# Patient Record
Sex: FEMALE | Race: BLACK OR AFRICAN AMERICAN | Employment: UNEMPLOYED | URBAN - METROPOLITAN AREA
[De-identification: names, ages, dates, MRNs, and addresses within clinical notes are randomized per-mention and may not be internally consistent; named-entity substitution may affect disease eponyms.]

---

## 2017-10-10 ENCOUNTER — HOSPITAL ENCOUNTER (EMERGENCY)
Age: 47
Discharge: HOME | End: 2017-10-10
Attending: EMERGENCY MEDICINE
Payer: MEDICARE

## 2017-10-10 ENCOUNTER — APPOINTMENT (OUTPATIENT)
Dept: GENERAL RADIOLOGY | Age: 47
End: 2017-10-10
Payer: MEDICARE

## 2017-10-10 VITALS
RESPIRATION RATE: 17 BRPM | OXYGEN SATURATION: 97 % | SYSTOLIC BLOOD PRESSURE: 163 MMHG | DIASTOLIC BLOOD PRESSURE: 84 MMHG | HEART RATE: 93 BPM | TEMPERATURE: 97.9 F

## 2017-10-10 DIAGNOSIS — R41.82 ALTERED MENTAL STATUS, UNSPECIFIED: Primary | ICD-10-CM

## 2017-10-10 LAB
ABSOLUTE EOS #: 0.5 K/UL (ref 0–0.4)
ABSOLUTE LYMPH #: 1.89 K/UL (ref 1–4.8)
ABSOLUTE MONO #: 0.38 K/UL (ref 0.1–0.8)
ACETAMINOPHEN LEVEL: <10 UG/ML (ref 10–30)
ANION GAP SERPL CALCULATED.3IONS-SCNC: 16 MMOL/L (ref 9–17)
BASOPHILS # BLD: 1 %
BASOPHILS ABSOLUTE: 0.06 K/UL (ref 0–0.2)
BUN BLDV-MCNC: 13 MG/DL (ref 6–20)
BUN/CREAT BLD: ABNORMAL (ref 9–20)
CALCIUM SERPL-MCNC: 8.8 MG/DL (ref 8.6–10.4)
CHLORIDE BLD-SCNC: 99 MMOL/L (ref 98–107)
CO2: 19 MMOL/L (ref 20–31)
CREAT SERPL-MCNC: 0.88 MG/DL (ref 0.5–0.9)
DIFFERENTIAL TYPE: ABNORMAL
EOSINOPHILS RELATIVE PERCENT: 8 %
ETHANOL PERCENT: 0.11 %
ETHANOL: 107 MG/DL
GFR AFRICAN AMERICAN: >60 ML/MIN
GFR NON-AFRICAN AMERICAN: >60 ML/MIN
GFR SERPL CREATININE-BSD FRML MDRD: ABNORMAL ML/MIN/{1.73_M2}
GFR SERPL CREATININE-BSD FRML MDRD: ABNORMAL ML/MIN/{1.73_M2}
GLUCOSE BLD-MCNC: 361 MG/DL (ref 70–99)
HCT VFR BLD CALC: 34.1 % (ref 36–46)
HEMOGLOBIN: 11.3 G/DL (ref 12–16)
LYMPHOCYTES # BLD: 30 %
MCH RBC QN AUTO: 30.8 PG (ref 26–34)
MCHC RBC AUTO-ENTMCNC: 33.3 G/DL (ref 31–37)
MCV RBC AUTO: 92.5 FL (ref 80–100)
MONOCYTES # BLD: 6 %
MORPHOLOGY: NORMAL
PDW BLD-RTO: 12.9 % (ref 12.5–15.4)
PLATELET # BLD: ABNORMAL K/UL (ref 140–450)
PLATELET ESTIMATE: ABNORMAL
PMV BLD AUTO: ABNORMAL FL (ref 6–12)
POTASSIUM SERPL-SCNC: 4.5 MMOL/L (ref 3.7–5.3)
RBC # BLD: 3.68 M/UL (ref 4–5.2)
RBC # BLD: ABNORMAL 10*6/UL
SALICYLATE LEVEL: <1 MG/DL (ref 3–10)
SEG NEUTROPHILS: 55 %
SEGMENTED NEUTROPHILS ABSOLUTE COUNT: 3.47 K/UL (ref 1.8–7.7)
SODIUM BLD-SCNC: 134 MMOL/L (ref 135–144)
TOXIC TRICYCLIC SC,BLOOD: NEGATIVE
WBC # BLD: 6.3 K/UL (ref 3.5–11)
WBC # BLD: ABNORMAL 10*3/UL

## 2017-10-10 PROCEDURE — 71020 XR CHEST STANDARD TWO VW: CPT

## 2017-10-10 PROCEDURE — 99285 EMERGENCY DEPT VISIT HI MDM: CPT

## 2017-10-10 PROCEDURE — 2500000003 HC RX 250 WO HCPCS: Performed by: STUDENT IN AN ORGANIZED HEALTH CARE EDUCATION/TRAINING PROGRAM

## 2017-10-10 PROCEDURE — 80048 BASIC METABOLIC PNL TOTAL CA: CPT

## 2017-10-10 PROCEDURE — 93005 ELECTROCARDIOGRAM TRACING: CPT

## 2017-10-10 PROCEDURE — 85025 COMPLETE CBC W/AUTO DIFF WBC: CPT

## 2017-10-10 PROCEDURE — 80307 DRUG TEST PRSMV CHEM ANLYZR: CPT

## 2017-10-10 PROCEDURE — 96374 THER/PROPH/DIAG INJ IV PUSH: CPT

## 2017-10-10 PROCEDURE — G0480 DRUG TEST DEF 1-7 CLASSES: HCPCS

## 2017-10-10 RX ORDER — LABETALOL HYDROCHLORIDE 5 MG/ML
5 INJECTION, SOLUTION INTRAVENOUS ONCE
Status: COMPLETED | OUTPATIENT
Start: 2017-10-10 | End: 2017-10-10

## 2017-10-10 RX ADMIN — LABETALOL HYDROCHLORIDE 5 MG: 5 INJECTION INTRAVENOUS at 20:15

## 2017-10-10 ASSESSMENT — ENCOUNTER SYMPTOMS
VOMITING: 0
SHORTNESS OF BREATH: 0
ABDOMINAL PAIN: 0
NAUSEA: 0

## 2017-10-11 LAB
EKG ATRIAL RATE: 89 BPM
EKG P AXIS: 30 DEGREES
EKG P-R INTERVAL: 150 MS
EKG Q-T INTERVAL: 352 MS
EKG QRS DURATION: 68 MS
EKG QTC CALCULATION (BAZETT): 428 MS
EKG R AXIS: 32 DEGREES
EKG T AXIS: 38 DEGREES
EKG VENTRICULAR RATE: 89 BPM

## 2017-10-11 NOTE — ED PROVIDER NOTES
physician    Rhythm: normal sinus  Rate: 89, normal  Axis: normal  Ectopy: none  Conduction: normal  ST Segments: normal, no acute infarction evident  T Waves: normal  Q Waves: none    EKG Interpretation:  Normal EKG    All EKG's are interpreted by the Emergency Department Physician who either signs or Co-signs this chart in the absence of a cardiologist.      CRITICAL CARE TIME:    None    Janna Mendoza MD, [facility]  Attending Emergency  Physician    (Please note that portions of this note were completed with a voice recognition program.  Efforts were made to edit the dictations but occasionally words are mis-transcribed.)       Janna Mendoza MD  10/10/17 2030

## 2017-10-11 NOTE — ED PROVIDER NOTES
[address]  ED  Emergency Department Encounter  Emergency Medicine Resident     Pt Name: Sneha Mckeon  MRN: [id number]  [place] 1970  Date of evaluation: 10/10/17  PCP:  No primary care provider on file. CHIEF COMPLAINT       Chief Complaint   Patient presents with    Altered Mental Status     Pt to ED per EMS from assisted, pt was arrested by [place] police and on the way to assisted pt became unresponsive       HISTORY OF PRESENT ILLNESS  (Location/Symptom, Timing/Onset, Context/Setting, Quality, Duration, Modifying Factors, Severity.)      Sneha Mckeon is a 40 y.o. female who presents with Episodes of syncope and altered mental status. Patient was being escorted to assisted when she became unresponsive. EMS reported that her breathing was unlabored, with a normal pulse oximetry. Patient arrived unresponsive, however after ammonia stick was placed on a patient so she came to. Patient is unaware of events leading up to her arrest were presentation to the emergency department. Patient states that all she remembers is regular house waiting for a gas company to come and next she remembers she is waking up in this department. Patient denies drug use. Patient has history of high blood pressure, states she has not taken her medications in over a day. Patient denies headaches, shortness of breath, chest pain, abdominal pain, nausea or vomiting. PAST MEDICAL / SURGICAL / SOCIAL / FAMILY HISTORY      has a past medical history of Diabetes mellitus ([address] Diabetic neuropathy ([address] and Hypertension. has no past surgical history on file. Social History     Social History    Marital status: Single     Spouse name: N/A    Number of children: N/A    Years of education: N/A     Occupational History    Not on file.      Social History Main Topics    Smoking status: Current Every Day Smoker     Packs/day: 1.00    Smokeless tobacco: Not on file    Alcohol use 3.6 oz/week     6 Cans of beer per week    Drug use: No    Sexual activity: Yes     Partners: Male     Other Topics Concern    Not on file     Social History Narrative    No narrative on file       No family history on file. Allergies:  Review of patient's allergies indicates no known allergies. Home Medications:  Prior to Admission medications    Not on File       REVIEW OF SYSTEMS    (2-9 systems for level 4, 10 or more for level 5)      Review of Systems   Constitutional: Negative for chills and fever. Respiratory: Negative for shortness of breath. Cardiovascular: Negative for chest pain. Gastrointestinal: Negative for abdominal pain, nausea and vomiting. Musculoskeletal: Negative. Neurological: Positive for syncope. Negative for facial asymmetry, speech difficulty, weakness and headaches. Psychiatric/Behavioral: Positive for confusion. PHYSICAL EXAM   (up to 7 for level 4, 8 or more for level 5)      INITIAL VITALS:   BP (!) 163/84   Pulse 93   Temp 97.9 °F (36.6 °C)   Resp 17   SpO2 97%     Physical Exam   Constitutional: No distress. HENT:   Head: Normocephalic and atraumatic. Eyes: EOM are normal. Pupils are equal, round, and reactive to light. Neck: Normal range of motion. No tracheal deviation present. Cardiovascular: Normal rate and regular rhythm. No murmur heard. Pulmonary/Chest: Effort normal. No respiratory distress. She has no wheezes. Abdominal: Soft. There is no tenderness. Neurological: She is alert. Skin: Skin is warm and dry.        DIFFERENTIAL  DIAGNOSIS     PLAN (LABS / IMAGING / EKG):  Orders Placed This Encounter   Procedures    XR CHEST STANDARD (2 VW)    URINALYSIS WITH MICROSCOPIC    TOX SCR, BLD, ED    Urine Drug Screen    CBC Auto Differential    BASIC METABOLIC PANEL    EKG 12 Lead       MEDICATIONS ORDERED:  Orders Placed This Encounter   Medications    labetalol (NORMODYNE;TRANDATE) injection 5 mg       DDX: Cardiogenic syncope, hypoglycemia, drug abuse,    DIAGNOSTIC RESULTS / EMERGENCY DEPARTMENT COURSE / MDM     LABS:  Results for orders placed or performed during the hospital encounter of 10/10/17   TOX SCR, BLD, ED   Result Value Ref Range    Ethanol 107 (H) <10 mg/dL    Ethanol percent 6.181 %    Salicylate Lvl <1 (L) 3 - 10 mg/dL    Acetaminophen Level <10 (L) 10 - 30 ug/mL    Toxic Tricyclic Sc,Blood NEGATIVE NEG   CBC Auto Differential   Result Value Ref Range    WBC 6.3 3.5 - 11.0 k/uL    RBC 3.68 (L) 4.0 - 5.2 m/uL    Hemoglobin 11.3 (L) 12.0 - 16.0 g/dL    Hematocrit 34.1 (L) 36 - 46 %    MCV 92.5 80 - 100 fL    MCH 30.8 26 - 34 pg    MCHC 33.3 31 - 37 g/dL    RDW 12.9 12.5 - 15.4 %    Platelets Platelet clumps present, count appears adequate. 140 - 450 k/uL    MPV NOT REPORTED 6.0 - 12.0 fL    Differential Type NOT REPORTED     WBC Morphology NOT REPORTED     RBC Morphology NOT REPORTED     Platelet Estimate NOT REPORTED     Seg Neutrophils 55 %    Lymphocytes 30 %    Monocytes 6 %    Eosinophils % 8 %    Basophils 1 %    Segs Absolute 3.47 1.8 - 7.7 k/uL    Absolute Lymph # 1.89 1.0 - 4.8 k/uL    Absolute Mono # 0.38 0.1 - 0.8 k/uL    Absolute Eos # 0.50 (H) 0.0 - 0.4 k/uL    Basophils # 0.06 0.0 - 0.2 k/uL    Morphology Normal    BASIC METABOLIC PANEL   Result Value Ref Range    Glucose 361 (H) 70 - 99 mg/dL    BUN 13 6 - 20 mg/dL    CREATININE 0.88 0.50 - 0.90 mg/dL    Bun/Cre Ratio NOT REPORTED 9 - 20    Calcium 8.8 8.6 - 10.4 mg/dL    Sodium 134 (L) 135 - 144 mmol/L    Potassium 4.5 3.7 - 5.3 mmol/L    Chloride 99 98 - 107 mmol/L    CO2 19 (L) 20 - 31 mmol/L    Anion Gap 16 9 - 17 mmol/L    GFR Non-African American >60 >60 mL/min    GFR African American >60 >60 mL/min    GFR Comment          GFR Staging NOT REPORTED        IMPRESSION: Patient is a 56-year-old female presenting with syncope and altered mental status. Patient was being transported to skilled nursing when she an episode of syncope.   Patient presents remain unlabored, with normal oxygen

## 2019-07-15 ENCOUNTER — HOSPITAL ENCOUNTER (EMERGENCY)
Age: 49
Discharge: ANOTHER HOSPITAL | End: 2019-07-15
Attending: EMERGENCY MEDICINE
Payer: MEDICARE

## 2019-07-15 ENCOUNTER — APPOINTMENT (OUTPATIENT)
Dept: CT IMAGING | Age: 49
End: 2019-07-15
Payer: MEDICARE

## 2019-07-15 ENCOUNTER — APPOINTMENT (OUTPATIENT)
Dept: GENERAL RADIOLOGY | Age: 49
End: 2019-07-15
Payer: MEDICARE

## 2019-07-15 VITALS
HEART RATE: 90 BPM | WEIGHT: 145 LBS | TEMPERATURE: 99.3 F | HEIGHT: 62 IN | RESPIRATION RATE: 20 BRPM | OXYGEN SATURATION: 100 % | DIASTOLIC BLOOD PRESSURE: 78 MMHG | BODY MASS INDEX: 26.68 KG/M2 | SYSTOLIC BLOOD PRESSURE: 140 MMHG

## 2019-07-15 DIAGNOSIS — E83.42 HYPOMAGNESEMIA: ICD-10-CM

## 2019-07-15 DIAGNOSIS — R55 SYNCOPE AND COLLAPSE: Primary | ICD-10-CM

## 2019-07-15 LAB
% CKMB: 5 % (ref 0–3)
-: ABNORMAL
ABSOLUTE EOS #: 0.2 K/UL (ref 0–0.44)
ABSOLUTE IMMATURE GRANULOCYTE: 0.03 K/UL (ref 0–0.3)
ABSOLUTE LYMPH #: 1.76 K/UL (ref 1.1–3.7)
ABSOLUTE MONO #: 0.44 K/UL (ref 0.1–1.2)
ACETAMINOPHEN LEVEL: <10 UG/ML (ref 10–30)
AMORPHOUS: ABNORMAL
AMPHETAMINE SCREEN URINE: NEGATIVE
ANION GAP SERPL CALCULATED.3IONS-SCNC: 12 MMOL/L (ref 9–17)
BACTERIA: ABNORMAL
BARBITURATE SCREEN URINE: POSITIVE
BASOPHILS # BLD: 1 % (ref 0–2)
BASOPHILS ABSOLUTE: 0.05 K/UL (ref 0–0.2)
BENZODIAZEPINE SCREEN, URINE: NEGATIVE
BILIRUBIN URINE: NEGATIVE
BUN BLDV-MCNC: 14 MG/DL (ref 6–20)
BUN/CREAT BLD: 10 (ref 9–20)
BUPRENORPHINE URINE: ABNORMAL
CALCIUM SERPL-MCNC: 8.8 MG/DL (ref 8.6–10.4)
CANNABINOID SCREEN URINE: NEGATIVE
CASTS UA: ABNORMAL /LPF
CHLORIDE BLD-SCNC: 103 MMOL/L (ref 98–107)
CK MB: 1.4 NG/ML
CKMB INTERPRETATION: ABNORMAL
CO2: 25 MMOL/L (ref 20–31)
COCAINE METABOLITE, URINE: NEGATIVE
COLOR: YELLOW
COMMENT UA: ABNORMAL
CREAT SERPL-MCNC: 1.39 MG/DL (ref 0.5–0.9)
CRYSTALS, UA: ABNORMAL /HPF
DIFFERENTIAL TYPE: ABNORMAL
EOSINOPHILS RELATIVE PERCENT: 3 % (ref 1–4)
EPITHELIAL CELLS UA: ABNORMAL /HPF (ref 0–5)
ETHANOL PERCENT: <0.01 %
ETHANOL: <10 MG/DL
GFR AFRICAN AMERICAN: 49 ML/MIN
GFR NON-AFRICAN AMERICAN: 41 ML/MIN
GFR SERPL CREATININE-BSD FRML MDRD: ABNORMAL ML/MIN/{1.73_M2}
GFR SERPL CREATININE-BSD FRML MDRD: ABNORMAL ML/MIN/{1.73_M2}
GLUCOSE BLD-MCNC: 83 MG/DL (ref 70–99)
GLUCOSE URINE: ABNORMAL
HCT VFR BLD CALC: 29.8 % (ref 36.3–47.1)
HEMOGLOBIN: 9.7 G/DL (ref 11.9–15.1)
IMMATURE GRANULOCYTES: 0 %
KETONES, URINE: NEGATIVE
LEUKOCYTE ESTERASE, URINE: NEGATIVE
LYMPHOCYTES # BLD: 25 % (ref 24–43)
MAGNESIUM: 1.1 MG/DL (ref 1.6–2.6)
MCH RBC QN AUTO: 30.2 PG (ref 25.2–33.5)
MCHC RBC AUTO-ENTMCNC: 32.6 G/DL (ref 28.4–34.8)
MCV RBC AUTO: 92.8 FL (ref 82.6–102.9)
MDMA URINE: ABNORMAL
METHADONE SCREEN, URINE: NEGATIVE
METHAMPHETAMINE, URINE: ABNORMAL
MONOCYTES # BLD: 6 % (ref 3–12)
MUCUS: ABNORMAL
MYOGLOBIN: 23 NG/ML (ref 25–58)
NITRITE, URINE: NEGATIVE
NRBC AUTOMATED: 0 PER 100 WBC
OPIATES, URINE: NEGATIVE
OTHER OBSERVATIONS UA: ABNORMAL
OXYCODONE SCREEN URINE: NEGATIVE
PDW BLD-RTO: 11.6 % (ref 11.8–14.4)
PH UA: 7.5 (ref 5–8)
PHENCYCLIDINE, URINE: NEGATIVE
PLATELET # BLD: 300 K/UL (ref 138–453)
PLATELET ESTIMATE: ABNORMAL
PMV BLD AUTO: 10.4 FL (ref 8.1–13.5)
POTASSIUM SERPL-SCNC: 3.3 MMOL/L (ref 3.7–5.3)
PROPOXYPHENE, URINE: ABNORMAL
PROTEIN UA: NEGATIVE
RBC # BLD: 3.21 M/UL (ref 3.95–5.11)
RBC # BLD: ABNORMAL 10*6/UL
RBC UA: ABNORMAL /HPF (ref 0–2)
RENAL EPITHELIAL, UA: ABNORMAL /HPF
SALICYLATE LEVEL: <1 MG/DL (ref 3–10)
SEG NEUTROPHILS: 65 % (ref 36–65)
SEGMENTED NEUTROPHILS ABSOLUTE COUNT: 4.65 K/UL (ref 1.5–8.1)
SODIUM BLD-SCNC: 140 MMOL/L (ref 135–144)
SPECIFIC GRAVITY UA: 1.01 (ref 1–1.03)
TEST INFORMATION: ABNORMAL
TOTAL CK: 28 U/L (ref 26–192)
TOXIC TRICYCLIC SC,BLOOD: NEGATIVE
TRICHOMONAS: ABNORMAL
TRICYCLIC ANTIDEPRESSANTS, UR: ABNORMAL
TROPONIN INTERP: ABNORMAL
TROPONIN INTERP: ABNORMAL
TROPONIN T: ABNORMAL NG/ML
TROPONIN T: ABNORMAL NG/ML
TROPONIN, HIGH SENSITIVITY: 19 NG/L (ref 0–14)
TROPONIN, HIGH SENSITIVITY: 21 NG/L (ref 0–14)
TURBIDITY: ABNORMAL
URINE HGB: NEGATIVE
UROBILINOGEN, URINE: NORMAL
WBC # BLD: 7.1 K/UL (ref 3.5–11.3)
WBC # BLD: ABNORMAL 10*3/UL
WBC UA: ABNORMAL /HPF (ref 0–5)
YEAST: ABNORMAL

## 2019-07-15 PROCEDURE — 70450 CT HEAD/BRAIN W/O DYE: CPT

## 2019-07-15 PROCEDURE — 80307 DRUG TEST PRSMV CHEM ANLYZR: CPT

## 2019-07-15 PROCEDURE — 72125 CT NECK SPINE W/O DYE: CPT

## 2019-07-15 PROCEDURE — 2580000003 HC RX 258: Performed by: NURSE PRACTITIONER

## 2019-07-15 PROCEDURE — 84484 ASSAY OF TROPONIN QUANT: CPT

## 2019-07-15 PROCEDURE — 82553 CREATINE MB FRACTION: CPT

## 2019-07-15 PROCEDURE — 83874 ASSAY OF MYOGLOBIN: CPT

## 2019-07-15 PROCEDURE — 6360000002 HC RX W HCPCS: Performed by: NURSE PRACTITIONER

## 2019-07-15 PROCEDURE — 81001 URINALYSIS AUTO W/SCOPE: CPT

## 2019-07-15 PROCEDURE — 96366 THER/PROPH/DIAG IV INF ADDON: CPT

## 2019-07-15 PROCEDURE — 80048 BASIC METABOLIC PNL TOTAL CA: CPT

## 2019-07-15 PROCEDURE — 93005 ELECTROCARDIOGRAM TRACING: CPT | Performed by: NURSE PRACTITIONER

## 2019-07-15 PROCEDURE — 99284 EMERGENCY DEPT VISIT MOD MDM: CPT

## 2019-07-15 PROCEDURE — 96365 THER/PROPH/DIAG IV INF INIT: CPT

## 2019-07-15 PROCEDURE — 71045 X-RAY EXAM CHEST 1 VIEW: CPT

## 2019-07-15 PROCEDURE — 82550 ASSAY OF CK (CPK): CPT

## 2019-07-15 PROCEDURE — G0480 DRUG TEST DEF 1-7 CLASSES: HCPCS

## 2019-07-15 PROCEDURE — 85025 COMPLETE CBC W/AUTO DIFF WBC: CPT

## 2019-07-15 PROCEDURE — 83735 ASSAY OF MAGNESIUM: CPT

## 2019-07-15 RX ORDER — AZELASTINE HYDROCHLORIDE 0.5 MG/ML
1 SOLUTION/ DROPS OPHTHALMIC 3 TIMES DAILY
COMMUNITY
End: 2021-12-01

## 2019-07-15 RX ORDER — INSULIN GLARGINE 100 [IU]/ML
10 INJECTION, SOLUTION SUBCUTANEOUS DAILY
COMMUNITY

## 2019-07-15 RX ORDER — ASPIRIN 81 MG/1
81 TABLET ORAL DAILY
COMMUNITY

## 2019-07-15 RX ORDER — 0.9 % SODIUM CHLORIDE 0.9 %
1000 INTRAVENOUS SOLUTION INTRAVENOUS ONCE
Status: COMPLETED | OUTPATIENT
Start: 2019-07-15 | End: 2019-07-15

## 2019-07-15 RX ORDER — GABAPENTIN 100 MG/1
100 CAPSULE ORAL 3 TIMES DAILY
COMMUNITY

## 2019-07-15 RX ORDER — METOCLOPRAMIDE 10 MG/1
10 TABLET ORAL 3 TIMES DAILY
COMMUNITY
End: 2021-12-01

## 2019-07-15 RX ORDER — ATORVASTATIN CALCIUM 10 MG/1
10 TABLET, FILM COATED ORAL NIGHTLY
COMMUNITY

## 2019-07-15 RX ORDER — AMLODIPINE BESYLATE 10 MG/1
10 TABLET ORAL DAILY
COMMUNITY
End: 2021-12-01 | Stop reason: ALTCHOICE

## 2019-07-15 RX ORDER — CARVEDILOL 6.25 MG/1
6.25 TABLET ORAL 2 TIMES DAILY
COMMUNITY
End: 2021-12-01

## 2019-07-15 RX ORDER — FLUTICASONE PROPIONATE 50 MCG
1 SPRAY, SUSPENSION (ML) NASAL DAILY
COMMUNITY
End: 2021-12-01

## 2019-07-15 RX ORDER — BUTALBITAL, ACETAMINOPHEN AND CAFFEINE 50; 325; 40 MG/1; MG/1; MG/1
1 TABLET ORAL EVERY 6 HOURS PRN
COMMUNITY
End: 2021-12-01

## 2019-07-15 RX ORDER — DULOXETIN HYDROCHLORIDE 30 MG/1
30 CAPSULE, DELAYED RELEASE ORAL DAILY
COMMUNITY
End: 2021-12-01

## 2019-07-15 RX ORDER — PANTOPRAZOLE SODIUM 40 MG/1
40 TABLET, DELAYED RELEASE ORAL
COMMUNITY

## 2019-07-15 RX ORDER — OXYBUTYNIN CHLORIDE 5 MG/1
5 TABLET ORAL NIGHTLY
COMMUNITY
End: 2021-12-01

## 2019-07-15 RX ORDER — LISINOPRIL 10 MG/1
10 TABLET ORAL NIGHTLY
COMMUNITY
End: 2021-12-01

## 2019-07-15 RX ORDER — MAGNESIUM SULFATE 1 G/100ML
1 INJECTION INTRAVENOUS ONCE
Status: DISCONTINUED | OUTPATIENT
Start: 2019-07-15 | End: 2019-07-15

## 2019-07-15 RX ORDER — MAGNESIUM SULFATE 1 G/100ML
1 INJECTION INTRAVENOUS
Status: COMPLETED | OUTPATIENT
Start: 2019-07-15 | End: 2019-07-15

## 2019-07-15 RX ORDER — OXYBUTYNIN CHLORIDE 5 MG/1
10 TABLET ORAL EVERY MORNING
COMMUNITY
End: 2021-12-01

## 2019-07-15 RX ORDER — CHLORTHALIDONE 50 MG/1
100 TABLET ORAL DAILY
COMMUNITY
End: 2021-12-01

## 2019-07-15 RX ADMIN — MAGNESIUM SULFATE HEPTAHYDRATE 1 G: 1 INJECTION, SOLUTION INTRAVENOUS at 13:55

## 2019-07-15 RX ADMIN — SODIUM CHLORIDE 1000 ML: 9 INJECTION, SOLUTION INTRAVENOUS at 15:21

## 2019-07-15 RX ADMIN — SODIUM CHLORIDE 1000 ML: 9 INJECTION, SOLUTION INTRAVENOUS at 13:56

## 2019-07-15 RX ADMIN — MAGNESIUM SULFATE HEPTAHYDRATE 1 G: 1 INJECTION, SOLUTION INTRAVENOUS at 15:17

## 2019-07-15 ASSESSMENT — ENCOUNTER SYMPTOMS
COUGH: 0
ABDOMINAL PAIN: 0
BACK PAIN: 0
VOMITING: 0
EYE PAIN: 0
DIARRHEA: 0
NAUSEA: 0
PHOTOPHOBIA: 0
SORE THROAT: 0
COLOR CHANGE: 0
SHORTNESS OF BREATH: 0

## 2019-07-15 NOTE — ED NOTES
Report called to [facility], Observation unit at [address] [place] [address], [facility]  07/15/19 1919

## 2019-07-15 NOTE — ED NOTES
Patient brought to ER by ambulance. Patient was shopping at the mall outside when she passed out. Patient states she was feeling a little dizzy at the time of incident. An ambulance was called, patient was treated, and was asking to be released when she passed out again. Patient states when she changes position her dizziness does occure, but not all the time and she thinks today's incident was from the heat. Patient lying in bed, no distress noted at this time, call light within reach, and instructed to call out for assistance as needed.       Noemy Mathur RN  07/15/19 0293

## 2019-07-15 NOTE — ED PROVIDER NOTES
[address] ED  eMERGENCY dEPARTMENT eNCOUnter      Pt Name: Hugo Garcia  MRN: [id number]  [place] 1970  Date of evaluation: 7/15/2019  Provider: ENEIDA Frank CNP    CHIEF COMPLAINT       Chief Complaint   Patient presents with    Loss of Consciousness         HISTORY OF PRESENT ILLNESS  (Location/Symptom, Timing/Onset, Context/Setting, Quality, Duration, Modifying Factors, Severity.)   Hugo Garcia is a 53 y.o. female who presents to the emergency department via EMS for syncope and dizziness. She had three episodes today while shopping. Reports another episode in her kitchen about a week ago. Denies fever, chills, headache, vision changes, N/T to her extremities, chest pain, SOB, N/V/D, urinary symptoms, abdominal pain. Denies injury from the episodes. Denies pain to her head, neck, back. Rates her pain 0/10. Nursing Notes were reviewed. ALLERGIES     Tape Evertt Bench tape]    CURRENT MEDICATIONS       Previous Medications    AMLODIPINE (NORVASC) 10 MG TABLET    Take 10 mg by mouth daily    ASPIRIN 81 MG EC TABLET    Take 81 mg by mouth daily    ATORVASTATIN (LIPITOR) 10 MG TABLET    Take 10 mg by mouth daily    AZELASTINE (OPTIVAR) 0.05 % OPHTHALMIC SOLUTION    Place 1 drop into both eyes 3 times daily    BUTALBITAL-ACETAMINOPHEN-CAFFEINE (FIORICET, ESGIC) -40 MG PER TABLET    Take 1 tablet by mouth every 6 hours as needed for Headaches    CARVEDILOL (COREG) 6.25 MG TABLET    Take 6.25 mg by mouth 2 times daily    CHLORTHALIDONE (HYGROTEN) 50 MG TABLET    Take 100 mg by mouth daily Indications: 50MG TABLETS, TAKE (2) TABLETSS EVERY MORNING    DULOXETINE (CYMBALTA) 30 MG EXTENDED RELEASE CAPSULE    Take 30 mg by mouth daily    FLUTICASONE (FLONASE) 50 MCG/ACT NASAL SPRAY    1 spray by Each Nostril route 2 times daily    GABAPENTIN (NEURONTIN) 300 MG CAPSULE    Take 200 mg by mouth 2 times daily.     INSULIN GLARGINE (LANTUS) 100 UNIT/ML INJECTION VIAL    Inject 10 Units into the skin nightly    INSULIN LISPRO (HUMALOG) 100 UNIT/ML INJECTION VIAL    Inject into the skin 4 times daily (with meals and nightly) Indications: sliding scale    LISINOPRIL (PRINIVIL;ZESTRIL) 10 MG TABLET    Take 10 mg by mouth nightly    METOCLOPRAMIDE (REGLAN) 10 MG TABLET    Take 10 mg by mouth three times daily    OXYBUTYNIN (DITROPAN) 5 MG TABLET    Take 10 mg by mouth every morning    OXYBUTYNIN (DITROPAN) 5 MG TABLET    Take 5 mg by mouth nightly    PANTOPRAZOLE SODIUM (PROTONIX) 40 MG PACK PACKET    Take 40 mg by mouth 2 times daily    SERTRALINE (ZOLOFT) 50 MG TABLET    Take 50 mg by mouth daily       PAST MEDICAL HISTORY         Diagnosis Date    Diabetes mellitus ([facility]     Diabetic neuropathy [facility]     Hypertension        SURGICAL HISTORY     History reviewed. No pertinent surgical history. FAMILY HISTORY     History reviewed. No pertinent family history. No family status information on file. SOCIAL HISTORY      reports that she has been smoking. She has been smoking about 1.00 pack per day. She has never used smokeless tobacco. She reports that she drinks about 6.0 standard drinks of alcohol per week. She reports that she does not use drugs. REVIEW OF SYSTEMS    (2-9 systems for level 4, 10 or more for level 5)     Review of Systems   Constitutional: Negative for chills, diaphoresis, fatigue and fever. HENT: Negative for congestion, nosebleeds and sore throat. Eyes: Negative for photophobia, pain and visual disturbance. Respiratory: Negative for cough and shortness of breath. Cardiovascular: Negative for chest pain, palpitations and leg swelling. Gastrointestinal: Negative for abdominal pain, diarrhea, nausea and vomiting. Musculoskeletal: Negative for arthralgias, back pain, myalgias and neck pain. Skin: Negative for color change, rash and wound. Neurological: Positive for dizziness, syncope and light-headedness.  Negative for facial asymmetry, speech difficulty, weakness, numbness and headaches. Psychiatric/Behavioral: Negative for confusion. Except as noted above the remainder of the review of systems was reviewed and negative. PHYSICAL EXAM    (up to 7 for level 4, 8 or more for level 5)     ED Triage Vitals   BP Temp Temp src Pulse Resp SpO2 Height Weight   -- -- -- -- -- -- -- --     Physical Exam   Constitutional: She is oriented to person, place, and time. She appears well-developed and well-nourished. No distress. HENT:   Head: Atraumatic. Right Ear: External ear normal.   Left Ear: External ear normal.   Mouth/Throat: Oropharynx is clear and moist.   Eyes: Pupils are equal, round, and reactive to light. Conjunctivae and EOM are normal.   Cardiovascular: Normal rate, regular rhythm and normal heart sounds. Pulmonary/Chest: Effort normal and breath sounds normal. No respiratory distress. She has no wheezes. She has no rales. Abdominal: Soft. Bowel sounds are normal. She exhibits no distension. There is no tenderness. Musculoskeletal: She exhibits no edema. Neurological: She is alert and oriented to person, place, and time. She has normal strength. No cranial nerve deficit. Coordination normal. GCS eye subscore is 4. GCS verbal subscore is 5. GCS motor subscore is 6. Skin: Skin is warm and dry. Capillary refill takes less than 2 seconds. No rash noted. She is not diaphoretic. Psychiatric: She has a normal mood and affect. Her behavior is normal.   Vitals reviewed. DIAGNOSTIC RESULTS     EKG: All EKG's are interpreted by the Emergency Department Physician who either signs or Co-signs this chart in the absence of a cardiologist.  EKG was interpreted by Dr. Antoni Patel after completion.       RADIOLOGY:   Non-plain film images such as CT, Ultrasound and MRI are read by the radiologist. Plain radiographic images are visualized and preliminarily interpreted by the emergency physician with the below findings:    Interpretation per the Radiologist below, if available at the time of this note:    Ct Head Wo Contrast    Result Date: 7/15/2019  EXAMINATION: CT OF THE CERVICAL SPINE WITHOUT CONTRAST; CT OF THE HEAD WITHOUT CONTRAST 7/15/2019 12:26 pm TECHNIQUE: CT of the cervical spine was performed without the administration of intravenous contrast. Multiplanar reformatted images are provided for review. Dose modulation, iterative reconstruction, and/or weight based adjustment of the mA/kV was utilized to reduce the radiation dose to as low as reasonably achievable.; CT of the head was performed without the administration of intravenous contrast. Dose modulation, iterative reconstruction, and/or weight based adjustment of the mA/kV was utilized to reduce the radiation dose to as low as reasonably achievable. COMPARISON: None. HISTORY: ORDERING SYSTEM PROVIDED HISTORY: fall; ORDERING SYSTEM PROVIDED HISTORY: dizziness, syncope TECHNOLOGIST PROVIDED HISTORY: FINDINGS: BRAIN: BRAIN/VENTRICLES: No acute intracranial hemorrhage. No mass effect. No midline shift. Ventricles and sulci are within normal limits. ORBITS: The visualized portion of the orbits demonstrate no acute abnormality. SINUSES: Mastoid air cells are clear. No significant paranasal sinus disease. SOFT TISSUES/SKULL:  No acute abnormality of the visualized skull or soft tissues. CERVICAL SPINE: BONES/ALIGNMENT: No acute fracture. No subluxation. DEGENERATIVE CHANGES: No significant degenerative changes. SOFT TISSUES: No prevertebral soft tissue swelling. No acute abnormality of the cervical spine. No acute intracranial abnormality. Ct Cervical Spine Wo Contrast    Result Date: 7/15/2019  EXAMINATION: CT OF THE CERVICAL SPINE WITHOUT CONTRAST; CT OF THE HEAD WITHOUT CONTRAST 7/15/2019 12:26 pm TECHNIQUE: CT of the cervical spine was performed without the administration of intravenous contrast. Multiplanar reformatted images are provided for review.  Dose modulation, iterative

## 2019-07-15 NOTE — ED PROVIDER NOTES
EMERGENCY DEPARTMENT ENCOUNTER   ATTENDING ATTESTATION     Pt Name: Romi Arroyo  MRN: [id number]  [place] 1970  Date of evaluation: 7/15/19   Romi Arroyo is a 53 y.o. female with CC: Loss of Consciousness    MDM:   The patient is a 81-year-old female who presented to the emergency department secondary to syncope. Differential diagnoses included syncope versus seizure. Apparently this happened 1 week ago as well. Patient's never been evaluated for seizures or syncopal episodes. CT head no acute findings, laboratory analysis revealed a hypomagnesia which was replaced. Discussed with patient admission initially patient declined however she agreed to be admitted but wanted to be transferred to [facility].  Given this transfer center contacted. Patient discussed with Dr. Nassar [facility] agreed to accept patient for transfer. Transportation documentation completed. Patient remained otherwise hemodynamic stable did not require any acute medical intervention. CRITICAL CARE:       EKG: All EKG's are interpreted by the Emergency Department Physician who either signs or Co-signs this chart in the absence of a cardiologist.      RADIOLOGY:All plain film, CT, MRI, and formal ultrasound images (except ED bedside ultrasound) are read by the radiologist, see reports below, unless otherwise noted in MDM or here. CT CERVICAL SPINE WO CONTRAST   Preliminary Result   No acute abnormality of the cervical spine. No acute intracranial abnormality. CT Head WO Contrast   Preliminary Result   No acute abnormality of the cervical spine. No acute intracranial abnormality. XR CHEST PORTABLE   Final Result   No acute cardiopulmonary pathology. LABS: All lab results were reviewed by myself, and all abnormals are listed below.   Labs Reviewed   BASIC METABOLIC PANEL - Abnormal; Notable for the following components:       Result Value    CREATININE 1.39 (*)     Potassium 3.3 (*)

## 2019-07-16 LAB
EKG ATRIAL RATE: 99 BPM
EKG P AXIS: 53 DEGREES
EKG P-R INTERVAL: 154 MS
EKG Q-T INTERVAL: 360 MS
EKG QRS DURATION: 80 MS
EKG QTC CALCULATION (BAZETT): 462 MS
EKG R AXIS: 49 DEGREES
EKG T AXIS: 47 DEGREES
EKG VENTRICULAR RATE: 99 BPM

## 2019-07-16 PROCEDURE — 93010 ELECTROCARDIOGRAM REPORT: CPT | Performed by: INTERNAL MEDICINE

## 2021-12-01 ENCOUNTER — HOSPITAL ENCOUNTER (INPATIENT)
Age: 51
LOS: 2 days | Discharge: HOME | DRG: 470 | End: 2021-12-03
Attending: EMERGENCY MEDICINE | Admitting: INTERNAL MEDICINE
Payer: MEDICARE

## 2021-12-01 ENCOUNTER — APPOINTMENT (OUTPATIENT)
Dept: GENERAL RADIOLOGY | Age: 51
DRG: 470 | End: 2021-12-01
Payer: MEDICARE

## 2021-12-01 ENCOUNTER — APPOINTMENT (OUTPATIENT)
Dept: CT IMAGING | Age: 51
DRG: 470 | End: 2021-12-01
Payer: MEDICARE

## 2021-12-01 DIAGNOSIS — I10 HYPERTENSION, UNSPECIFIED TYPE: Primary | ICD-10-CM

## 2021-12-01 PROBLEM — I16.9 HYPERTENSIVE CRISIS: Status: ACTIVE | Noted: 2021-12-01

## 2021-12-01 PROBLEM — K52.9 COLITIS: Status: ACTIVE | Noted: 2021-05-08

## 2021-12-01 PROBLEM — K21.9 GERD (GASTROESOPHAGEAL REFLUX DISEASE): Status: ACTIVE | Noted: 2017-09-07

## 2021-12-01 PROBLEM — I16.1 HYPERTENSIVE EMERGENCY: Status: ACTIVE | Noted: 2020-05-08

## 2021-12-01 PROBLEM — R10.13 EPIGASTRIC PAIN: Status: ACTIVE | Noted: 2019-05-13

## 2021-12-01 PROBLEM — E78.5 DYSLIPIDEMIA, GOAL LDL BELOW 70: Status: ACTIVE | Noted: 2017-09-07

## 2021-12-01 PROBLEM — Z99.2 ESRD (END STAGE RENAL DISEASE) ON DIALYSIS (HCC): Status: ACTIVE | Noted: 2021-12-01

## 2021-12-01 PROBLEM — N18.6 ESRD (END STAGE RENAL DISEASE) ON DIALYSIS (HCC): Status: ACTIVE | Noted: 2021-12-01

## 2021-12-01 LAB
ABSOLUTE EOS #: <0.03 K/UL (ref 0–0.44)
ABSOLUTE IMMATURE GRANULOCYTE: 0.06 K/UL (ref 0–0.3)
ABSOLUTE LYMPH #: 0.73 K/UL (ref 1.1–3.7)
ABSOLUTE MONO #: 0.46 K/UL (ref 0.1–1.2)
ALBUMIN SERPL-MCNC: 4.4 G/DL (ref 3.5–5.2)
ALBUMIN/GLOBULIN RATIO: ABNORMAL (ref 1–2.5)
ALLEN TEST: ABNORMAL
ALP BLD-CCNC: 234 U/L (ref 35–104)
ALT SERPL-CCNC: 14 U/L (ref 5–33)
ANION GAP SERPL CALCULATED.3IONS-SCNC: 23 MMOL/L (ref 9–17)
AST SERPL-CCNC: 21 U/L
BASOPHILS # BLD: 0 % (ref 0–2)
BASOPHILS ABSOLUTE: 0.05 K/UL (ref 0–0.2)
BETA-HYDROXYBUTYRATE: 2.44 MMOL/L (ref 0.02–0.27)
BILIRUB SERPL-MCNC: 0.71 MG/DL (ref 0.3–1.2)
BNP INTERPRETATION: ABNORMAL
BUN BLDV-MCNC: 39 MG/DL (ref 6–20)
BUN/CREAT BLD: 12 (ref 9–20)
CALCIUM SERPL-MCNC: 9.7 MG/DL (ref 8.6–10.4)
CHLORIDE BLD-SCNC: 94 MMOL/L (ref 98–107)
CO2: 19 MMOL/L (ref 20–31)
CREAT SERPL-MCNC: 3.21 MG/DL (ref 0.5–0.9)
DIFFERENTIAL TYPE: ABNORMAL
EOSINOPHILS RELATIVE PERCENT: 0 % (ref 1–4)
FIO2: ABNORMAL
GFR AFRICAN AMERICAN: 19 ML/MIN
GFR NON-AFRICAN AMERICAN: 15 ML/MIN
GFR SERPL CREATININE-BSD FRML MDRD: ABNORMAL ML/MIN/{1.73_M2}
GFR SERPL CREATININE-BSD FRML MDRD: ABNORMAL ML/MIN/{1.73_M2}
GLUCOSE BLD-MCNC: 312 MG/DL (ref 65–105)
GLUCOSE BLD-MCNC: 379 MG/DL (ref 65–105)
GLUCOSE BLD-MCNC: 394 MG/DL (ref 70–99)
HCO3 VENOUS: 21.2 MMOL/L (ref 22–29)
HCT VFR BLD CALC: 44.6 % (ref 36.3–47.1)
HEMOGLOBIN: 14.3 G/DL (ref 11.9–15.1)
IMMATURE GRANULOCYTES: 1 %
LACTIC ACID, SEPSIS WHOLE BLOOD: ABNORMAL MMOL/L (ref 0.5–1.9)
LACTIC ACID, SEPSIS WHOLE BLOOD: NORMAL MMOL/L (ref 0.5–1.9)
LACTIC ACID, SEPSIS: 1.5 MMOL/L (ref 0.5–1.9)
LACTIC ACID, SEPSIS: 2.3 MMOL/L (ref 0.5–1.9)
LYMPHOCYTES # BLD: 6 % (ref 24–43)
MCH RBC QN AUTO: 28.3 PG (ref 25.2–33.5)
MCHC RBC AUTO-ENTMCNC: 32.1 G/DL (ref 28.4–34.8)
MCV RBC AUTO: 88.1 FL (ref 82.6–102.9)
MODE: ABNORMAL
MONOCYTES # BLD: 4 % (ref 3–12)
MYOGLOBIN: 207 NG/ML (ref 25–58)
MYOGLOBIN: 226 NG/ML (ref 25–58)
MYOGLOBIN: 229 NG/ML (ref 25–58)
NEGATIVE BASE EXCESS, VEN: ABNORMAL (ref 0–2)
NRBC AUTOMATED: 0 PER 100 WBC
O2 DEVICE/FLOW/%: ABNORMAL
O2 SAT, VEN: 94 % (ref 60–85)
PATIENT TEMP: ABNORMAL
PCO2, VEN: 26.7 MM HG (ref 41–51)
PDW BLD-RTO: 14.1 % (ref 11.8–14.4)
PH VENOUS: 7.51 (ref 7.32–7.43)
PLATELET # BLD: 173 K/UL (ref 138–453)
PLATELET ESTIMATE: ABNORMAL
PMV BLD AUTO: 10.8 FL (ref 8.1–13.5)
PO2, VEN: 64.1 MM HG (ref 30–50)
POC PCO2 TEMP: ABNORMAL MM HG
POC PH TEMP: ABNORMAL
POC PO2 TEMP: ABNORMAL MM HG
POSITIVE BASE EXCESS, VEN: 0 (ref 0–3)
POTASSIUM SERPL-SCNC: 4.5 MMOL/L (ref 3.7–5.3)
PRO-BNP: ABNORMAL PG/ML
RBC # BLD: 5.06 M/UL (ref 3.95–5.11)
RBC # BLD: ABNORMAL 10*6/UL
SAMPLE SITE: ABNORMAL
SEG NEUTROPHILS: 89 % (ref 36–65)
SEGMENTED NEUTROPHILS ABSOLUTE COUNT: 10.03 K/UL (ref 1.5–8.1)
SODIUM BLD-SCNC: 136 MMOL/L (ref 135–144)
TOTAL CO2, VENOUS: ABNORMAL MMOL/L (ref 23–30)
TOTAL PROTEIN: 7.6 G/DL (ref 6.4–8.3)
TROPONIN INTERP: ABNORMAL
TROPONIN T: ABNORMAL NG/ML
TROPONIN, HIGH SENSITIVITY: 142 NG/L (ref 0–14)
WBC # BLD: 11.4 K/UL (ref 3.5–11.3)
WBC # BLD: ABNORMAL 10*3/UL

## 2021-12-01 PROCEDURE — 96374 THER/PROPH/DIAG INJ IV PUSH: CPT

## 2021-12-01 PROCEDURE — 71045 X-RAY EXAM CHEST 1 VIEW: CPT

## 2021-12-01 PROCEDURE — 82010 KETONE BODYS QUAN: CPT

## 2021-12-01 PROCEDURE — 6370000000 HC RX 637 (ALT 250 FOR IP): Performed by: NURSE PRACTITIONER

## 2021-12-01 PROCEDURE — 87040 BLOOD CULTURE FOR BACTERIA: CPT

## 2021-12-01 PROCEDURE — 82947 ASSAY GLUCOSE BLOOD QUANT: CPT

## 2021-12-01 PROCEDURE — 6360000002 HC RX W HCPCS: Performed by: NURSE PRACTITIONER

## 2021-12-01 PROCEDURE — 83880 ASSAY OF NATRIURETIC PEPTIDE: CPT

## 2021-12-01 PROCEDURE — 2500000003 HC RX 250 WO HCPCS: Performed by: PHYSICIAN ASSISTANT

## 2021-12-01 PROCEDURE — APPSS45 APP SPLIT SHARED TIME 31-45 MINUTES: Performed by: NURSE PRACTITIONER

## 2021-12-01 PROCEDURE — 99284 EMERGENCY DEPT VISIT MOD MDM: CPT

## 2021-12-01 PROCEDURE — 36415 COLL VENOUS BLD VENIPUNCTURE: CPT

## 2021-12-01 PROCEDURE — 84484 ASSAY OF TROPONIN QUANT: CPT

## 2021-12-01 PROCEDURE — 96375 TX/PRO/DX INJ NEW DRUG ADDON: CPT

## 2021-12-01 PROCEDURE — 82803 BLOOD GASES ANY COMBINATION: CPT

## 2021-12-01 PROCEDURE — 85025 COMPLETE CBC W/AUTO DIFF WBC: CPT

## 2021-12-01 PROCEDURE — 6370000000 HC RX 637 (ALT 250 FOR IP): Performed by: PHYSICIAN ASSISTANT

## 2021-12-01 PROCEDURE — 2060000000 HC ICU INTERMEDIATE R&B

## 2021-12-01 PROCEDURE — 96376 TX/PRO/DX INJ SAME DRUG ADON: CPT

## 2021-12-01 PROCEDURE — 83874 ASSAY OF MYOGLOBIN: CPT

## 2021-12-01 PROCEDURE — 83605 ASSAY OF LACTIC ACID: CPT

## 2021-12-01 PROCEDURE — 74176 CT ABD & PELVIS W/O CONTRAST: CPT

## 2021-12-01 PROCEDURE — 84145 PROCALCITONIN (PCT): CPT

## 2021-12-01 PROCEDURE — 93005 ELECTROCARDIOGRAM TRACING: CPT | Performed by: EMERGENCY MEDICINE

## 2021-12-01 PROCEDURE — 99222 1ST HOSP IP/OBS MODERATE 55: CPT | Performed by: NURSE PRACTITIONER

## 2021-12-01 PROCEDURE — 6360000002 HC RX W HCPCS: Performed by: PHYSICIAN ASSISTANT

## 2021-12-01 PROCEDURE — 6360000002 HC RX W HCPCS: Performed by: INTERNAL MEDICINE

## 2021-12-01 PROCEDURE — 80053 COMPREHEN METABOLIC PANEL: CPT

## 2021-12-01 RX ORDER — SODIUM CHLORIDE 9 MG/ML
25 INJECTION, SOLUTION INTRAVENOUS PRN
Status: DISCONTINUED | OUTPATIENT
Start: 2021-12-01 | End: 2021-12-03 | Stop reason: HOSPADM

## 2021-12-01 RX ORDER — LABETALOL HYDROCHLORIDE 5 MG/ML
20 INJECTION, SOLUTION INTRAVENOUS EVERY 10 MIN PRN
Status: DISCONTINUED | OUTPATIENT
Start: 2021-12-01 | End: 2021-12-03 | Stop reason: HOSPADM

## 2021-12-01 RX ORDER — FOLIC ACID/VIT B COMPLEX AND C 0.8 MG
1 TABLET ORAL EVERY MORNING
Status: DISCONTINUED | OUTPATIENT
Start: 2021-12-02 | End: 2021-12-03 | Stop reason: HOSPADM

## 2021-12-01 RX ORDER — CLONIDINE HYDROCHLORIDE 0.1 MG/1
0.1 TABLET ORAL EVERY 6 HOURS PRN
Status: DISCONTINUED | OUTPATIENT
Start: 2021-12-01 | End: 2021-12-03 | Stop reason: HOSPADM

## 2021-12-01 RX ORDER — FOLIC ACID/VIT B COMPLEX AND C 0.8 MG
1 TABLET ORAL EVERY MORNING
COMMUNITY

## 2021-12-01 RX ORDER — HYDRALAZINE HYDROCHLORIDE 20 MG/ML
10 INJECTION INTRAMUSCULAR; INTRAVENOUS ONCE
Status: COMPLETED | OUTPATIENT
Start: 2021-12-01 | End: 2021-12-01

## 2021-12-01 RX ORDER — ONDANSETRON 4 MG/1
4 TABLET, ORALLY DISINTEGRATING ORAL EVERY 8 HOURS PRN
Status: DISCONTINUED | OUTPATIENT
Start: 2021-12-01 | End: 2021-12-03 | Stop reason: HOSPADM

## 2021-12-01 RX ORDER — HYDRALAZINE HYDROCHLORIDE 100 MG/1
100 TABLET, FILM COATED ORAL DAILY
COMMUNITY

## 2021-12-01 RX ORDER — ONDANSETRON 2 MG/ML
4 INJECTION INTRAMUSCULAR; INTRAVENOUS ONCE
Status: COMPLETED | OUTPATIENT
Start: 2021-12-01 | End: 2021-12-01

## 2021-12-01 RX ORDER — MAGNESIUM OXIDE 400 MG/1
400 TABLET ORAL 3 TIMES DAILY
COMMUNITY

## 2021-12-01 RX ORDER — HEPARIN SODIUM 5000 [USP'U]/ML
5000 INJECTION, SOLUTION INTRAVENOUS; SUBCUTANEOUS EVERY 8 HOURS SCHEDULED
Status: DISCONTINUED | OUTPATIENT
Start: 2021-12-01 | End: 2021-12-03 | Stop reason: HOSPADM

## 2021-12-01 RX ORDER — ESCITALOPRAM OXALATE 20 MG/1
20 TABLET ORAL EVERY MORNING
COMMUNITY

## 2021-12-01 RX ORDER — ESCITALOPRAM OXALATE 10 MG/1
20 TABLET ORAL EVERY MORNING
Status: DISCONTINUED | OUTPATIENT
Start: 2021-12-02 | End: 2021-12-03 | Stop reason: HOSPADM

## 2021-12-01 RX ORDER — CETIRIZINE HYDROCHLORIDE 10 MG/1
10 TABLET ORAL DAILY
COMMUNITY

## 2021-12-01 RX ORDER — SENNA AND DOCUSATE SODIUM 50; 8.6 MG/1; MG/1
2 TABLET, FILM COATED ORAL DAILY PRN
Status: DISCONTINUED | OUTPATIENT
Start: 2021-12-01 | End: 2021-12-03 | Stop reason: HOSPADM

## 2021-12-01 RX ORDER — HYDRALAZINE HYDROCHLORIDE 25 MG/1
100 TABLET, FILM COATED ORAL EVERY 8 HOURS
Status: DISCONTINUED | OUTPATIENT
Start: 2021-12-01 | End: 2021-12-03

## 2021-12-01 RX ORDER — ERGOCALCIFEROL 1.25 MG/1
50000 CAPSULE ORAL WEEKLY
Status: DISCONTINUED | OUTPATIENT
Start: 2021-12-02 | End: 2021-12-03 | Stop reason: HOSPADM

## 2021-12-01 RX ORDER — OXYBUTYNIN CHLORIDE 10 MG/1
10 TABLET, EXTENDED RELEASE ORAL EVERY MORNING
COMMUNITY

## 2021-12-01 RX ORDER — OXYBUTYNIN CHLORIDE 10 MG/1
10 TABLET, EXTENDED RELEASE ORAL EVERY MORNING
Status: DISCONTINUED | OUTPATIENT
Start: 2021-12-02 | End: 2021-12-03 | Stop reason: HOSPADM

## 2021-12-01 RX ORDER — INSULIN GLARGINE 100 [IU]/ML
20 INJECTION, SOLUTION SUBCUTANEOUS DAILY
Status: DISCONTINUED | OUTPATIENT
Start: 2021-12-01 | End: 2021-12-03 | Stop reason: HOSPADM

## 2021-12-01 RX ORDER — GABAPENTIN 100 MG/1
200 CAPSULE ORAL 3 TIMES DAILY
Status: DISCONTINUED | OUTPATIENT
Start: 2021-12-01 | End: 2021-12-03 | Stop reason: HOSPADM

## 2021-12-01 RX ORDER — LORAZEPAM 2 MG/ML
0.5 INJECTION INTRAMUSCULAR ONCE
Status: COMPLETED | OUTPATIENT
Start: 2021-12-01 | End: 2021-12-01

## 2021-12-01 RX ORDER — HYDRALAZINE HYDROCHLORIDE 20 MG/ML
10 INJECTION INTRAMUSCULAR; INTRAVENOUS EVERY 6 HOURS PRN
Status: DISCONTINUED | OUTPATIENT
Start: 2021-12-01 | End: 2021-12-03 | Stop reason: HOSPADM

## 2021-12-01 RX ORDER — ACETAMINOPHEN 650 MG/1
650 SUPPOSITORY RECTAL EVERY 6 HOURS PRN
Status: DISCONTINUED | OUTPATIENT
Start: 2021-12-01 | End: 2021-12-03 | Stop reason: HOSPADM

## 2021-12-01 RX ORDER — CIPROFLOXACIN 2 MG/ML
400 INJECTION, SOLUTION INTRAVENOUS EVERY 12 HOURS
Status: DISCONTINUED | OUTPATIENT
Start: 2021-12-01 | End: 2021-12-01

## 2021-12-01 RX ORDER — ERGOCALCIFEROL 1.25 MG/1
50000 CAPSULE ORAL WEEKLY
COMMUNITY

## 2021-12-01 RX ORDER — SODIUM CHLORIDE 0.9 % (FLUSH) 0.9 %
5-40 SYRINGE (ML) INJECTION PRN
Status: DISCONTINUED | OUTPATIENT
Start: 2021-12-01 | End: 2021-12-03 | Stop reason: HOSPADM

## 2021-12-01 RX ORDER — CIPROFLOXACIN 2 MG/ML
400 INJECTION, SOLUTION INTRAVENOUS EVERY 24 HOURS
Status: DISCONTINUED | OUTPATIENT
Start: 2021-12-01 | End: 2021-12-03 | Stop reason: HOSPADM

## 2021-12-01 RX ORDER — ACETAMINOPHEN 325 MG/1
650 TABLET ORAL EVERY 6 HOURS PRN
Status: DISCONTINUED | OUTPATIENT
Start: 2021-12-01 | End: 2021-12-03 | Stop reason: HOSPADM

## 2021-12-01 RX ORDER — ATORVASTATIN CALCIUM 10 MG/1
10 TABLET, FILM COATED ORAL NIGHTLY
Status: DISCONTINUED | OUTPATIENT
Start: 2021-12-01 | End: 2021-12-03 | Stop reason: HOSPADM

## 2021-12-01 RX ORDER — ASPIRIN 81 MG/1
324 TABLET, CHEWABLE ORAL ONCE
Status: COMPLETED | OUTPATIENT
Start: 2021-12-01 | End: 2021-12-01

## 2021-12-01 RX ORDER — LANOLIN ALCOHOL/MO/W.PET/CERES
325 CREAM (GRAM) TOPICAL EVERY MORNING
COMMUNITY

## 2021-12-01 RX ORDER — LABETALOL 300 MG/1
600 TABLET, FILM COATED ORAL 3 TIMES DAILY
COMMUNITY
End: 2022-10-27 | Stop reason: ALTCHOICE

## 2021-12-01 RX ORDER — PANTOPRAZOLE SODIUM 40 MG/1
40 TABLET, DELAYED RELEASE ORAL
Status: DISCONTINUED | OUTPATIENT
Start: 2021-12-01 | End: 2021-12-03 | Stop reason: HOSPADM

## 2021-12-01 RX ORDER — MORPHINE SULFATE 4 MG/ML
4 INJECTION, SOLUTION INTRAMUSCULAR; INTRAVENOUS ONCE
Status: DISCONTINUED | OUTPATIENT
Start: 2021-12-01 | End: 2021-12-03 | Stop reason: HOSPADM

## 2021-12-01 RX ORDER — MORPHINE SULFATE 4 MG/ML
4 INJECTION, SOLUTION INTRAMUSCULAR; INTRAVENOUS ONCE
Status: COMPLETED | OUTPATIENT
Start: 2021-12-01 | End: 2021-12-01

## 2021-12-01 RX ORDER — BUMETANIDE 1 MG/1
4 TABLET ORAL
Status: DISCONTINUED | OUTPATIENT
Start: 2021-12-01 | End: 2021-12-03 | Stop reason: HOSPADM

## 2021-12-01 RX ORDER — SODIUM CHLORIDE 0.9 % (FLUSH) 0.9 %
5-40 SYRINGE (ML) INJECTION EVERY 12 HOURS SCHEDULED
Status: DISCONTINUED | OUTPATIENT
Start: 2021-12-01 | End: 2021-12-03 | Stop reason: HOSPADM

## 2021-12-01 RX ORDER — MORPHINE SULFATE 4 MG/ML
4 INJECTION, SOLUTION INTRAMUSCULAR; INTRAVENOUS ONCE
Status: DISCONTINUED | OUTPATIENT
Start: 2021-12-01 | End: 2021-12-01

## 2021-12-01 RX ORDER — LANOLIN ALCOHOL/MO/W.PET/CERES
325 CREAM (GRAM) TOPICAL EVERY MORNING
Status: DISCONTINUED | OUTPATIENT
Start: 2021-12-02 | End: 2021-12-03 | Stop reason: HOSPADM

## 2021-12-01 RX ORDER — METOPROLOL TARTRATE 5 MG/5ML
5 INJECTION INTRAVENOUS ONCE
Status: COMPLETED | OUTPATIENT
Start: 2021-12-01 | End: 2021-12-01

## 2021-12-01 RX ORDER — NIFEDIPINE 60 MG/1
60 TABLET, EXTENDED RELEASE ORAL DAILY
COMMUNITY

## 2021-12-01 RX ORDER — NIFEDIPINE 30 MG/1
60 TABLET, EXTENDED RELEASE ORAL 2 TIMES DAILY
Status: DISCONTINUED | OUTPATIENT
Start: 2021-12-01 | End: 2021-12-03

## 2021-12-01 RX ORDER — ONDANSETRON 4 MG/1
4 TABLET, ORALLY DISINTEGRATING ORAL EVERY 8 HOURS PRN
COMMUNITY

## 2021-12-01 RX ORDER — CLONIDINE HYDROCHLORIDE 0.1 MG/1
0.1 TABLET ORAL EVERY 6 HOURS PRN
COMMUNITY

## 2021-12-01 RX ORDER — ASPIRIN 81 MG/1
81 TABLET ORAL DAILY
Status: DISCONTINUED | OUTPATIENT
Start: 2021-12-02 | End: 2021-12-03 | Stop reason: HOSPADM

## 2021-12-01 RX ORDER — PROMETHAZINE HYDROCHLORIDE 25 MG/ML
25 INJECTION, SOLUTION INTRAMUSCULAR; INTRAVENOUS ONCE
Status: DISCONTINUED | OUTPATIENT
Start: 2021-12-01 | End: 2021-12-03 | Stop reason: HOSPADM

## 2021-12-01 RX ORDER — LABETALOL 200 MG/1
600 TABLET, FILM COATED ORAL 3 TIMES DAILY
Status: DISCONTINUED | OUTPATIENT
Start: 2021-12-01 | End: 2021-12-03

## 2021-12-01 RX ORDER — BUMETANIDE 2 MG/1
4 TABLET ORAL
COMMUNITY

## 2021-12-01 RX ORDER — SENNA AND DOCUSATE SODIUM 50; 8.6 MG/1; MG/1
2 TABLET, FILM COATED ORAL DAILY PRN
COMMUNITY

## 2021-12-01 RX ADMIN — ASPIRIN 81 MG CHEWABLE TABLET 324 MG: 81 TABLET CHEWABLE at 16:57

## 2021-12-01 RX ADMIN — METOPROLOL TARTRATE 5 MG: 5 INJECTION INTRAVENOUS at 14:39

## 2021-12-01 RX ADMIN — MORPHINE SULFATE 4 MG: 4 INJECTION INTRAVENOUS at 12:38

## 2021-12-01 RX ADMIN — ATORVASTATIN CALCIUM 10 MG: 10 TABLET, FILM COATED ORAL at 22:05

## 2021-12-01 RX ADMIN — LABETALOL HYDROCHLORIDE 600 MG: 200 TABLET, FILM COATED ORAL at 22:06

## 2021-12-01 RX ADMIN — HYDRALAZINE HYDROCHLORIDE 100 MG: 25 TABLET, FILM COATED ORAL at 23:39

## 2021-12-01 RX ADMIN — LABETALOL HYDROCHLORIDE 600 MG: 200 TABLET, FILM COATED ORAL at 17:40

## 2021-12-01 RX ADMIN — NIFEDIPINE 60 MG: 30 TABLET, FILM COATED, EXTENDED RELEASE ORAL at 17:40

## 2021-12-01 RX ADMIN — LORAZEPAM 0.5 MG: 2 INJECTION INTRAMUSCULAR; INTRAVENOUS at 14:39

## 2021-12-01 RX ADMIN — MAGNESIUM OXIDE TAB 400 MG (241.3 MG ELEMENTAL MG) 400 MG: 400 (241.3 MG) TAB at 22:07

## 2021-12-01 RX ADMIN — HEPARIN SODIUM 5000 UNITS: 5000 INJECTION INTRAVENOUS; SUBCUTANEOUS at 23:12

## 2021-12-01 RX ADMIN — CIPROFLOXACIN 400 MG: 2 INJECTION, SOLUTION INTRAVENOUS at 23:12

## 2021-12-01 RX ADMIN — HYDRALAZINE HYDROCHLORIDE 10 MG: 20 INJECTION INTRAMUSCULAR; INTRAVENOUS at 13:43

## 2021-12-01 RX ADMIN — MAGNESIUM OXIDE TAB 400 MG (241.3 MG ELEMENTAL MG) 400 MG: 400 (241.3 MG) TAB at 17:40

## 2021-12-01 RX ADMIN — HYDRALAZINE HYDROCHLORIDE 10 MG: 20 INJECTION INTRAMUSCULAR; INTRAVENOUS at 12:38

## 2021-12-01 RX ADMIN — ONDANSETRON 4 MG: 2 INJECTION INTRAMUSCULAR; INTRAVENOUS at 12:38

## 2021-12-01 RX ADMIN — INSULIN GLARGINE 20 UNITS: 100 INJECTION, SOLUTION SUBCUTANEOUS at 17:42

## 2021-12-01 RX ADMIN — GABAPENTIN 200 MG: 100 CAPSULE ORAL at 23:12

## 2021-12-01 ASSESSMENT — PAIN SCALES - GENERAL
PAINLEVEL_OUTOF10: 10
PAINLEVEL_OUTOF10: 4
PAINLEVEL_OUTOF10: 0

## 2021-12-01 NOTE — ED PROVIDER NOTES
eMERGENCY dEPARTMENT eNCOUnter   Independent Attestation     Pt Name: Rosio Lucas  MRN: [id number]  [place] 1970  Date of evaluation: 12/1/21     Rosio Lucas is a 49 y.o. female with CC: Hypertension      Based on the medical record the care appears appropriate. I was personally available for consultation in the Emergency Department. The care is provided during an unprecedented national emergency due to the novel coronavirus, COVID 19.     Cyndi Kessler DO  Attending Emergency Physician                    Laya Cervantes DO  12/01/21 1600

## 2021-12-01 NOTE — PROGRESS NOTES
Transitions of Care Pharmacy Service   Medication Review    The patient's list of current home medications has been reviewed and is in progress. Current med list was confirmed by [facility] (she gets bubblepacks). She confirmed she only takes what is in her bubblepacks, plus her insulin. I am waiting for a med list from her dialysis center to confirm any regular meds given while at dialysis. Source(s) of information: patient, [facility], Care Everywhere      Please feel free to call me with any questions about this encounter. Thank you.     Raúl Chamorro [facility]   Transitions of Care Pharmacy Service  Phone:  [phone]  Fax: [phone]      Electronically signed by Raúl Chamorro [facility] on 12/1/2021 at 5:45 PM       Prior to Admission medications    Medication Sig       vitamin D (ERGOCALCIFEROL) 1.25 MG (32397 UT) CAPS capsule Take 50,000 Units by mouth once a week    Gets in bubblepack; unsure of day of week   B Complex-C-Folic Acid (BRITNI-MARGARITO) TABS Take 1 tablet by mouth every morning       labetalol (NORMODYNE) 300 MG tablet Take 600 mg by mouth 3 times daily Takes 2 tabs (=600mg) TID       cloNIDine (CATAPRES) 0.1 MG tablet Take 0.1 mg by mouth every 6 hours as needed for High Blood Pressure (SBP>165)       hydrALAZINE (APRESOLINE) 100 MG tablet Take 100 mg by mouth every 8 hours       NIFEdipine (PROCARDIA XL) 60 MG extended release tablet Take 60 mg by mouth 2 times daily       bumetanide (BUMEX) 2 MG tablet Take 4 mg by mouth 2 times daily (before meals) Takes 2 tabs (=4mg) BID before meals       ondansetron (ZOFRAN-ODT) 4 MG disintegrating tablet Take 4 mg by mouth every 8 hours as needed for Nausea or Vomiting       oxybutynin (DITROPAN-XL) 10 MG extended release tablet Take 10 mg by mouth every morning       cetirizine (ZYRTEC) 10 MG tablet Take 10 mg by mouth daily       escitalopram (LEXAPRO) 20 MG tablet Take 20 mg by mouth every morning       ferrous

## 2021-12-01 NOTE — ED NOTES
Bed: 17  Expected date: 12/1/21  Expected time: 12:00 PM  [facility]  Comments:  [facility]     Brandon Echeverria [facility]  12/01/21 8029

## 2021-12-01 NOTE — H&P
[facility]  Office: [address], DO, Funmilayo Din, DO, Real Acron, DO, Nati Monae Blood, DO, Ruperto Alex MD, Raymond Recoi MD, Felicitas Borja MD, Scarlett Dye MD, Lizy Barlow MD, Dariel Ashley MD, Ivonne Gillespie MD, Surendra Lockwood, DO, Phillip Cat, DO, Adrienne Castañeda MD,  Micha Fischer, DO, Mayco Rubalcava MD, Taylor Elliott MD, Alondra Mitchell MD, Jose Serrato MD, Brett Alicea MD, Mercedez Miner MD, Jason Nair MD, Liz Kumar, [facility], [facility], CNP, Caitlyn Terrell, CNP, Mamta Willams, CNS, Anurag Ayala, CNP, Adelia Alpers, CNP, Collins Jaramillo, CNP, Lupe Ferguson, CNP, Indu Holcomb, CNP, Alexys Dee PA-C, Nhi Bonilla, [facility], José Heaton, CNP, Nilesh Fajardo, CNP, Zulema Mullen, CNP, Ladarius Zhu, CNP, Adolfo Pérez, CNP, Marsha Girard, CNP, Yany Yuen, [address]    HISTORY AND PHYSICAL EXAMINATION            Date:   12/1/2021  Patient name:  João Leonard  Date of admission:  12/1/2021 12:07 PM  MRN:   [id number]  Account:  [de-identified]  YOB: 1970  PCP:    ENEIDA Sapp CNP  Room:   [place] [address]  Code Status:    Full Code    Chief Complaint:     Chief Complaint   Patient presents with    Hypertension       History Obtained From:     patient    History of Present Illness:     João Leonard is a 49 y.o. Non- / non  female who presents with Hypertension   and is admitted to the hospital for the management of Hypertensive crisis. Patient reports for the past 3 days she has had abdominal discomfort with nausea and diarrhea. Patient reports the symptoms came on suddenly and she denies fever chills. Patient reports that over the past 24 hours her symptoms have worsened and she reported to her dialysis center today for her scheduled dialysis.  Patient did not complete dialysis as she was found to be hypertensive with a headache and was therefore considered to be in hypertensive crisis. Patient was transported to the emergency department where a full evaluation was completed and CT imaging is consistent with colitis of unknown etiology. Patient's white count is minimally elevated and she has no fevers. Patient's hypertension was treated and she reports that her headache is improved however she is very tired and weak. At the time my exam patient is resting but easily aroused. Patient answers all questions appropriately. Physical exam is essentially unremarkable with the exception of tenderness to the epigastric region. Case was discussed in person with nephrology and they expressed their intention to dialyze tomorrow as her electrolytes are not significantly abnormal at this time. Past Medical History:     Past Medical History:   Diagnosis Date    Diabetes mellitus ([facility]     Diabetic neuropathy ([facility]     Hypertension         Past Surgical History:     No past surgical history on file. Medications Prior to Admission:     Prior to Admission medications    Medication Sig Start Date End Date Taking?  Authorizing Provider   vitamin D (ERGOCALCIFEROL) 1.25 MG (28735 UT) CAPS capsule Take 50,000 Units by mouth once a week   Yes Historical Provider, MD   B Complex-C-Folic Acid (BRITNI-MARGARITO) TABS Take 1 tablet by mouth every morning   Yes Historical Provider, MD   labetalol (NORMODYNE) 300 MG tablet Take 600 mg by mouth 3 times daily Takes 2 tabs (=600mg) TID   Yes Historical Provider, MD   cloNIDine (CATAPRES) 0.1 MG tablet Take 0.1 mg by mouth every 6 hours as needed for High Blood Pressure (SBP>165)   Yes Historical Provider, MD   hydrALAZINE (APRESOLINE) 100 MG tablet Take 100 mg by mouth every 8 hours   Yes Historical Provider, MD   NIFEdipine (PROCARDIA XL) 60 MG extended release tablet Take 60 mg by mouth 2 times daily   Yes Historical Provider, MD   bumetanide (BUMEX) 2 MG tablet Take 4 mg by mouth 2 times daily (before meals) Takes 2 tabs (=4mg) BID before meals   Yes Historical Provider, MD   ondansetron (ZOFRAN-ODT) 4 MG disintegrating tablet Take 4 mg by mouth every 8 hours as needed for Nausea or Vomiting   Yes Historical Provider, MD   oxybutynin (DITROPAN-XL) 10 MG extended release tablet Take 10 mg by mouth every morning   Yes Historical Provider, MD   cetirizine (ZYRTEC) 10 MG tablet Take 10 mg by mouth daily   Yes Historical Provider, MD   escitalopram (LEXAPRO) 20 MG tablet Take 20 mg by mouth every morning   Yes Historical Provider, MD   ferrous sulfate (FE TABS 325) 325 (65 Fe) MG EC tablet Take 325 mg by mouth every morning   Yes Historical Provider, MD   magnesium oxide (MAG-OX) 400 MG tablet Take 400 mg by mouth 3 times daily   Yes Historical Provider, MD   sennosides-docusate sodium (SENOKOT-S) 8.6-50 MG tablet Take 2 tablets by mouth daily as needed for Constipation   Yes Historical Provider, MD   atorvastatin (LIPITOR) 10 MG tablet Take 10 mg by mouth nightly    Yes Historical Provider, MD   aspirin 81 MG EC tablet Take 81 mg by mouth daily   Yes Historical Provider, MD   gabapentin (NEURONTIN) 100 MG capsule Take 200 mg by mouth 3 times daily. Takes 2 caps (=200mg) TID   Yes Historical Provider, MD   pantoprazole (PROTONIX) 40 MG tablet Take 40 mg by mouth 2 times daily (before meals)    Yes Historical Provider, MD   insulin lispro (HUMALOG) 100 UNIT/ML injection vial Inject into the skin 4 times daily (with meals and nightly) Indications: sliding scale   Yes Historical Provider, MD   insulin glargine (LANTUS) 100 UNIT/ML injection vial Inject 20 Units into the skin daily    Yes Historical Provider, MD        Allergies:     Tape Ellender Jetty tape]    Social History:     Tobacco:    reports that she has been smoking. She has been smoking about 1.00 pack per day. She has never used smokeless tobacco.  Alcohol:      reports current alcohol use of about 6.0 standard drinks of alcohol per week. Drug Use:  reports no history of drug use.     Family History:     Family History   Problem Relation Age of Onset    No Known Problems Mother     No Known Problems Father    Pt is a poor historian     Review of Systems:     Positive and Negative as described in HPI.     CONSTITUTIONAL:  negative for fevers, chills, sweats, fatigue, weight loss  HEENT:  negative for vision, hearing changes, runny nose, throat pain  RESPIRATORY:  negative for shortness of breath, cough, congestion, wheezing  CARDIOVASCULAR:  negative for chest pain, palpitations  GASTROINTESTINAL:  negative for nausea, vomiting, diarrhea, constipation, change in bowel habits, abdominal pain   GENITOURINARY:  negative for difficulty of urination, burning with urination, frequency   INTEGUMENT:  negative for rash, skin lesions, easy bruising   HEMATOLOGIC/LYMPHATIC:  negative for swelling/edema   ALLERGIC/IMMUNOLOGIC:  negative for urticaria , itching  ENDOCRINE:  negative increase in drinking, increase in urination, hot or cold intolerance  MUSCULOSKELETAL:  negative joint pains, muscle aches, swelling of joints  NEUROLOGICAL:  negative for headaches, dizziness, lightheadedness, numbness, pain, tingling extremities  BEHAVIOR/PSYCH:  negative for depression, anxiety    Physical Exam:   BP (!) 190/86   Pulse 103   Temp 98.8 °F (37.1 °C) (Oral)   Resp 22   Ht 5' 3\" (1.6 m)   Wt 120 lb (54.4 kg)   LMP  (LMP Unknown)   SpO2 94%   BMI 21.26 kg/m²   Temp (24hrs), Av.8 °F (37.1 °C), Min:98.8 °F (37.1 °C), Max:98.8 °F (37.1 °C)    Recent Labs     21  1235 21  1736   POCGLU 379* 312*     No intake or output data in the 24 hours ending 21 1909    General Appearance:  alert, well appearing, and in no acute distress  Mental status: oriented to person, place, and time  Head:  normocephalic, atraumatic  Eye: no icterus, redness, pupils equal and reactive, extraocular eye movements intact, conjunctiva clear  Ear: normal external ear, no discharge, hearing intact  Nose:  no drainage noted  Mouth: mucous membranes moist  Neck: supple, no carotid bruits, thyroid not palpable  Lungs: Bilateral equal air entry, clear to auscultation, no wheezing, rales or rhonchi, normal effort  Cardiovascular: normal rate, regular rhythm, no murmur, gallop, rub.   Abdomen: Soft, nontender, nondistended, normal bowel sounds, no hepatomegaly or splenomegaly  Neurologic: There are no new focal motor or sensory deficits, normal muscle tone and bulk, no abnormal sensation, normal speech, cranial nerves II through XII grossly intact  Skin: No gross lesions, rashes, bruising or bleeding on exposed skin area  Extremities:  peripheral pulses palpable, no pedal edema or calf pain with palpation  Psych: normal affect     Investigations:      Laboratory Testing:  Recent Results (from the past 24 hour(s))   CBC Auto Differential    Collection Time: 12/01/21 12:34 PM   Result Value Ref Range    WBC 11.4 (H) 3.5 - 11.3 k/uL    RBC 5.06 3.95 - 5.11 m/uL    Hemoglobin 14.3 11.9 - 15.1 g/dL    Hematocrit 44.6 36.3 - 47.1 %    MCV 88.1 82.6 - 102.9 fL    MCH 28.3 25.2 - 33.5 pg    MCHC 32.1 28.4 - 34.8 g/dL    RDW 14.1 11.8 - 14.4 %    Platelets 733 990 - 198 k/uL    MPV 10.8 8.1 - 13.5 fL    NRBC Automated 0.0 0.0 per 100 WBC    Differential Type NOT REPORTED     Seg Neutrophils 89 (H) 36 - 65 %    Lymphocytes 6 (L) 24 - 43 %    Monocytes 4 3 - 12 %    Eosinophils % 0 (L) 1 - 4 %    Basophils 0 0 - 2 %    Immature Granulocytes 1 (H) 0 %    Segs Absolute 10.03 (H) 1.50 - 8.10 k/uL    Absolute Lymph # 0.73 (L) 1.10 - 3.70 k/uL    Absolute Mono # 0.46 0.10 - 1.20 k/uL    Absolute Eos # <0.03 0.00 - 0.44 k/uL    Basophils Absolute 0.05 0.00 - 0.20 k/uL    Absolute Immature Granulocyte 0.06 0.00 - 0.30 k/uL    WBC Morphology NOT REPORTED     RBC Morphology NOT REPORTED     Platelet Estimate NOT REPORTED    Comprehensive Metabolic Panel    Collection Time: 12/01/21 12:34 PM   Result Value Ref Range    Glucose 394 (H) 70 - 99 mg/dL    BUN 39 (H) 6 - 20 mg/dL CREATININE 3.21 (H) 0.50 - 0.90 mg/dL    Bun/Cre Ratio 12 9 - 20    Calcium 9.7 8.6 - 10.4 mg/dL    Sodium 136 135 - 144 mmol/L    Potassium 4.5 3.7 - 5.3 mmol/L    Chloride 94 (L) 98 - 107 mmol/L    CO2 19 (L) 20 - 31 mmol/L    Anion Gap 23 (H) 9 - 17 mmol/L    Alkaline Phosphatase 234 (H) 35 - 104 U/L    ALT 14 5 - 33 U/L    AST 21 <32 U/L    Total Bilirubin 0.71 0.3 - 1.2 mg/dL    Total Protein 7.6 6.4 - 8.3 g/dL    Albumin 4.4 3.5 - 5.2 g/dL    Albumin/Globulin Ratio NOT REPORTED 1.0 - 2.5    GFR Non-African American 15 (L) >60 mL/min    GFR  19 (L) >60 mL/min    GFR Comment          GFR Staging NOT REPORTED    TROP/MYOGLOBIN    Collection Time: 12/01/21 12:34 PM   Result Value Ref Range    Troponin, High Sensitivity 142 (HH) 0 - 14 ng/L    Troponin T NOT REPORTED <0.03 ng/mL    Troponin Interp NOT REPORTED     Myoglobin 229 (H) 25 - 58 ng/mL   Beta-Hydroxybutyrate    Collection Time: 12/01/21 12:34 PM   Result Value Ref Range    Beta-Hydroxybutyrate 2.44 (H) 0.02 - 0.27 mmol/L   Brain Natriuretic Peptide    Collection Time: 12/01/21 12:34 PM   Result Value Ref Range    Pro-BNP 17,486 (H) <300 pg/mL    BNP Interpretation NOT REPORTED    POC Glucose Fingerstick    Collection Time: 12/01/21 12:35 PM   Result Value Ref Range    POC Glucose 379 (H) 65 - 105 mg/dL   EKG 12 Lead    Collection Time: 12/01/21 12:43 PM   Result Value Ref Range    Ventricular Rate 103 BPM    Atrial Rate 103 BPM    P-R Interval 134 ms    QRS Duration 72 ms    Q-T Interval 370 ms    QTc Calculation (Bazett) 484 ms    P Axis 51 degrees    R Axis 23 degrees    T Axis 53 degrees   Venous Blood Gas, POC    Collection Time: 12/01/21  2:10 PM   Result Value Ref Range    pH, Arnav 7.509 (H) 7.320 - 7.430    pCO2, Arnav 26.7 (L) 41.0 - 51.0 mm Hg    pO2, Arnav 64.1 (H) 30.0 - 50.0 mm Hg    HCO3, Venous 21.2 (L) 22.0 - 29.0 mmol/L    Total CO2, Venous NOT REPORTED 23.0 - 30.0 mmol/L    Negative Base Excess, Arnav NOT REPORTED 0.0 - 2.0 Positive Base Excess, Arnav 0 0.0 - 3.0    O2 Sat, Arnav 94 (H) 60.0 - 85.0 %    O2 Device/Flow/% NOT REPORTED     Manuel Test NOT REPORTED     Sample Site NOT REPORTED     Mode NOT REPORTED     FIO2 NOT REPORTED     Pt Temp NOT REPORTED     POC pH Temp NOT REPORTED     POC pCO2 Temp NOT REPORTED mm Hg    POC pO2 Temp NOT REPORTED mm Hg   TROP/MYOGLOBIN    Collection Time: 12/01/21  2:32 PM   Result Value Ref Range    Troponin, High Sensitivity 142 (HH) 0 - 14 ng/L    Troponin T NOT REPORTED <0.03 ng/mL    Troponin Interp NOT REPORTED     Myoglobin 226 (H) 25 - 58 ng/mL   TROP/MYOGLOBIN    Collection Time: 12/01/21  4:23 PM   Result Value Ref Range    Troponin, High Sensitivity 142 (HH) 0 - 14 ng/L    Troponin T NOT REPORTED <0.03 ng/mL    Troponin Interp NOT REPORTED     Myoglobin 207 (H) 25 - 58 ng/mL   POC Glucose Fingerstick    Collection Time: 12/01/21  5:36 PM   Result Value Ref Range    POC Glucose 312 (H) 65 - 105 mg/dL       Imaging/Diagnostics:  CT ABDOMEN PELVIS WO CONTRAST Additional Contrast? None    Result Date: 12/1/2021  1. Nonspecific generalized peritoneal fat infiltration with small volume pelvic free fluid and some traces of fluid along the flanks right greater than left. Possibilities include peritonitis, colitis or other intra-infectious process. Apparent thickening of the descending colon probably due to underdistention and lack of contrast. 2. Punctate calcifications around the renal hilum could be vascular or intrarenal. 3. Cholelithiasis. 4. No evidence for bowel obstruction. XR CHEST PORTABLE    Result Date: 12/1/2021  Interval placement of double lumen right IJ central line terminating in right atrium. No acute process.        Assessment :      Hospital Problems           Last Modified POA    * (Principal) Hypertensive crisis 12/1/2021 Yes    Colitis 12/1/2021 Yes    Epigastric pain 12/1/2021 Yes    Overview Signed 12/1/2021  7:05 PM by ENEIDA Monet NP     Formatting of this

## 2021-12-01 NOTE — ED PROVIDER NOTES
[address] [place] [address] ED  [facility]      Pt Name: Charan Tran  MRN: [id number]  [place] 1970  Date ofevaluation: 12/1/2021  Provider: Nallely Vidal PA-C    CHIEF COMPLAINT       Chief Complaint   Patient presents with    Hypertension         HISTORY OF PRESENT ILLNESS  (Location/Symptom, Timing/Onset, Context/Setting, Quality, Duration, Modifying Factors, Severity.)   Charan Tarn is a 49 y.o. female who presents to the emergency department with high blood pressure, nausea, vomiting over the last 3 days. Patient attends dialysis Monday, Wednesday, Friday. Her last dialysis was Monday. She was too hypertensive today so it was not done. Patient was sent to our emergency department from dialysis today. Nursing Notes were reviewed. ALLERGIES     Tape Guevara Mutton tape]    CURRENT MEDICATIONS       Previous Medications    ASPIRIN 81 MG EC TABLET    Take 81 mg by mouth daily    ATORVASTATIN (LIPITOR) 10 MG TABLET    Take 10 mg by mouth nightly     B COMPLEX-C-FOLIC ACID (BRITNI-MARGARITO) TABS    Take 1 tablet by mouth every morning    BUMETANIDE (BUMEX) 2 MG TABLET    Take 4 mg by mouth 2 times daily (before meals) Takes 2 tabs (=4mg) BID before meals    CETIRIZINE (ZYRTEC) 10 MG TABLET    Take 10 mg by mouth daily    CLONIDINE (CATAPRES) 0.1 MG TABLET    Take 0.1 mg by mouth every 6 hours as needed for High Blood Pressure (SBP>165)    ESCITALOPRAM (LEXAPRO) 20 MG TABLET    Take 20 mg by mouth every morning    FERROUS SULFATE (FE TABS 325) 325 (65 FE) MG EC TABLET    Take 325 mg by mouth every morning    GABAPENTIN (NEURONTIN) 100 MG CAPSULE    Take 200 mg by mouth 3 times daily.  Takes 2 caps (=200mg) TID    HYDRALAZINE (APRESOLINE) 100 MG TABLET    Take 100 mg by mouth every 8 hours    INSULIN GLARGINE (LANTUS) 100 UNIT/ML INJECTION VIAL    Inject 20 Units into the skin daily     INSULIN LISPRO (HUMALOG) 100 UNIT/ML INJECTION VIAL    Inject into the skin 4 times daily (with meals and Cardiovascular:      Rate and Rhythm: Normal rate and regular rhythm. Pulmonary:      Effort: Pulmonary effort is normal.      Breath sounds: Normal breath sounds. Abdominal:      General: There is no distension. Palpations: Abdomen is soft. Tenderness: There is abdominal tenderness. Musculoskeletal:         General: Normal range of motion. Cervical back: Normal range of motion and neck supple. Skin:     General: Skin is warm. Findings: No rash. Neurological:      Mental Status: She is alert and oriented to person, place, and time.    Psychiatric:         Behavior: Behavior normal.                 DIAGNOSTIC RESULTS     EKG: All EKG's are interpreted by the Emergency Department Physician who either signs or Co-signs this chart in the absence of a cardiologist.        RADIOLOGY:   Non-plain film images such as CT, Ultrasound and MRI are read by the radiologist. Plain radiographic images arevisualized and preliminarily interpreted by the emergency physician with the below findings:        Interpretation per the Radiologist below, if available at thetime of this note:          ED BEDSIDE ULTRASOUND:   Performed by ED Physician - none    LABS:  Labs Reviewed   CBC WITH AUTO DIFFERENTIAL - Abnormal; Notable for the following components:       Result Value    WBC 11.4 (*)     Seg Neutrophils 89 (*)     Lymphocytes 6 (*)     Eosinophils % 0 (*)     Immature Granulocytes 1 (*)     Segs Absolute 10.03 (*)     Absolute Lymph # 0.73 (*)     All other components within normal limits   COMPREHENSIVE METABOLIC PANEL - Abnormal; Notable for the following components:    Glucose 394 (*)     BUN 39 (*)     CREATININE 3.21 (*)     Chloride 94 (*)     CO2 19 (*)     Anion Gap 23 (*)     Alkaline Phosphatase 234 (*)     GFR Non- 15 (*)     GFR  19 (*)     All other components within normal limits   TROP/MYOGLOBIN - Abnormal; Notable for the following components:    Troponin, High Sensitivity 142 (*)     Myoglobin 229 (*)     All other components within normal limits   TROP/MYOGLOBIN - Abnormal; Notable for the following components:    Troponin, High Sensitivity 142 (*)     Myoglobin 226 (*)     All other components within normal limits   TROP/MYOGLOBIN - Abnormal; Notable for the following components:    Troponin, High Sensitivity 142 (*)     Myoglobin 207 (*)     All other components within normal limits   BETA-HYDROXYBUTYRATE - Abnormal; Notable for the following components:    Beta-Hydroxybutyrate 2.44 (*)     All other components within normal limits   BRAIN NATRIURETIC PEPTIDE - Abnormal; Notable for the following components:    Pro-BNP 17,486 (*)     All other components within normal limits   POC GLUCOSE FINGERSTICK - Abnormal; Notable for the following components:    POC Glucose 379 (*)     All other components within normal limits   VENOUS BLOOD GAS, POINT OF CARE - Abnormal; Notable for the following components:    pH, Arnav 7.509 (*)     pCO2, Arnav 26.7 (*)     pO2, Arnav 64.1 (*)     HCO3, Venous 21.2 (*)     O2 Sat, Arnav 94 (*)     All other components within normal limits   POC GLUCOSE FINGERSTICK - Abnormal; Notable for the following components:    POC Glucose 312 (*)     All other components within normal limits   URINALYSIS WITH MICROSCOPIC   TROPONIN   URINE DRUG SCREEN       All other labs were within normal range or not returned as of this dictation. EMERGENCY DEPARTMENT COURSE and DIFFERENTIAL DIAGNOSIS/MDM:   Vitals:    Vitals:    12/01/21 1515 12/01/21 1545 12/01/21 1645 12/01/21 1740   BP: (!) 172/82 (!) 174/88 (!) 175/82 (!) 190/86   Pulse: 93 96 96 103   Resp: 17 18 22    Temp:       TempSrc:       SpO2:       Weight:       Height:         Will admit for HTN and BP control and symptomatic management of nausea and vomiting. She currently cannot tolerate PO . Patient also missed dialysis.       2:45 PM EST  Case discussed with Edmund Dominguez NP from [facility] and admission was accepted. 2:54 PM EST  Case discussed [facility] dr Andrea Onofre. No emergent dialysis at this time. Will optimize BP control. 4:47 PM EST  CASE Discussed with cardiologist Dr. Gwen Alexandra. Aware of elevated trops. Likely related to renal failure. No heparin at this time      Patient responded better to IV lopressor that the hydralazine that was initially given IV. CONSULTS:  IP CONSULT TO NEPHROLOGY  IP CONSULT TO HOSPITALIST  IP CONSULT TO CARDIOLOGY  IP CONSULT TO NEPHROLOGY    PROCEDURES:  Procedures  CRITICAL CARE TIME     Due to the high probability of sudden and clinically significant deterioration in the patient's condition she required highest level of my preparedness to intervene urgently. I provided critical care time including documentation time, medication orders and management, reevaluation, vital sign assessment, ordering and reviewing of of lab tests ordering and reviewing of x-ray studies, and admission orders. Aggregate critical care time is between 35 minutes including only time during which I was engaged in work directly related to her care and did not include time spent treating other patients simultaneously. FINAL IMPRESSION      1. Hypertension, unspecified type          DISPOSITION/PLAN   DISPOSITION        PATIENTREFERRED TO:   No follow-up provider specified.     DISCHARGE MEDICATIONS:     New Prescriptions    No medications on file           (Please note that portions of this note were completed with a voice recognition program.  Efforts were made to edit thedictations but occasionally words are mis-transcribed.)    KIMBER Colunga PA-C  12/01/21 9939

## 2021-12-02 LAB
ALBUMIN SERPL-MCNC: 3.8 G/DL (ref 3.5–5.2)
ALBUMIN/GLOBULIN RATIO: ABNORMAL (ref 1–2.5)
ALP BLD-CCNC: 175 U/L (ref 35–104)
ALT SERPL-CCNC: 16 U/L (ref 5–33)
ANION GAP SERPL CALCULATED.3IONS-SCNC: 14 MMOL/L (ref 9–17)
AST SERPL-CCNC: 30 U/L
BILIRUB SERPL-MCNC: 0.31 MG/DL (ref 0.3–1.2)
BUN BLDV-MCNC: 47 MG/DL (ref 6–20)
BUN/CREAT BLD: 11 (ref 9–20)
CALCIUM SERPL-MCNC: 8.9 MG/DL (ref 8.6–10.4)
CHLORIDE BLD-SCNC: 103 MMOL/L (ref 98–107)
CHOLESTEROL/HDL RATIO: 3.2
CHOLESTEROL: 232 MG/DL
CO2: 25 MMOL/L (ref 20–31)
CREAT SERPL-MCNC: 4.16 MG/DL (ref 0.5–0.9)
EKG ATRIAL RATE: 103 BPM
EKG P AXIS: 51 DEGREES
EKG P-R INTERVAL: 134 MS
EKG Q-T INTERVAL: 370 MS
EKG QRS DURATION: 72 MS
EKG QTC CALCULATION (BAZETT): 484 MS
EKG R AXIS: 23 DEGREES
EKG T AXIS: 53 DEGREES
EKG VENTRICULAR RATE: 103 BPM
ESTIMATED AVERAGE GLUCOSE: 140 MG/DL
GFR AFRICAN AMERICAN: 14 ML/MIN
GFR NON-AFRICAN AMERICAN: 11 ML/MIN
GFR SERPL CREATININE-BSD FRML MDRD: ABNORMAL ML/MIN/{1.73_M2}
GFR SERPL CREATININE-BSD FRML MDRD: ABNORMAL ML/MIN/{1.73_M2}
GLUCOSE BLD-MCNC: 106 MG/DL (ref 65–105)
GLUCOSE BLD-MCNC: 113 MG/DL (ref 65–105)
GLUCOSE BLD-MCNC: 164 MG/DL (ref 65–105)
GLUCOSE BLD-MCNC: 205 MG/DL (ref 65–105)
GLUCOSE BLD-MCNC: 48 MG/DL (ref 70–99)
GLUCOSE BLD-MCNC: 74 MG/DL (ref 65–105)
GLUCOSE BLD-MCNC: 81 MG/DL (ref 65–105)
HBA1C MFR BLD: 6.5 % (ref 4–6)
HCT VFR BLD CALC: 40.9 % (ref 36.3–47.1)
HDLC SERPL-MCNC: 73 MG/DL
HEMOGLOBIN: 12.8 G/DL (ref 11.9–15.1)
LDL CHOLESTEROL: 139 MG/DL (ref 0–130)
MCH RBC QN AUTO: 28.4 PG (ref 25.2–33.5)
MCHC RBC AUTO-ENTMCNC: 31.3 G/DL (ref 28.4–34.8)
MCV RBC AUTO: 90.7 FL (ref 82.6–102.9)
NRBC AUTOMATED: 0 PER 100 WBC
PDW BLD-RTO: 14.4 % (ref 11.8–14.4)
PLATELET # BLD: 142 K/UL (ref 138–453)
PMV BLD AUTO: 9.9 FL (ref 8.1–13.5)
POTASSIUM SERPL-SCNC: 4.4 MMOL/L (ref 3.7–5.3)
PROCALCITONIN: 0.36 NG/ML
RBC # BLD: 4.51 M/UL (ref 3.95–5.11)
SODIUM BLD-SCNC: 142 MMOL/L (ref 135–144)
TOTAL PROTEIN: 6.7 G/DL (ref 6.4–8.3)
TRIGL SERPL-MCNC: 100 MG/DL
TROPONIN INTERP: ABNORMAL
TROPONIN T: ABNORMAL NG/ML
TROPONIN, HIGH SENSITIVITY: 146 NG/L (ref 0–14)
VLDLC SERPL CALC-MCNC: ABNORMAL MG/DL (ref 1–30)
WBC # BLD: 7.6 K/UL (ref 3.5–11.3)

## 2021-12-02 PROCEDURE — 2500000003 HC RX 250 WO HCPCS: Performed by: NURSE PRACTITIONER

## 2021-12-02 PROCEDURE — 85027 COMPLETE CBC AUTOMATED: CPT

## 2021-12-02 PROCEDURE — 90935 HEMODIALYSIS ONE EVALUATION: CPT

## 2021-12-02 PROCEDURE — 82947 ASSAY GLUCOSE BLOOD QUANT: CPT

## 2021-12-02 PROCEDURE — 6360000002 HC RX W HCPCS: Performed by: INTERNAL MEDICINE

## 2021-12-02 PROCEDURE — 6370000000 HC RX 637 (ALT 250 FOR IP): Performed by: NURSE PRACTITIONER

## 2021-12-02 PROCEDURE — 2500000003 HC RX 250 WO HCPCS: Performed by: INTERNAL MEDICINE

## 2021-12-02 PROCEDURE — 93005 ELECTROCARDIOGRAM TRACING: CPT | Performed by: NURSE PRACTITIONER

## 2021-12-02 PROCEDURE — 84484 ASSAY OF TROPONIN QUANT: CPT

## 2021-12-02 PROCEDURE — 2060000000 HC ICU INTERMEDIATE R&B

## 2021-12-02 PROCEDURE — 5A1D70Z PERFORMANCE OF URINARY FILTRATION, INTERMITTENT, LESS THAN 6 HOURS PER DAY: ICD-10-PCS | Performed by: INTERNAL MEDICINE

## 2021-12-02 PROCEDURE — 36415 COLL VENOUS BLD VENIPUNCTURE: CPT

## 2021-12-02 PROCEDURE — 83036 HEMOGLOBIN GLYCOSYLATED A1C: CPT

## 2021-12-02 PROCEDURE — 6360000002 HC RX W HCPCS: Performed by: NURSE PRACTITIONER

## 2021-12-02 PROCEDURE — 2580000003 HC RX 258: Performed by: NURSE PRACTITIONER

## 2021-12-02 PROCEDURE — 80053 COMPREHEN METABOLIC PANEL: CPT

## 2021-12-02 PROCEDURE — 80061 LIPID PANEL: CPT

## 2021-12-02 PROCEDURE — 99232 SBSQ HOSP IP/OBS MODERATE 35: CPT | Performed by: INTERNAL MEDICINE

## 2021-12-02 RX ORDER — SODIUM CITRATE 4 % (5 ML)
1.8 SYRINGE (ML) MISCELLANEOUS ONCE
Status: COMPLETED | OUTPATIENT
Start: 2021-12-02 | End: 2021-12-02

## 2021-12-02 RX ORDER — NICOTINE POLACRILEX 4 MG
15 LOZENGE BUCCAL PRN
Status: DISCONTINUED | OUTPATIENT
Start: 2021-12-02 | End: 2021-12-03 | Stop reason: HOSPADM

## 2021-12-02 RX ORDER — DEXTROSE MONOHYDRATE 50 MG/ML
100 INJECTION, SOLUTION INTRAVENOUS PRN
Status: DISCONTINUED | OUTPATIENT
Start: 2021-12-02 | End: 2021-12-03 | Stop reason: HOSPADM

## 2021-12-02 RX ORDER — NICOTINE 21 MG/24HR
1 PATCH, TRANSDERMAL 24 HOURS TRANSDERMAL DAILY
Status: DISCONTINUED | OUTPATIENT
Start: 2021-12-03 | End: 2021-12-03 | Stop reason: HOSPADM

## 2021-12-02 RX ORDER — DEXTROSE MONOHYDRATE 25 G/50ML
12.5 INJECTION, SOLUTION INTRAVENOUS PRN
Status: DISCONTINUED | OUTPATIENT
Start: 2021-12-02 | End: 2021-12-03 | Stop reason: HOSPADM

## 2021-12-02 RX ORDER — SODIUM CITRATE 4 % (5 ML)
1.7 SYRINGE (ML) MISCELLANEOUS ONCE
Status: COMPLETED | OUTPATIENT
Start: 2021-12-02 | End: 2021-12-02

## 2021-12-02 RX ADMIN — MAGNESIUM OXIDE TAB 400 MG (241.3 MG ELEMENTAL MG) 400 MG: 400 (241.3 MG) TAB at 14:49

## 2021-12-02 RX ADMIN — ASPIRIN 81 MG: 81 TABLET, COATED ORAL at 08:57

## 2021-12-02 RX ADMIN — SODIUM CHLORIDE, PRESERVATIVE FREE 10 ML: 5 INJECTION INTRAVENOUS at 08:57

## 2021-12-02 RX ADMIN — ESCITALOPRAM OXALATE 20 MG: 10 TABLET ORAL at 08:56

## 2021-12-02 RX ADMIN — Medication 1 TABLET: at 08:56

## 2021-12-02 RX ADMIN — GABAPENTIN 200 MG: 100 CAPSULE ORAL at 14:30

## 2021-12-02 RX ADMIN — DEXTROSE MONOHYDRATE 12.5 G: 25 INJECTION, SOLUTION INTRAVENOUS at 06:33

## 2021-12-02 RX ADMIN — METRONIDAZOLE 500 MG: 500 INJECTION, SOLUTION INTRAVENOUS at 05:50

## 2021-12-02 RX ADMIN — BUMETANIDE 4 MG: 1 TABLET ORAL at 06:28

## 2021-12-02 RX ADMIN — METRONIDAZOLE 500 MG: 500 INJECTION, SOLUTION INTRAVENOUS at 20:52

## 2021-12-02 RX ADMIN — OXYBUTYNIN CHLORIDE 10 MG: 10 TABLET, EXTENDED RELEASE ORAL at 08:57

## 2021-12-02 RX ADMIN — GABAPENTIN 200 MG: 100 CAPSULE ORAL at 08:56

## 2021-12-02 RX ADMIN — Medication 1.7 ML: at 18:58

## 2021-12-02 RX ADMIN — HEPARIN SODIUM 5000 UNITS: 5000 INJECTION INTRAVENOUS; SUBCUTANEOUS at 05:33

## 2021-12-02 RX ADMIN — HEPARIN SODIUM 5000 UNITS: 5000 INJECTION INTRAVENOUS; SUBCUTANEOUS at 14:30

## 2021-12-02 RX ADMIN — CIPROFLOXACIN 400 MG: 2 INJECTION, SOLUTION INTRAVENOUS at 20:12

## 2021-12-02 RX ADMIN — MAGNESIUM OXIDE TAB 400 MG (241.3 MG ELEMENTAL MG) 400 MG: 400 (241.3 MG) TAB at 20:53

## 2021-12-02 RX ADMIN — NIFEDIPINE 60 MG: 30 TABLET, FILM COATED, EXTENDED RELEASE ORAL at 09:00

## 2021-12-02 RX ADMIN — ATORVASTATIN CALCIUM 10 MG: 10 TABLET, FILM COATED ORAL at 20:53

## 2021-12-02 RX ADMIN — LABETALOL HYDROCHLORIDE 600 MG: 200 TABLET, FILM COATED ORAL at 14:49

## 2021-12-02 RX ADMIN — PANTOPRAZOLE SODIUM 40 MG: 40 TABLET, DELAYED RELEASE ORAL at 16:00

## 2021-12-02 RX ADMIN — INSULIN LISPRO 1 UNITS: 100 INJECTION, SOLUTION INTRAVENOUS; SUBCUTANEOUS at 01:27

## 2021-12-02 RX ADMIN — GABAPENTIN 200 MG: 100 CAPSULE ORAL at 20:53

## 2021-12-02 RX ADMIN — METRONIDAZOLE 500 MG: 500 INJECTION, SOLUTION INTRAVENOUS at 13:00

## 2021-12-02 RX ADMIN — INSULIN GLARGINE 20 UNITS: 100 INJECTION, SOLUTION SUBCUTANEOUS at 20:54

## 2021-12-02 RX ADMIN — MAGNESIUM OXIDE TAB 400 MG (241.3 MG ELEMENTAL MG) 400 MG: 400 (241.3 MG) TAB at 08:57

## 2021-12-02 RX ADMIN — INSULIN LISPRO 1 UNITS: 100 INJECTION, SOLUTION INTRAVENOUS; SUBCUTANEOUS at 20:53

## 2021-12-02 RX ADMIN — FERROUS SULFATE TAB EC 325 MG (65 MG FE EQUIVALENT) 325 MG: 325 (65 FE) TABLET DELAYED RESPONSE at 08:57

## 2021-12-02 RX ADMIN — ERGOCALCIFEROL 50000 UNITS: 1.25 CAPSULE ORAL at 08:57

## 2021-12-02 RX ADMIN — PANTOPRAZOLE SODIUM 40 MG: 40 TABLET, DELAYED RELEASE ORAL at 06:28

## 2021-12-02 RX ADMIN — Medication 1.8 ML: at 18:59

## 2021-12-02 RX ADMIN — HEPARIN SODIUM 5000 UNITS: 5000 INJECTION INTRAVENOUS; SUBCUTANEOUS at 20:52

## 2021-12-02 ASSESSMENT — PAIN SCALES - GENERAL
PAINLEVEL_OUTOF10: 0
PAINLEVEL_OUTOF10: 0

## 2021-12-02 NOTE — PROGRESS NOTES
[facility]  Office: [address] [facility], DO, Ne [place], DO, Quincy Lerner, DO, Camille Thong Blood, DO, Qi Montes MD, Gael Dawson MD, Lorie Betancourt MD, Dylan Stephens MD, Kb Leonard MD, Janeth Van MD, Lazarus Moats, MD, Mendoza Brice, DO, Alysa Cuba, DO, Liana Vasquez MD,  Kwan Leach, DO, Cathy Szymanski MD, Loraine Rojas MD, Sachin Vargas MD, Tabitha Jimenes MD, Melanie Kenyon MD, Sona Joiner MD, Deborah Kelsey MD, Sebas Landis, [facility], [facility], CNP, Fariha Fountain, CNP, Casi Pennington, CNS, Que Victoria, CNP, Matheus Ford, CNP, Hanny Voss, CNP, Morena Nobles, CNP, Gaurav Reyna, CNP, IMELDA LandersC, Lula Wu, [facility], Donna Simon, CNP, Jatinder Saini, CNP, Sam Melara, CNP, Hattie Mccracken, CNP, General Olp, CNP, Nani Buenrostro, CNP, Elena Tovar, [address] [place] [address]    Progress Note    12/2/2021    9:53 AM    Name:   Nicole Granado  MRN:     [id number]     Acct:      [de-identified]   Room:   [address] [place] [address] Day:  1  Admit Date:  12/1/2021 12:07 PM    PCP:   ENEIDA Kaye CNP  Code Status:  Full Code    Subjective:     C/C:   Chief Complaint   Patient presents with    Hypertension     Interval History Status: improved. Patient seen this morning, blood pressure drastically lower last 24 hours. No complaints, admitted to stopping her blood pressure medications due to illness 2 days before admission. Agreeable with discharge    Brief History:     From H&P  Nicole Granado is a 49 y.o. Non- / non  female who presents with Hypertension   and is admitted to the hospital for the management of Hypertensive crisis.     Patient reports for the past 3 days she has had abdominal discomfort with nausea and diarrhea. Patient reports the symptoms came on suddenly and she denies fever chills.  Patient reports that over the past 24 hours her symptoms have worsened and she reported to her dialysis center today for her scheduled dialysis. Patient did not complete dialysis as she was found to be hypertensive with a headache and was therefore considered to be in hypertensive crisis. Patient was transported to the emergency department where a full evaluation was completed and CT imaging is consistent with colitis of unknown etiology. Patient's white count is minimally elevated and she has no fevers. Patient's hypertension was treated and she reports that her headache is improved however she is very tired and weak. At the time my exam patient is resting but easily aroused. Patient answers all questions appropriately. Physical exam is essentially unremarkable with the exception of tenderness to the epigastric region. Case was discussed in person with nephrology and they expressed their intention to dialyze tomorrow as her electrolytes are not significantly abnormal at this time. Review of Systems:     Constitutional:  negative for chills, fevers, sweats  Respiratory:  negative for cough, dyspnea on exertion, shortness of breath, wheezing  Cardiovascular:  negative for chest pain, chest pressure/discomfort, lower extremity edema, palpitations  Gastrointestinal:  negative for abdominal pain, constipation, diarrhea, nausea, vomiting  Neurological:  negative for dizziness, headache    Medications: Allergies:     Allergies   Allergen Reactions    Tape Miguel Ángel Xander Tape]        Current Meds:   Scheduled Meds:    insulin lispro  0-6 Units SubCUTAneous TID WC    insulin lispro  0-3 Units SubCUTAneous Nightly    promethazine  25 mg IntraMUSCular Once    morphine  4 mg IntraVENous Once    aspirin  81 mg Oral Daily    atorvastatin  10 mg Oral Nightly    daly-pedro  1 tablet Oral QAM    bumetanide  4 mg Oral BID AC    escitalopram  20 mg Oral QAM    ferrous sulfate  325 mg Oral QAM    gabapentin  200 mg Oral TID    hydrALAZINE  100 mg Oral Q8H    insulin glargine  20 Units SubCUTAneous Daily    labetalol  600 mg Oral TID    magnesium oxide  400 mg Oral TID    NIFEdipine  60 mg Oral BID    oxybutynin  10 mg Oral QAM    pantoprazole  40 mg Oral BID AC    vitamin D  50,000 Units Oral Weekly    sodium chloride flush  5-40 mL IntraVENous 2 times per day    heparin (porcine)  5,000 Units SubCUTAneous 3 times per day    metroNIDAZOLE  500 mg IntraVENous Q8H    ciprofloxacin  400 mg IntraVENous Q24H     Continuous Infusions:    dextrose      sodium chloride       PRN Meds: glucose, dextrose, glucagon (rDNA), dextrose, cloNIDine, ondansetron, sennosides-docusate sodium, sodium chloride flush, sodium chloride, acetaminophen **OR** acetaminophen, labetalol, hydrALAZINE    Data:     Past Medical History:   has a past medical history of Diabetes mellitus ([address] Diabetic neuropathy ([address] and Hypertension. Social History:   reports that she has been smoking. She has been smoking about 1.00 pack per day. She has never used smokeless tobacco. She reports current alcohol use of about 6.0 standard drinks of alcohol per week. She reports that she does not use drugs. Family History:   Family History   Problem Relation Age of Onset    No Known Problems Mother     No Known Problems Father        Vitals:  /61   Pulse 81   Temp 98.4 °F (36.9 °C) (Oral)   Resp 14   Ht 5' 3\" (1.6 m)   Wt 134 lb 3.2 oz (60.9 kg)   LMP  (LMP Unknown)   SpO2 99%   BMI 23.77 kg/m²   Temp (24hrs), Av.6 °F (37 °C), Min:98.4 °F (36.9 °C), Max:99 °F (37.2 °C)    Recent Labs     21  1736 21  0122 21  0708 21  0747   POCGLU 312* 164* 81 74       I/O (24Hr):     Intake/Output Summary (Last 24 hours) at 2021 0953  Last data filed at 2021 0902  Gross per 24 hour   Intake 490 ml   Output 300 ml   Net 190 ml       Labs:  Hematology:  Recent Labs     21  1234 21  0549   WBC 11.4* 7.6   RBC 5.06 4.51   HGB 14.3 12.8   HCT 44.6 40.9   MCV 88.1 90.7   MCH 28.3 28.4   MCHC 32.1 31.3 RDW 14.1 14.4    142   MPV 10.8 9.9     Chemistry:  Recent Labs     12/01/21  1234 12/01/21  1234 12/01/21  1432 12/01/21  1623 12/02/21  0022 12/02/21  0549     --   --   --   --  142   K 4.5  --   --   --   --  4.4   CL 94*  --   --   --   --  103   CO2 19*  --   --   --   --  25   GLUCOSE 394*  --   --   --   --  48*   BUN 39*  --   --   --   --  47*   CREATININE 3.21*  --   --   --   --  4.16*   ANIONGAP 23*  --   --   --   --  14   LABGLOM 15*  --   --   --   --  11*   GFRAA 19*  --   --   --   --  14*   CALCIUM 9.7  --   --   --   --  8.9   PROBNP 17,486*  --   --   --   --   --    TROPHS 142*   < > 142* 142* 146*  --    MYOGLOBIN 229*  --  226* 207*  --   --     < > = values in this interval not displayed. Recent Labs     12/01/21  1234 12/01/21  1235 12/01/21  1736 12/02/21  0122 12/02/21  0549 12/02/21  0708 12/02/21  0747   PROT 7.6  --   --   --  6.7  --   --    LABALBU 4.4  --   --   --  3.8  --   --    AST 21  --   --   --  30  --   --    ALT 14  --   --   --  16  --   --    ALKPHOS 234*  --   --   --  175*  --   --    BILITOT 0.71  --   --   --  0.31  --   --    CHOL  --   --   --   --  232*  --   --    HDL  --   --   --   --  73  --   --    LDLCHOLESTEROL  --   --   --   --  139*  --   --    CHOLHDLRATIO  --   --   --   --  3.2  --   --    TRIG  --   --   --   --  100  --   --    VLDL  --   --   --   --  NOT REPORTED  --   --    POCGLU  --  379* 312* 164*  --  81 74     ABG:  Lab Results   Component Value Date    FIO2 NOT REPORTED 12/01/2021     Lab Results   Component Value Date/Time    SPECIAL R HAND 12ML 12/01/2021 07:46 PM     Lab Results   Component Value Date/Time    CULTURE NO GROWTH 12 HOURS 12/01/2021 07:46 PM       Radiology:  CT ABDOMEN PELVIS WO CONTRAST Additional Contrast? None    Result Date: 12/1/2021  1. Nonspecific generalized peritoneal fat infiltration with small volume pelvic free fluid and some traces of fluid along the flanks right greater than left. Possibilities include peritonitis, colitis or other intra-infectious process. Apparent thickening of the descending colon probably due to underdistention and lack of contrast. 2. Punctate calcifications around the renal hilum could be vascular or intrarenal. 3. Cholelithiasis. 4. No evidence for bowel obstruction. XR CHEST PORTABLE    Result Date: 12/1/2021  Interval placement of double lumen right IJ central line terminating in right atrium. No acute process. Physical Examination:        General appearance:  alert, cooperative and no distress  Mental Status:  oriented to person, place and time and normal affect  Lungs:  clear to auscultation bilaterally, normal effort  Heart:  regular rate and rhythm, no murmur  Abdomen:  soft, nontender, nondistended, normal bowel sounds, no masses, hepatomegaly, splenomegaly  Extremities:  no edema, redness, tenderness in the calves  Skin:  no gross lesions, rashes, induration    Assessment:        Hospital Problems           Last Modified POA    * (Principal) Hypertensive crisis 12/1/2021 Yes    Colitis 12/1/2021 Yes    Epigastric pain 12/1/2021 Yes    Overview Signed 12/1/2021  7:05 PM by ENEIDA Belcher NP     Formatting of this note might be different from the original.  Added automatically from request for surgery [id number]         GERD (gastroesophageal reflux disease) 12/1/2021 Yes    Dyslipidemia, goal LDL below 70 12/1/2021 Yes    Uncontrolled type 2 diabetes mellitus ([address] 12/1/2021 Yes    Hypertensive emergency 12/1/2021 Yes    Essential hypertension 12/1/2021 Yes    ESRD (end stage renal disease) on dialysis ([address] 12/1/2021 Yes          Plan:        1. Patient normally goes to dialysis Monday Wednesday Friday, plan for dialysis today, patient may go tomorrow as previously scheduled. Cleared by nephrology for discharge  2. Blood pressure labile, initially over 200 now .   Will hold off on some blood pressure medications this morning and add as needed. Likely residual from multiple as needed pushes yesterday. 3. Monitor blood pressure today, if controlled throughout the day can discharge later this afternoon after dialysis.     Alysa Cuba,   12/2/2021  9:53 AM

## 2021-12-02 NOTE — CONSULTS
[address]  [phone]               Cardiology Consult           Date of Admission:  12/1/2021  Date of Consultation:  12/2/2021      PCP:  ENEIDA Fraga CNP      Chief Complaint: Patient sent to emergency room by dialysis center because of low blood pressure    History of Present Illness:  Zeina Gant is a 49 y.o. female who presents with low blood pressure at dialysis center. Patient has a history of end-stage renal disease and undergoes dialysis 3 times a day. She was seen in our office last back in September and at that time she was complaining of fluctuations in her blood pressure. Her blood pressure seem to be running high in the morning and then low in the evening. Her medications were adjusted at that time. She had an echocardiogram performed around that time which showed a hyperdynamic left ventricle with an ejection fraction greater than 70% no significant valvular abnormalities. There was no evidence of pericardial effusion. We were consulted by the emergency room because of high blood pressure and elevated high-sensitivity troponin levels. Patient denies any chest pain or chest pressure. She denies any shortness of breath. She has not had any palpitations and denies any dizziness lightheadedness presyncope or syncope despite the fluctuations in her blood pressures. She denies any excess fatigue or lethargy. She is able to carry out activities of daily living without problems. She has not noticed any significant lower extremity edema. She denies any orthopnea or PND. PMH:   has a past medical history of Diabetes mellitus ([address] Diabetic neuropathy ([address] and Hypertension. PSH:   has no past surgical history on file. Allergies: Allergies   Allergen Reactions    Tape Claude Erm Tape]         Home Meds:    Prior to Admission medications    Medication Sig Start Date End Date Taking?  Authorizing Provider   vitamin D (ERGOCALCIFEROL) 1.25 MG (70466 UT) CAPS capsule Take 50,000 Units by mouth once a week   Yes Historical Provider, MD   B Complex-C-Folic Acid (BRITNI-MARGARITO) TABS Take 1 tablet by mouth every morning   Yes Historical Provider, MD   labetalol (NORMODYNE) 300 MG tablet Take 600 mg by mouth 3 times daily Takes 2 tabs (=600mg) TID   Yes Historical Provider, MD   cloNIDine (CATAPRES) 0.1 MG tablet Take 0.1 mg by mouth every 6 hours as needed for High Blood Pressure (SBP>165)   Yes Historical Provider, MD   hydrALAZINE (APRESOLINE) 100 MG tablet Take 100 mg by mouth every 8 hours   Yes Historical Provider, MD   NIFEdipine (PROCARDIA XL) 60 MG extended release tablet Take 60 mg by mouth 2 times daily   Yes Historical Provider, MD   bumetanide (BUMEX) 2 MG tablet Take 4 mg by mouth 2 times daily (before meals) Takes 2 tabs (=4mg) BID before meals   Yes Historical Provider, MD   ondansetron (ZOFRAN-ODT) 4 MG disintegrating tablet Take 4 mg by mouth every 8 hours as needed for Nausea or Vomiting   Yes Historical Provider, MD   oxybutynin (DITROPAN-XL) 10 MG extended release tablet Take 10 mg by mouth every morning   Yes Historical Provider, MD   cetirizine (ZYRTEC) 10 MG tablet Take 10 mg by mouth daily   Yes Historical Provider, MD   escitalopram (LEXAPRO) 20 MG tablet Take 20 mg by mouth every morning   Yes Historical Provider, MD   ferrous sulfate (FE TABS 325) 325 (65 Fe) MG EC tablet Take 325 mg by mouth every morning   Yes Historical Provider, MD   magnesium oxide (MAG-OX) 400 MG tablet Take 400 mg by mouth 3 times daily   Yes Historical Provider, MD   sennosides-docusate sodium (SENOKOT-S) 8.6-50 MG tablet Take 2 tablets by mouth daily as needed for Constipation   Yes Historical Provider, MD   atorvastatin (LIPITOR) 10 MG tablet Take 10 mg by mouth nightly    Yes Historical Provider, MD   aspirin 81 MG EC tablet Take 81 mg by mouth daily   Yes Historical Provider, MD   gabapentin (NEURONTIN) 100 MG PRN David Herrera, APRN - NP        escitalopram (LEXAPRO) tablet 20 mg  20 mg Oral QAM David Herrera, APRN - NP        ferrous sulfate (FE TABS 325) EC tablet 325 mg  325 mg Oral QAM David Herrera, APRN - NP        gabapentin (NEURONTIN) capsule 200 mg  200 mg Oral TID David Herrera, APRN - NP   200 mg at 12/01/21 2312    hydrALAZINE (APRESOLINE) tablet 100 mg  100 mg Oral Q8H David Herrera, APRN - NP   100 mg at 12/01/21 2339    insulin glargine (LANTUS) injection vial 20 Units  20 Units SubCUTAneous Daily David Sharon, APRN - NP   20 Units at 12/01/21 1742    labetalol (NORMODYNE) tablet 600 mg  600 mg Oral TID David Herrera, APRN - NP   600 mg at 12/01/21 2206    magnesium oxide (MAG-OX) tablet 400 mg  400 mg Oral TID David Herrera, APRN - NP   400 mg at 12/01/21 2207    NIFEdipine (PROCARDIA XL) extended release tablet 60 mg  60 mg Oral BID David Herrera, APRN - NP   60 mg at 12/01/21 1740    ondansetron (ZOFRAN-ODT) disintegrating tablet 4 mg  4 mg Oral Q8H PRN David Herrera, APRN - NP        oxybutynin (DITROPAN-XL) extended release tablet 10 mg  10 mg Oral QAM David Herrera, APRN - NP        pantoprazole (PROTONIX) tablet 40 mg  40 mg Oral BID AC David Herrera, APRN - NP   40 mg at 12/02/21 9687    sennosides-docusate sodium (SENOKOT-S) 8.6-50 MG tablet 2 tablet  2 tablet Oral Daily PRN David Herrera, APRN - NP        vitamin D (ERGOCALCIFEROL) capsule 50,000 Units  50,000 Units Oral Weekly David Herrera, APRN - NP        sodium chloride flush 0.9 % injection 5-40 mL  5-40 mL IntraVENous 2 times per day David Herrera, APRN - NP        sodium chloride flush 0.9 % injection 5-40 mL  5-40 mL IntraVENous PRN David Herrera, APRN - NP        0.9 % sodium chloride infusion  25 mL IntraVENous PRN ENIEDA Gross NP        acetaminophen (TYLENOL) tablet 650 mg  650 mg Oral Q6H PRN ENEIDA Gross NP        Or   [facility] acetaminophen (TYLENOL) suppository 650 mg  650 mg Rectal Q6H PRN Opheim Sprinkles, APRN - NP        heparin (porcine) injection 5,000 Units  5,000 Units SubCUTAneous 3 times per day Opheim Sprinkles, APRN - NP   5,000 Units at 12/02/21 0533    labetalol (NORMODYNE;TRANDATE) injection 20 mg  20 mg IntraVENous Q10 Min PRN Opheim Sprinkles, APRN - NP        hydrALAZINE (APRESOLINE) injection 10 mg  10 mg IntraVENous Q6H PRN Tarah Sprinkles, APRN - NP        metronidazole (FLAGYL) 500 mg in NaCl 100 mL IVPB premix  500 mg IntraVENous Q8H Tarah Sprinkles, APRN -  mL/hr at 12/02/21 0550 500 mg at 12/02/21 0550    ciprofloxacin (CIPRO) IVPB 400 mg  400 mg IntraVENous Q24H Phillip DarielareginaldoDO   Stopped at 12/02/21 0123       Social History:       TOBACCO:   reports that she has been smoking. She has been smoking about 1.00 pack per day. She has never used smokeless tobacco.  ETOH:   reports current alcohol use of about 6.0 standard drinks of alcohol per week. DRUGS:  reports no history of drug use. OCCUPATION:          Family Histroy:         Problem Relation Age of Onset    No Known Problems Mother     No Known Problems Father            Review of Systems:   · Constitutional: there has been no unanticipated weight loss. There's been no change in energy level, sleep pattern, or activity level. · Eyes: No visual changes or diplopia. No scleral icterus. · ENT: No Headaches, hearing loss or vertigo. No mouth sores or sore throat. · Cardiovascular: See HPI  · Respiratory: No cough or wheezing, no sputum production. No hematemesis. · Gastrointestinal: No abdominal pain, appetite loss, blood in stools. No change in bowel or bladder habits. · Genitourinary: No dysuria, trouble voiding, or hematuria. · Musculoskeletal:  No gait disturbance, weakness or joint complaints. · Integumentary: No rash or pruritis. · Neurological: No headache, diplopia, change in muscle strength, numbness or tingling.  No change in gait, balance, coordination, mood, affect, memory, mentation, process. Apparent thickening of the descending colon probably due to underdistention and lack of contrast. 2. Punctate calcifications around the renal hilum could be vascular or intrarenal. 3. Cholelithiasis. 4. No evidence for bowel obstruction. XR CHEST PORTABLE    Result Date: 12/1/2021  Interval placement of double lumen right IJ central line terminating in right atrium. No acute process. Diagnosis:  Principal Problem:    Hypertensive crisis  Active Problems:    Colitis    Epigastric pain    GERD (gastroesophageal reflux disease)    Dyslipidemia, goal LDL below 70    Uncontrolled type 2 diabetes mellitus ([facility]    Hypertensive emergency    Essential hypertension    ESRD (end stage renal disease) on dialysis [facility])  Resolved Problems:    * No resolved hospital problems. *    1. elevated high-sensitivity troponin. This is of no clinical significance. Do not believe that this represents an unstable cardiac syndrome as the patient has no symptoms whatsoever. Most likely related to her labile blood pressure issues. Would not pursue an ischemic evaluation. Patient did have an echocardiogram performed about 3 months ago which was normal.    2. labile hypertension. Patient's blood pressure medications are different than what they were when we saw her in the office 3 months ago. I am not sure who made these changes. At that time she was on carvedilol. At this time she is on labetalol 600 mg 3 times a day. She is also taking clonidine on an as-needed basis which I do not believe she was on when we saw her in the office. She takes hydralazine 100 mg every 8 hours and nifedipine 60 mg twice a day. Most likely she has autonomic dysfunction from longstanding diabetes mellitus. Orthostatic blood pressures might be helpful. If this is the main problem we would tend to not treat the high blood pressures as aggressively. 3. end-stage renal disease on chronic hemodialysis.   Patient being followed by nephrology service.     Your other diagnosis      Plan:  Check orthostatic blood pressures  No ischemic evaluation planned  Try to optimize blood pressure medication so that she is not too high or too low        Electronically signed by Rubin Nicole MD on 12/2/2021 at 8:21 AM

## 2021-12-02 NOTE — PROGRESS NOTES
Pt states she was in [facility] for three weeks and where [facility] [address] line was placed and dialysis started. She states she has been home only three days and had one outpatient dialysis treatment on Monday at [facility]. She stated she has been losing weight and not taking home meds since she has been home.

## 2021-12-02 NOTE — PROGRESS NOTES
Physician Progress Note      PATIENT:               Margie Reaves  CSN #:                  [id number]  :                       1970  ADMIT DATE:       2021 12:07 PM  [address] [place] DATE:  Ag Juan  PROVIDER #:        Rafa MADRID          QUERY TEXT:    Pt admitted with HTN emergency. Pt noted to have slightly elevated WBC, ST on   arrival, Elevated Lactic . If possible, please document in the progress notes   and discharge summary if you are evaluating and /or treating any of the   following: The medical record reflects the following:  Risk Factors: ESRD, Colitis on CT, DM2  Clinical Indicators:  CT consistent with colitis of unknown etiology. Lactic 2.3 , WBC 11.4 , 7.6 , Tachycardia up to 107,  Treatment:  Cipro, Flagyl  Options provided:  -- Sepsis, present on admission  -- Sepsis was ruled out  -- Other - I will add my own diagnosis  -- Disagree - Not applicable / Not valid  -- Disagree - Clinically unable to determine / Unknown  -- Refer to Clinical Documentation Reviewer    PROVIDER RESPONSE TEXT:    After further study, sepsis was ruled out for this patient.     Query created by: Claudean Ralph on 2021 10:20 AM      Electronically signed by:  Rafa MADRID 2021 10:25 AM

## 2021-12-02 NOTE — PROGRESS NOTES
Pt.to room from ED via bed. Pt. was slightly groggy but had received Ativan recently in the ED. Pt. Became a/ox4 within 30 mins within 30 mins. Pts. Wallet locked up in room.

## 2021-12-02 NOTE — CONSULTS
REASON FOR  NEPHROLOGY CONSULT     Fluid and BP management in ESRD     ACCESS   Tunnel catheter    DRY WEIGHT   Unknown    NEPHROLOGIST   Dr. Jessica Colón renal care    HISTORY OF PRESENTING ILLNESS               This is a 49 y.o. female with end stage renal disease on hemodialysis Monday Wednesday Friday was transferred from a dialysis unit when noted to have systolic blood pressure [phone] prior to initiation of dialysis. Patient reported 1 day prior she has been having persistent nausea and vomiting and unable to take her home antihypertensive medications. She also reported headache. No history of fever/diarrhea. No prior history of gallbladder problems. Assessment in the ER showed uncontrolled hypertension. CT scan shows evidence of peritoneal filtration suspected colitis/peritonitis. Chest x-ray showed no evidence of pulmonary edema. Antibiotics with Cipro and Flagyl have been initiated by primary for colitis. We have been consulted for dialysis management. As stated prior the patient had missed dialysis yesterday because of high blood pressure. Blood pressure control improving after initiation of home hypertensive medications. Symptomatically she feels much better denies any nausea vomiting abdominal pain. PAST MEDICAL HISTORY         Diagnosis Date    Diabetes mellitus ([facility]     Diabetic neuropathy ([facility]     Hypertension          PAST SURGICAL HISTORY     No past surgical history on file.     MEDICATIONS     Home Meds:                Medications Prior to Admission: vitamin D (ERGOCALCIFEROL) 1.25 MG (75083 UT) CAPS capsule, Take 50,000 Units by mouth once a week  B Complex-C-Folic Acid (BRITNI-MARGARITO) TABS, Take 1 tablet by mouth every morning  labetalol (NORMODYNE) 300 MG tablet, Take 600 mg by mouth 3 times daily Takes 2 tabs (=600mg) TID  cloNIDine (CATAPRES) 0.1 MG tablet, Take 0.1 mg by mouth every 6 hours as needed for High Blood Pressure (SBP>165)  hydrALAZINE (APRESOLINE) 100 MG tablet, Take 100 mg by mouth every 8 hours  NIFEdipine (PROCARDIA XL) 60 MG extended release tablet, Take 60 mg by mouth 2 times daily  bumetanide (BUMEX) 2 MG tablet, Take 4 mg by mouth 2 times daily (before meals) Takes 2 tabs (=4mg) BID before meals  ondansetron (ZOFRAN-ODT) 4 MG disintegrating tablet, Take 4 mg by mouth every 8 hours as needed for Nausea or Vomiting  oxybutynin (DITROPAN-XL) 10 MG extended release tablet, Take 10 mg by mouth every morning  cetirizine (ZYRTEC) 10 MG tablet, Take 10 mg by mouth daily  escitalopram (LEXAPRO) 20 MG tablet, Take 20 mg by mouth every morning  ferrous sulfate (FE TABS 325) 325 (65 Fe) MG EC tablet, Take 325 mg by mouth every morning  magnesium oxide (MAG-OX) 400 MG tablet, Take 400 mg by mouth 3 times daily  sennosides-docusate sodium (SENOKOT-S) 8.6-50 MG tablet, Take 2 tablets by mouth daily as needed for Constipation  atorvastatin (LIPITOR) 10 MG tablet, Take 10 mg by mouth nightly   aspirin 81 MG EC tablet, Take 81 mg by mouth daily  gabapentin (NEURONTIN) 100 MG capsule, Take 200 mg by mouth 3 times daily.  Takes 2 caps (=200mg) TID  pantoprazole (PROTONIX) 40 MG tablet, Take 40 mg by mouth 2 times daily (before meals)   insulin lispro (HUMALOG) 100 UNIT/ML injection vial, Inject into the skin 4 times daily (with meals and nightly) Indications: sliding scale  insulin glargine (LANTUS) 100 UNIT/ML injection vial, Inject 20 Units into the skin daily   Scheduled Meds:    insulin lispro  0-6 Units SubCUTAneous TID WC    insulin lispro  0-3 Units SubCUTAneous Nightly    promethazine  25 mg IntraMUSCular Once    morphine  4 mg IntraVENous Once    aspirin  81 mg Oral Daily    atorvastatin  10 mg Oral Nightly    daly-pedro  1 tablet Oral QAM    bumetanide  4 mg Oral BID AC    escitalopram  20 mg Oral QAM    ferrous sulfate  325 mg Oral QAM    gabapentin  200 mg Oral TID    hydrALAZINE  100 mg Oral Q8H    insulin glargine  20 Units SubCUTAneous Daily    labetalol  600 mg Oral TID    magnesium oxide  400 mg Oral TID    NIFEdipine  60 mg Oral BID    oxybutynin  10 mg Oral QAM    pantoprazole  40 mg Oral BID AC    vitamin D  50,000 Units Oral Weekly    sodium chloride flush  5-40 mL IntraVENous 2 times per day    heparin (porcine)  5,000 Units SubCUTAneous 3 times per day    metroNIDAZOLE  500 mg IntraVENous Q8H    ciprofloxacin  400 mg IntraVENous Q24H     Continuous Infusions:    dextrose      sodium chloride       PRN Meds:  glucose, dextrose, glucagon (rDNA), dextrose, cloNIDine, ondansetron, sennosides-docusate sodium, sodium chloride flush, sodium chloride, acetaminophen **OR** acetaminophen, labetalol, hydrALAZINE    ALLERGY     Tape Dorothyann No tape]    SOCIAL HISTORY     Social History     Socioeconomic History    Marital status:      Spouse name: Not on file    Number of children: Not on file    Years of education: Not on file    Highest education level: Not on file   Occupational History    Not on file   Tobacco Use    Smoking status: Current Every Day Smoker     Packs/day: 1.00    Smokeless tobacco: Never Used   Vaping Use    Vaping Use: Never used   Substance and Sexual Activity    Alcohol use: Yes     Alcohol/week: 6.0 standard drinks     Types: 6 Cans of beer per week    Drug use: No    Sexual activity: Yes     Partners: Male   Other Topics Concern    Not on file   Social History Narrative    Not on file     Social Determinants of Health     Financial Resource Strain:     Difficulty of Paying Living Expenses: Not on file   Food Insecurity:     Worried About Running Out of Food in the Last Year: Not on file    Oliva of Food in the Last Year: Not on file   Transportation Needs:     Lack of Transportation (Medical): Not on file    Lack of Transportation (Non-Medical):  Not on file   Physical Activity:     Days of Exercise per Week: Not on file    Minutes of Exercise per Session: Not on file   Stress:     Feeling of Stress : Not on file   Social Connections:     Frequency of Communication with Friends and Family: Not on file    Frequency of Social Gatherings with Friends and Family: Not on file    Attends Oriental orthodox Services: Not on file    Active Member of [address] [place] [address] or Organizations: Not on file    Attends Club or Organization Meetings: Not on file    Marital Status: Not on file   Intimate Partner Violence:     Fear of Current or Ex-Partner: Not on file    Emotionally Abused: Not on file    Physically Abused: Not on file    Sexually Abused: Not on file   Housing Stability:     Unable to Pay for Housing in the Last Year: Not on file    Number of Amelie in the Last Year: Not on file    Unstable Housing in the Last Year: Not on file       FAMILY HISTORY      Family History   Problem Relation Age of Onset    No Known Problems Mother     No Known Problems Father           REVIEW OF SYSTEM      Constitutional: No asthenia/weight loss/anorexia    HEENT : No epistaxis/visual blurriness/rhinorrhoea/sorethroat/trauma  Cardiovascular:No chest pain/palpitation/SOB  Respiratory: No cough/fever/SOB/Wheezing    Gastrointestinal: No abdominal pain/present nausea/vomiting/no diarrhoea/constipation  Genitourinary: No dysuria/pyuria/hematuria/incomplete emptying of bladder  Musculoskeletal:  No gait disturbance/weakness or joint complaints  Integumentary: No rash or pruritis. Neurological: No headache/diplopia/change in muscle strength/numbness or tingling. No change in gait, balance, coordination, mood, affect, memory, mentation, behavior. Psychiatric: No anxiety/depression. Endocrine: No temperature intolerance. No excessive thirst, fluid intake, or urination. No tremor. Hematologic/Lymphatic: No abnormal bruising or bleeding, blood clots or swolle lymph nodes.   Allergic/Immunologic: No nasal congestion or hives    EXAMINATION       Vitals:    12/02/21 0359 12/02/21 0550 12/02/21 0615 12/02/21 0747   BP: 97/61  102/78 106/61   Pulse: 78   81   Resp: 16   14   Temp: 98.4 °F (36.9 °C)   98.4 °F (36.9 °C)   TempSrc:    Oral   SpO2: 100%   99%   Weight:  134 lb 3.2 oz (60.9 kg)     Height:         24HR INTAKE/OUTPUT:      Intake/Output Summary (Last 24 hours) at 12/2/2021 9713  Last data filed at 12/1/2021 2343  Gross per 24 hour   Intake 250 ml   Output 300 ml   Net -50 ml       General appearance:Awake, alert, in no acute distress  Skin: warm and dry, no rash or erythema  Eyes: conjunctivae normal and sclera anicteric  ENT: no thrush no pharyngeal congestion  orodental hygiene   Neck: No JVD, Lymphadenopathty or thyromegaly  Respiratory: vesicular breath sounds,no wheeze/crackles  Cardiovascular: S1 S2 normal,no gallop or organic murmur. No carotid bruit  Abdomen:Non tender/non distended. Bowel sounds present  Extremities: No Cyanosis or Clubbing,Lower extremity edema  Neurological:Alert and oriented. No abnormalities of mood, affect, memory, mentation, or behavior are noted    INVESTIGATIONS     PTH:  No results found for: PTH  abs:   CBC:   Recent Labs     12/01/21  1234 12/02/21  0549   WBC 11.4* 7.6   RBC 5.06 4.51   HGB 14.3 12.8   HCT 44.6 40.9   MCV 88.1 90.7   MCH 28.3 28.4   MCHC 32.1 31.3   RDW 14.1 14.4    142   MPV 10.8 9.9      BMP:   Recent Labs     12/01/21  1234 12/02/21  0549    142   K 4.5 4.4   CL 94* 103   CO2 19* 25   BUN 39* 47*   CREATININE 3.21* 4.16*   GLUCOSE 394* 48*   CALCIUM 9.7 8.9        Phosphorus:  No results for input(s): PHOS in the last 72 hours. Magnesium: No results for input(s): MG in the last 72 hours. Albumin:   Recent Labs     12/01/21  1234 12/02/21  0549   LABALBU 4.4 3.8       ASSESSMENT     #1 ESRD on maintenance hemodialysis using right IJ catheter Monday Wednesday Friday at [address] renal care.   Follows up with Dr. Jai Wright  On hemodialysis last 3 weeks  #2 admitted with nausea vomiting of 1 day duration and initial assessment showed uncontrolled hypertension with CT scan showing generalized peritoneal fatty infiltration suspected colitis/peritonitis  #3 type 2 diabetes  #4 essential hypertension  #5 secondary hyperparathyroidism    PLAN     #1 hemodialysis today as patient did not get dialysis yesterday. #2 hemodialysis tomorrow again  #3 resume all home antihypertensive medications  #4 resume all other home medications  #5 okay to discharge nephrology standpoint      Thank you for the consultation. Please do not hesitate to call with questions.     This note is created with the assistance of a speech-recognition program. While intending to generate a document that actually reflects the content of the visit, no guarantees can be provided that every mistake has been identified and corrected by editing      Joe Denney MD MD, [facility]), [address]   12/2/2021 8:21 AM  [facility]

## 2021-12-02 NOTE — PLAN OF CARE
Problem: Falls - Risk of:  Goal: Will remain free from falls  Description: Will remain free from falls  12/2/2021 0905 by Fahad Biggs RN  Outcome: Ongoing  Note: Patient remains free from falls and injuries. Call light with in reach and patient close to nurse station. Will continue to round hourly and more often as needed.      12/1/2021 5471 by Cristopher Sidhu RN  Outcome: Ongoing

## 2021-12-02 NOTE — PROGRESS NOTES
Patient's blood pressure this morning was 106/61 and a heart rate of 81. RN discussed holding blood pressure medications with Dr. Blas Godwin and the patient. Labetalol and hydralazine was held and procardia was given. Will reassess patients blood pressure throughout the day. Patient is having HD currently and plan is to discharge after HD. Will message Dr. Blas Godwin after HD for discharge orders. Patient alert and oriented, vss, and patient is agreeable to the plan.

## 2021-12-02 NOTE — PROGRESS NOTES
Pt. Ema Falk took IV out because she sleeps with her hands in between her legs and had them there when went to do am IV antibiotics.

## 2021-12-03 VITALS
SYSTOLIC BLOOD PRESSURE: 158 MMHG | HEIGHT: 63 IN | WEIGHT: 139.11 LBS | TEMPERATURE: 99.3 F | OXYGEN SATURATION: 97 % | DIASTOLIC BLOOD PRESSURE: 75 MMHG | HEART RATE: 92 BPM | BODY MASS INDEX: 24.65 KG/M2 | RESPIRATION RATE: 18 BRPM

## 2021-12-03 LAB
ABSOLUTE EOS #: 0.35 K/UL (ref 0–0.44)
ABSOLUTE IMMATURE GRANULOCYTE: 0.02 K/UL (ref 0–0.3)
ABSOLUTE LYMPH #: 1.09 K/UL (ref 1.1–3.7)
ABSOLUTE MONO #: 0.41 K/UL (ref 0.1–1.2)
ANION GAP SERPL CALCULATED.3IONS-SCNC: 12 MMOL/L (ref 9–17)
BASOPHILS # BLD: 1 % (ref 0–2)
BASOPHILS ABSOLUTE: 0.06 K/UL (ref 0–0.2)
BUN BLDV-MCNC: 18 MG/DL (ref 6–20)
BUN/CREAT BLD: 6 (ref 9–20)
CALCIUM SERPL-MCNC: 8.6 MG/DL (ref 8.6–10.4)
CHLORIDE BLD-SCNC: 102 MMOL/L (ref 98–107)
CO2: 23 MMOL/L (ref 20–31)
CREAT SERPL-MCNC: 3.06 MG/DL (ref 0.5–0.9)
DIFFERENTIAL TYPE: ABNORMAL
EKG ATRIAL RATE: 86 BPM
EKG P AXIS: 59 DEGREES
EKG P-R INTERVAL: 144 MS
EKG Q-T INTERVAL: 410 MS
EKG QRS DURATION: 66 MS
EKG QTC CALCULATION (BAZETT): 490 MS
EKG R AXIS: 24 DEGREES
EKG T AXIS: 55 DEGREES
EKG VENTRICULAR RATE: 86 BPM
EOSINOPHILS RELATIVE PERCENT: 6 % (ref 1–4)
GFR AFRICAN AMERICAN: 20 ML/MIN
GFR NON-AFRICAN AMERICAN: 16 ML/MIN
GFR SERPL CREATININE-BSD FRML MDRD: ABNORMAL ML/MIN/{1.73_M2}
GFR SERPL CREATININE-BSD FRML MDRD: ABNORMAL ML/MIN/{1.73_M2}
GLUCOSE BLD-MCNC: 106 MG/DL (ref 70–99)
GLUCOSE BLD-MCNC: 116 MG/DL (ref 65–105)
GLUCOSE BLD-MCNC: 131 MG/DL (ref 65–105)
GLUCOSE BLD-MCNC: 205 MG/DL (ref 65–105)
HCT VFR BLD CALC: 41.4 % (ref 36.3–47.1)
HEMOGLOBIN: 12.7 G/DL (ref 11.9–15.1)
IMMATURE GRANULOCYTES: 0 %
LYMPHOCYTES # BLD: 19 % (ref 24–43)
MCH RBC QN AUTO: 28.4 PG (ref 25.2–33.5)
MCHC RBC AUTO-ENTMCNC: 30.7 G/DL (ref 28.4–34.8)
MCV RBC AUTO: 92.6 FL (ref 82.6–102.9)
MONOCYTES # BLD: 7 % (ref 3–12)
NRBC AUTOMATED: 0 PER 100 WBC
PDW BLD-RTO: 14 % (ref 11.8–14.4)
PHOSPHORUS: 4 MG/DL (ref 2.6–4.5)
PLATELET # BLD: 141 K/UL (ref 138–453)
PLATELET ESTIMATE: ABNORMAL
PMV BLD AUTO: 10.9 FL (ref 8.1–13.5)
POTASSIUM SERPL-SCNC: 3.7 MMOL/L (ref 3.7–5.3)
RBC # BLD: 4.47 M/UL (ref 3.95–5.11)
RBC # BLD: ABNORMAL 10*6/UL
SEG NEUTROPHILS: 66 % (ref 36–65)
SEGMENTED NEUTROPHILS ABSOLUTE COUNT: 3.79 K/UL (ref 1.5–8.1)
SODIUM BLD-SCNC: 137 MMOL/L (ref 135–144)
WBC # BLD: 5.7 K/UL (ref 3.5–11.3)
WBC # BLD: ABNORMAL 10*3/UL

## 2021-12-03 PROCEDURE — 82947 ASSAY GLUCOSE BLOOD QUANT: CPT

## 2021-12-03 PROCEDURE — 36415 COLL VENOUS BLD VENIPUNCTURE: CPT

## 2021-12-03 PROCEDURE — 2500000003 HC RX 250 WO HCPCS: Performed by: INTERNAL MEDICINE

## 2021-12-03 PROCEDURE — 6370000000 HC RX 637 (ALT 250 FOR IP): Performed by: NURSE PRACTITIONER

## 2021-12-03 PROCEDURE — 6370000000 HC RX 637 (ALT 250 FOR IP): Performed by: INTERNAL MEDICINE

## 2021-12-03 PROCEDURE — 6360000002 HC RX W HCPCS: Performed by: NURSE PRACTITIONER

## 2021-12-03 PROCEDURE — 84100 ASSAY OF PHOSPHORUS: CPT

## 2021-12-03 PROCEDURE — 2500000003 HC RX 250 WO HCPCS: Performed by: NURSE PRACTITIONER

## 2021-12-03 PROCEDURE — 2580000003 HC RX 258: Performed by: NURSE PRACTITIONER

## 2021-12-03 PROCEDURE — 80048 BASIC METABOLIC PNL TOTAL CA: CPT

## 2021-12-03 PROCEDURE — 90935 HEMODIALYSIS ONE EVALUATION: CPT

## 2021-12-03 PROCEDURE — 85025 COMPLETE CBC W/AUTO DIFF WBC: CPT

## 2021-12-03 PROCEDURE — 99239 HOSP IP/OBS DSCHRG MGMT >30: CPT | Performed by: INTERNAL MEDICINE

## 2021-12-03 RX ORDER — SODIUM CITRATE 4 % (5 ML)
1.7 SYRINGE (ML) MISCELLANEOUS PRN
Status: DISCONTINUED | OUTPATIENT
Start: 2021-12-03 | End: 2021-12-03 | Stop reason: HOSPADM

## 2021-12-03 RX ORDER — LABETALOL 200 MG/1
300 TABLET, FILM COATED ORAL 3 TIMES DAILY
Status: DISCONTINUED | OUTPATIENT
Start: 2021-12-03 | End: 2021-12-03

## 2021-12-03 RX ORDER — SODIUM CITRATE 4 % (5 ML)
1.8 SYRINGE (ML) MISCELLANEOUS PRN
Status: DISCONTINUED | OUTPATIENT
Start: 2021-12-03 | End: 2021-12-03 | Stop reason: HOSPADM

## 2021-12-03 RX ORDER — HYDRALAZINE HYDROCHLORIDE 50 MG/1
100 TABLET, FILM COATED ORAL EVERY 8 HOURS SCHEDULED
Status: DISCONTINUED | OUTPATIENT
Start: 2021-12-03 | End: 2021-12-03 | Stop reason: HOSPADM

## 2021-12-03 RX ORDER — NIFEDIPINE 30 MG/1
60 TABLET, EXTENDED RELEASE ORAL DAILY
Status: DISCONTINUED | OUTPATIENT
Start: 2021-12-03 | End: 2021-12-03

## 2021-12-03 RX ORDER — NIFEDIPINE 30 MG/1
60 TABLET, EXTENDED RELEASE ORAL DAILY
Status: DISCONTINUED | OUTPATIENT
Start: 2021-12-03 | End: 2021-12-03 | Stop reason: HOSPADM

## 2021-12-03 RX ORDER — LABETALOL 200 MG/1
300 TABLET, FILM COATED ORAL 3 TIMES DAILY
Status: DISCONTINUED | OUTPATIENT
Start: 2021-12-03 | End: 2021-12-03 | Stop reason: HOSPADM

## 2021-12-03 RX ADMIN — NIFEDIPINE 60 MG: 30 TABLET, FILM COATED, EXTENDED RELEASE ORAL at 14:40

## 2021-12-03 RX ADMIN — HYDRALAZINE HYDROCHLORIDE 100 MG: 50 TABLET, FILM COATED ORAL at 13:56

## 2021-12-03 RX ADMIN — ASPIRIN 81 MG: 81 TABLET, COATED ORAL at 08:20

## 2021-12-03 RX ADMIN — HEPARIN SODIUM 5000 UNITS: 5000 INJECTION INTRAVENOUS; SUBCUTANEOUS at 13:56

## 2021-12-03 RX ADMIN — PANTOPRAZOLE SODIUM 40 MG: 40 TABLET, DELAYED RELEASE ORAL at 08:20

## 2021-12-03 RX ADMIN — MAGNESIUM OXIDE TAB 400 MG (241.3 MG ELEMENTAL MG) 400 MG: 400 (241.3 MG) TAB at 13:56

## 2021-12-03 RX ADMIN — BUMETANIDE 4 MG: 1 TABLET ORAL at 17:24

## 2021-12-03 RX ADMIN — LABETALOL HYDROCHLORIDE 300 MG: 200 TABLET, FILM COATED ORAL at 13:56

## 2021-12-03 RX ADMIN — GABAPENTIN 200 MG: 100 CAPSULE ORAL at 08:20

## 2021-12-03 RX ADMIN — ESCITALOPRAM OXALATE 20 MG: 10 TABLET ORAL at 08:20

## 2021-12-03 RX ADMIN — INSULIN LISPRO 2 UNITS: 100 INJECTION, SOLUTION INTRAVENOUS; SUBCUTANEOUS at 17:24

## 2021-12-03 RX ADMIN — OXYBUTYNIN CHLORIDE 10 MG: 10 TABLET, EXTENDED RELEASE ORAL at 08:20

## 2021-12-03 RX ADMIN — PANTOPRAZOLE SODIUM 40 MG: 40 TABLET, DELAYED RELEASE ORAL at 17:24

## 2021-12-03 RX ADMIN — METRONIDAZOLE 500 MG: 500 INJECTION, SOLUTION INTRAVENOUS at 05:16

## 2021-12-03 RX ADMIN — MAGNESIUM OXIDE TAB 400 MG (241.3 MG ELEMENTAL MG) 400 MG: 400 (241.3 MG) TAB at 08:20

## 2021-12-03 RX ADMIN — Medication 1.8 ML: at 13:34

## 2021-12-03 RX ADMIN — Medication 1.7 ML: at 13:34

## 2021-12-03 RX ADMIN — FERROUS SULFATE TAB EC 325 MG (65 MG FE EQUIVALENT) 325 MG: 325 (65 FE) TABLET DELAYED RESPONSE at 08:21

## 2021-12-03 RX ADMIN — HEPARIN SODIUM 5000 UNITS: 5000 INJECTION INTRAVENOUS; SUBCUTANEOUS at 06:08

## 2021-12-03 RX ADMIN — PANTOPRAZOLE SODIUM 40 MG: 40 TABLET, DELAYED RELEASE ORAL at 06:09

## 2021-12-03 RX ADMIN — SODIUM CHLORIDE, PRESERVATIVE FREE 10 ML: 5 INJECTION INTRAVENOUS at 08:22

## 2021-12-03 RX ADMIN — METRONIDAZOLE 500 MG: 500 INJECTION, SOLUTION INTRAVENOUS at 13:56

## 2021-12-03 RX ADMIN — Medication 1 TABLET: at 08:20

## 2021-12-03 RX ADMIN — GABAPENTIN 200 MG: 100 CAPSULE ORAL at 13:56

## 2021-12-03 ASSESSMENT — PAIN SCALES - GENERAL
PAINLEVEL_OUTOF10: 0

## 2021-12-03 NOTE — DISCHARGE SUMMARY
[facility]  Office: [address], DO, Zari Press, DO, Silvia Cushing, DO, Rory Josephsergo Blood, DO, Talya Aponte MD, Arin Mittal MD, Aram Snow MD, Bernice Clinton MD, Stephen Verma MD, Henny Mendoza MD, Kina Sauceda MD, Osmin Quiroga, DO, Gretchen Batista, DO, Narciso Collazo MD,  Gena Siemens, DO, Robby Rajan MD, Zehra Gale MD, Kyung Byers MD, Itzel Shelton MD, Hugo Lennon MD, Nena Calvin MD, Usama Espinal MD, Socrates Black, [facility], [facility], CNP, Luis Swartz, CNP, Elena Pena, CNS, Carey Marx, [facility], Shaheed Uriarte, CNP, Donald Sifuentes, CNP, Kyle Vieyra, CNP, Debby Carranza, CNP, Roylene Goodell, PA-C, Petros Covarrubias, [facility], Gayle Navarrete, CNP, Sparkle Estrada, CNP, Safia Stone, CNP, Dora Ponce, CNP, Jimmy Treadwell, CNP, Trevon Bunn, [facility], [facility], [facility]    Discharge Summary     Patient ID: Jackie Bonilla  :  1970   MRN: [id number]     ACCOUNT:  [de-identified]   Patient's PCP: ENEIDA Webb CNP  Admit Date: 2021   Discharge Date: 12/3/2021     Length of Stay: 2  Code Status:  Full Code  Admitting Physician: Gretchen Batista DO  Discharge Physician: Gretchen Batista DO     Active Discharge Diagnoses:     Hospital Problem Lists:  Principal Problem:    Hypertensive crisis  Active Problems:    Colitis    Epigastric pain    GERD (gastroesophageal reflux disease)    Dyslipidemia, goal LDL below 70    Uncontrolled type 2 diabetes mellitus ([facility] [address] [facility]    Hypertensive emergency    Essential hypertension    ESRD (end stage renal disease) on dialysis [facility])  Resolved Problems:    * No resolved hospital problems.  *      Admission Condition:  good     Discharged Condition: good    Hospital Stay:     Hospital Course:  Catherine Khan is a 49 y.o. Non- / non  female who presents with Hypertension   and is admitted to the hospital for the management of Hypertensive crisis.     Patient reports for the past 3 days she has had abdominal discomfort with nausea and diarrhea. Patient reports the symptoms came on suddenly and she denies fever chills. Patient reports that over the past 24 hours her symptoms have worsened and she reported to her dialysis center today for her scheduled dialysis. Patient stated for the past 2 days did not feel well and had not been taking her blood pressure medications. Patient did not complete dialysis as she was found to be hypertensive with a headache and was therefore considered to be in hypertensive crisis. Patient was transported to the emergency department where a full evaluation was completed and CT imaging is consistent with colitis of unknown etiology. Patient's white count is minimally elevated and she has no fevers. Patient's hypertension was treated and she reports that her headache is improved however she is very tired and weak. At the time my exam patient is resting but easily aroused. Patient answers all questions appropriately. Physical exam is essentially unremarkable with the exception of tenderness to the epigastric region. Case was discussed in person with nephrology and they expressed their intention to dialyze tomorrow as her electrolytes are not significantly abnormal at this time. Patient was dialyzed the following day and placed back on her home blood pressure medications. During dialysis patient became hypotensive but asymptomatic. Her discharge was held until the following day and her blood pressure was monitored. Blood pressure was adequate so the next 24 hours the patient was discharged to follow-up outpatient with PCP. Discussed going home and resuming all blood pressure medications as previously prescribed.         Significant therapeutic interventions: none    Significant Diagnostic Studies:   Labs / Micro:  CBC:   Lab Results   Component Value Date    WBC 5.7 12/03/2021    RBC 4.47 12/03/2021    HGB 12.7 12/03/2021    HCT 41.4 12/03/2021    MCV 92.6 12/03/2021    MCH 28.4 12/03/2021    MCHC 30.7 12/03/2021    RDW 14.0 12/03/2021     12/03/2021     BMP:    Lab Results   Component Value Date    GLUCOSE 106 12/03/2021     12/03/2021    K 3.7 12/03/2021     12/03/2021    CO2 23 12/03/2021    ANIONGAP 12 12/03/2021    BUN 18 12/03/2021    CREATININE 3.06 12/03/2021    BUNCRER 6 12/03/2021    CALCIUM 8.6 12/03/2021    LABGLOM 16 12/03/2021    GFRAA 20 12/03/2021    GFR      12/03/2021    GFR NOT REPORTED 12/03/2021     HFP:    Lab Results   Component Value Date    PROT 6.7 12/02/2021     CMP:    Lab Results   Component Value Date    GLUCOSE 106 12/03/2021     12/03/2021    K 3.7 12/03/2021     12/03/2021    CO2 23 12/03/2021    BUN 18 12/03/2021    CREATININE 3.06 12/03/2021    ANIONGAP 12 12/03/2021    ALKPHOS 175 12/02/2021    ALT 16 12/02/2021    AST 30 12/02/2021    BILITOT 0.31 12/02/2021    LABALBU 3.8 12/02/2021    ALBUMIN NOT REPORTED 12/02/2021    LABGLOM 16 12/03/2021    GFRAA 20 12/03/2021    GFR      12/03/2021    GFR NOT REPORTED 12/03/2021    PROT 6.7 12/02/2021    CALCIUM 8.6 12/03/2021     PT/INR:  No results found for: PTINR, PROTIME, INR     Radiology:  CT ABDOMEN PELVIS WO CONTRAST Additional Contrast? None    Result Date: 12/1/2021  1. Nonspecific generalized peritoneal fat infiltration with small volume pelvic free fluid and some traces of fluid along the flanks right greater than left. Possibilities include peritonitis, colitis or other intra-infectious process. Apparent thickening of the descending colon probably due to underdistention and lack of contrast. 2. Punctate calcifications around the renal hilum could be vascular or intrarenal. 3. Cholelithiasis. 4. No evidence for bowel obstruction. XR CHEST PORTABLE    Result Date: 12/1/2021  Interval placement of double lumen right IJ central line terminating in right atrium. No acute process.        Consultations: Consults:     Final Specialist Recommendations/Findings:   IP CONSULT TO NEPHROLOGY  IP CONSULT TO HOSPITALIST  IP CONSULT TO CARDIOLOGY  IP CONSULT TO NEPHROLOGY      The patient was seen and examined on day of discharge and this discharge summary is in conjunction with any daily progress note from day of discharge. Discharge plan:     Disposition: Home    Physician Follow Up:   ENEIDA Henning - CNP  [address] [place] [address] [facility]  [phone]    Schedule an appointment as soon as possible for a visit in 1 week  hospital followup       Requiring Further Evaluation/Follow Up POST HOSPITALIZATION/Incidental Findings: none    Diet: regular diet    Activity: As tolerated    Instructions to Patient: none    Discharge Medications:      Medication List      CHANGE how you take these medications    oxybutynin 10 MG extended release tablet  Commonly known as: DITROPAN-XL  What changed: Another medication with the same name was removed. Continue taking this medication, and follow the directions you see here.         CONTINUE taking these medications    aspirin 81 MG EC tablet     atorvastatin 10 MG tablet  Commonly known as: LIPITOR     bumetanide 2 MG tablet  Commonly known as: BUMEX     cetirizine 10 MG tablet  Commonly known as: ZYRTEC     cloNIDine 0.1 MG tablet  Commonly known as: CATAPRES     ferrous sulfate 325 (65 Fe) MG EC tablet  Commonly known as: FE TABS 325     gabapentin 100 MG capsule  Commonly known as: NEURONTIN     hydrALAZINE 100 MG tablet  Commonly known as: APRESOLINE     insulin glargine 100 UNIT/ML injection vial  Commonly known as: LANTUS     insulin lispro 100 UNIT/ML injection vial  Commonly known as: HUMALOG     labetalol 300 MG tablet  Commonly known as: NORMODYNE     Lexapro 20 MG tablet  Generic drug: escitalopram     magnesium oxide 400 MG tablet  Commonly known as: MAG-OX     NIFEdipine 60 MG extended release tablet  Commonly known as: PROCARDIA XL     ondansetron 4 MG disintegrating tablet  Commonly known as: ZOFRAN-ODT     pantoprazole 40 MG tablet  Commonly known as: PROTONIX     daly-pedro Tabs     sennosides-docusate sodium 8.6-50 MG tablet  Commonly known as: SENOKOT-S     vitamin D 1.25 MG (95135 UT) Caps capsule  Commonly known as: ERGOCALCIFEROL        STOP taking these medications    amLODIPine 10 MG tablet  Commonly known as: NORVASC     fluticasone 50 MCG/ACT nasal spray  Commonly known as: FLONASE     sertraline 50 MG tablet  Commonly known as: ZOLOFT            No discharge procedures on file. Time Spent on discharge is  37 mins in patient examination, evaluation, counseling as well as medication reconciliation, prescriptions for required medications, discharge plan and follow up. Electronically signed by   Florentin Constantino DO  12/3/2021  4:54 PM      Thank you Dr. Gerri Vitale, APRN - CNP for the opportunity to be involved in this patient's care.

## 2021-12-03 NOTE — PROGRESS NOTES
Pt d/c via cab to home at [phone]. Discharge teaching and instructions for diagnosis of HTN/kidney disease completed with pt using teach back method. AVS reviewed. Pt voiced understanding re: f/u appts, and self care at home. All questions answered.

## 2021-12-03 NOTE — PROGRESS NOTES
Previous injuries reviewed. Nephrology feels it is okay for patient be discharged home. Patient's blood pressure is under better control. No further cardiac work-up is warranted at this point. We agree the patient can be discharged from a cardiac standpoint. We will arrange follow-up in the office.

## 2021-12-03 NOTE — PROGRESS NOTES
Nephrology Progress Note        Subjective: The patient is seen and evaluated for dialysis. She had her dialysis yesterday because she missed the day before. She has been on dialysis for about 3 weeks. She has a tunneled dialysis catheter in place. No fluid overload. Urine output is low. Her next dialysis after today will be on Monday, 2021. The goal is dialysis today and then discharge. Her blood pressures are much better controlled than they were when she came into the hospital.  We will be holding her antihypertensive medications prior to her dialysis today. Labs show sodium 137 with potassium 3.7 chloride 102 and CO2 23 BUN 18 creatinine 3.06 calcium 8.6 and phosphorus 4.       Objective:  CURRENT TEMPERATURE:  Temp: 98.4 °F (36.9 °C)  MAXIMUM TEMPERATURE OVER 24HRS:  Temp (24hrs), Av.4 °F (36.9 °C), Min:98.1 °F (36.7 °C), Max:99.3 °F (37.4 °C)    CURRENT RESPIRATORY RATE:  Resp: 16  CURRENT PULSE:  Pulse: 77  CURRENT BLOOD PRESSURE:  BP: 113/65  24HR BLOOD PRESSURE RANGE:  Systolic (47XWV), DANETTE:939 , Min:73 , XRE:096   ; Diastolic (55ABF), TWN:16, Min:46, Max:74    24HR INTAKE/OUTPUT:    Intake/Output Summary (Last 24 hours) at 12/3/2021 0811  Last data filed at 2021 2256  Gross per 24 hour   Intake 2784.2 ml   Output 1500 ml   Net 1284.2 ml     Patient Vitals for the past 96 hrs (Last 3 readings):   Weight   21 0545 140 lb 6 oz (63.7 kg)   21 1847 140 lb 6.9 oz (63.7 kg)   21 1538 139 lb 15.9 oz (63.5 kg)         Physical Exam:  General appearance:Awake, alert, in no acute distress, on room air  Skin: warm and dry, no rash or erythema  Eyes: conjunctivae normal and sclera anicteric  ENT: Moist mucous membranes  Neck:  No JVD, midline trachea, no accessory muscle use  Pulmonary: clear to auscultation and no wheezing or rhonchi   Cardiovascular: Regular rate and rhythm positive S1 and S2 and no rubs  Abdomen:   no ascites, soft and not tender and not distended with

## 2021-12-03 NOTE — PROGRESS NOTES
Comprehensive Nutrition Assessment    Type and Reason for Visit:  Initial, Positive Nutrition Screen (2-13 lb weight loss, decreased appetite, malnutrition score:2 ; dietitian consult: weight loss, follows renal diet)    Nutrition Recommendations/Plan:   1. Continue current diet ADULT DIET; Regular; 4 carb choices (60 gm/meal); No Added Salt (3-4 gm); Low Potassium (Less than 3000 mg/day); Low Phosphorus (Less than 1000 mg); 1500 ml  2. Monitor po intakes, need for ONS, and labs    Nutrition Assessment:  Patient admission related to hypertensive crisis. Patient had dialysis today. Seen laying in bed- she just had lunch- consumed 100% hamburger. She reports poor appetite since Monday- appetite now improving. UBW: 145# - unknown time frame of wt loss. She has no questions regarding renal diet. She does not want ONS as this time. Will continue current diet and monitor po intakes and labs. Malnutrition Assessment:  Malnutrition Status: At risk for malnutrition (Comment)    Context:  Acute Illness     Findings of the 6 clinical characteristics of malnutrition:  Energy Intake:  Mild decrease in energy intake (Comment)  Weight Loss:  Unable to assess     Body Fat Loss:  No significant body fat loss     Muscle Mass Loss:  No significant muscle mass loss    Fluid Accumulation:  No significant fluid accumulation     Strength:  Not Performed    Estimated Daily Nutrient Needs:  Energy (kcal):  1146-2796 kcal (28-30 kcal/kg); Weight Used for Energy Requirements:        Protein (g):  77-90 gm protein (1.2-1.4 g/kg); Weight Used for Protein Requirements:  Current        Fluid (ml/day):  1500 mL fluid restriction per physician; Method Used for Fluid Requirements:  Other (Comment)      Nutrition Related Findings:  GI: WDL. No edema. Hemodialysis. HTN. Wounds:  None       Current Nutrition Therapies:    ADULT DIET; Regular; 4 carb choices (60 gm/meal); No Added Salt (3-4 gm);  Low Potassium (Less than 3000 mg/day); Low Phosphorus (Less than 1000 mg); 1500 ml    Anthropometric Measures:  · Height: 5' 3\" (160 cm)  · Current Body Weight: 140 lb (63.5 kg)   · Admission Body Weight: 120 lb (54.4 kg) (estimated)    · Usual Body Weight: 145 lb (65.8 kg)     · Ideal Body Weight: 115 lbs; % Ideal Body Weight 121.7 %   · BMI: 24.8  · Adjusted Body Weight:  ; No Adjustment   · BMI Categories: Normal Weight (BMI 18.5-24. 9)       Nutrition Diagnosis:   · Increased nutrient needs related to increase demand for energy/nutrients as evidenced by dialysis    · Inadequate protein-energy intake related to inadequate protein-energy intake as evidenced by poor intake prior to admission, weight loss    Nutrition Interventions:   Food and/or Nutrient Delivery:  Continue Current Diet  Nutrition Education/Counseling:  Education not indicated   Coordination of Nutrition Care:  Continue to monitor while inpatient    Goals:  PO intakes to provide >75% of estimated nutritional  needs       Nutrition Monitoring and Evaluation:   Behavioral-Environmental Outcomes:  None Identified   Food/Nutrient Intake Outcomes:  Food and Nutrient Intake  Physical Signs/Symptoms Outcomes:  Biochemical Data, Fluid Status or Edema, Weight, Skin     Discharge Planning:    Continue current diet     Sonya Cole, [address] [place] [address]  Office Number: [phone]

## 2021-12-03 NOTE — PROGRESS NOTES
Urine tox. Screen not collected d/t ED gave pt. both Morphine and Ativan. Pt. states she smokes marijuana approx. once per week.  Patient states she smokes 1/2 pack cigarettes/day

## 2021-12-03 NOTE — PROGRESS NOTES
HEMODIALYSIS POST TREATMENT NOTE    Treatment time ordered:300    Actual treatment time: 300    UltraFiltration Goal: 1000  UltraFiltration Removed:500      Pre Treatment weight: 63.6  Post Treatment weight: 63.1  Estimated Dry Weight: na    Access used:     Central Venous Catheter:          Tunneled or Non-tunneled: tunneled           Site: right chest          Access Flow: good      Internal Access:       AV Fistula or AV Graft: na         Site: na       Access Flow: na       Sign and symptoms of infection: none       If YES: na    Medications or blood products given: no    Chronic outpatient schedule: [facility]    Chronic outpatient unit: [facility]    Summary of response to treatment: pt did well    Explain if orders NOT met, was physician notified:alva      ACES flowsheet faxed to patient unit/ placed in patient chart: yes    Post assessment completed: yes    Report given to: [address] documented Safety Checks include the followin) Access and face visible at all times. 2) All connections and blood lines are secure with no kinks. 3) NVL alarm engaged. 4) Hemosafe device applied (if applicable). 5) No collapse of Arterial or Venous blood chambers. 6) All blood lines / pump segments in the air detectors.

## 2021-12-03 NOTE — PROGRESS NOTES
[facility]  Office: [address], DO, Luis Alberto Salazar, DO, Rachel Hernandez, DO, Tyra Morris Blood, DO, Itzel Birch MD, Riaz Delgado MD, Marianne Hall MD, Radha May MD, Aime Caceres MD, Yann Cárdenas MD, Christi Arrington MD, Ebonie Fajardo, DO, Dianelys Foster, DO, Alison Barth MD,  Bella Wetzel, DO, Marietta Heart MD, Caitlin Hyatt MD, Fadumo Jacobson MD, Vivi Don MD, Aren Dangelo MD, Dylon Rascon MD, Hal Bello MD, Jose Daniel Sexton [facility], [facility], CNP, Renato Martins, CNP, Carina Briones, CNS, Shamir Willard, CNP, Ho Blancas, CNP, Josefa Duncan, CNP, Lavern Camarena, CNP, Mayra Azul, CNP, Bernardo Torre PA-C, Oscar Gillespie, [facility], Kaitlin Senior, CNP, Marilu Rico, CNP, Sandeep Sampson, CNP, Angelique Fernández, CNP, Marcie Ortega, CNP, Robert Singh, CNP, Brooklyn Mejia, [address] [place] [address]    Progress Note    12/3/2021    11:05 AM    Name:   Amado Worley  MRN:     [id number]     Acct:      [de-identified]   Room:   [address] [place] [address] Day:  2  Admit Date:  12/1/2021 12:07 PM    PCP:   ENEIDA Sin CNP  Code Status:  Full Code    Subjective:     C/C:   Chief Complaint   Patient presents with    Hypertension     Interval History Status: improved. Patient's blood pressure 180-190. Patient's blood pressure medications were held overnight sporadically. Held this morning. Discussed restarting her home regimen    Brief History:     From H&P  Amado Worley is a 49 y.o. Non- / non  female who presents with Hypertension   and is admitted to the hospital for the management of Hypertensive crisis.     Patient reports for the past 3 days she has had abdominal discomfort with nausea and diarrhea. Patient reports the symptoms came on suddenly and she denies fever chills.  Patient reports that over the past 24 hours her symptoms have worsened and she reported to her dialysis center today for her scheduled dialysis. Patient did not complete dialysis as she was found to be hypertensive with a headache and was therefore considered to be in hypertensive crisis. Patient was transported to the emergency department where a full evaluation was completed and CT imaging is consistent with colitis of unknown etiology. Patient's white count is minimally elevated and she has no fevers. Patient's hypertension was treated and she reports that her headache is improved however she is very tired and weak. At the time my exam patient is resting but easily aroused. Patient answers all questions appropriately. Physical exam is essentially unremarkable with the exception of tenderness to the epigastric region. Case was discussed in person with nephrology and they expressed their intention to dialyze tomorrow as her electrolytes are not significantly abnormal at this time. Review of Systems:     Constitutional:  negative for chills, fevers, sweats  Respiratory:  negative for cough, dyspnea on exertion, shortness of breath, wheezing  Cardiovascular:  negative for chest pain, chest pressure/discomfort, lower extremity edema, palpitations  Gastrointestinal:  negative for abdominal pain, constipation, diarrhea, nausea, vomiting  Neurological:  negative for dizziness, headache    Medications: Allergies:     Allergies   Allergen Reactions    Tape Hershall Means Tape]        Current Meds:   Scheduled Meds:    hydrALAZINE  100 mg Oral 3 times per day    labetalol  300 mg Oral TID    NIFEdipine  60 mg Oral Daily    insulin lispro  0-6 Units SubCUTAneous TID WC    insulin lispro  0-3 Units SubCUTAneous Nightly    nicotine  1 patch TransDERmal Daily    promethazine  25 mg IntraMUSCular Once    morphine  4 mg IntraVENous Once    aspirin  81 mg Oral Daily    atorvastatin  10 mg Oral Nightly    daly-pedro  1 tablet Oral QAM    bumetanide  4 mg Oral BID AC    escitalopram  20 mg Oral QAM    ferrous sulfate  325 mg Oral QAM    gabapentin  200 mg Oral TID    insulin glargine  20 Units SubCUTAneous Daily    magnesium oxide  400 mg Oral TID    oxybutynin  10 mg Oral QAM    pantoprazole  40 mg Oral BID AC    vitamin D  50,000 Units Oral Weekly    sodium chloride flush  5-40 mL IntraVENous 2 times per day    heparin (porcine)  5,000 Units SubCUTAneous 3 times per day    metroNIDAZOLE  500 mg IntraVENous Q8H    ciprofloxacin  400 mg IntraVENous Q24H     Continuous Infusions:    dextrose      sodium chloride       PRN Meds: glucose, dextrose, glucagon (rDNA), dextrose, cloNIDine, ondansetron, sennosides-docusate sodium, sodium chloride flush, sodium chloride, acetaminophen **OR** acetaminophen, labetalol, hydrALAZINE    Data:     Past Medical History:   has a past medical history of Diabetes mellitus ([facility] [address] [facility] [facility] and Hypertension. Social History:   reports that she has been smoking. She has been smoking about 1.00 pack per day. She has never used smokeless tobacco. She reports current alcohol use of about 6.0 standard drinks of alcohol per week. She reports that she does not use drugs. Family History:   Family History   Problem Relation Age of Onset    No Known Problems Mother     No Known Problems Father        Vitals:  BP (!) 146/77   Pulse 82   Temp 99 °F (37.2 °C)   Resp 18   Ht 5' 3\" (1.6 m)   Wt 140 lb 3.4 oz (63.6 kg)   LMP  (LMP Unknown)   SpO2 97%   BMI 24.84 kg/m²   Temp (24hrs), Av.6 °F (37 °C), Min:98.1 °F (36.7 °C), Max:99.3 °F (37.4 °C)    Recent Labs     21  1109 21  1635 21  0839   POCGLU 106* 113* 205* 116*       I/O (24Hr):     Intake/Output Summary (Last 24 hours) at 12/3/2021 1105  Last data filed at 2021 2256  Gross per 24 hour   Intake 2544.2 ml   Output 1500 ml   Net 1044.2 ml       Labs:  Hematology:  Recent Labs     21  1234 21  0549 21  0526   WBC 11.4* 7.6 5.7   RBC 5.06 4.51 4.47   HGB 14.3 12.8 12.7   HCT 44.6 this interval not displayed. ABG:  Lab Results   Component Value Date    FIO2 NOT REPORTED 12/01/2021     Lab Results   Component Value Date/Time    SPECIAL R HAND 12ML 12/01/2021 07:46 PM     Lab Results   Component Value Date/Time    CULTURE NO GROWTH 2 DAYS 12/01/2021 07:46 PM       Radiology:  CT ABDOMEN PELVIS WO CONTRAST Additional Contrast? None    Result Date: 12/1/2021  1. Nonspecific generalized peritoneal fat infiltration with small volume pelvic free fluid and some traces of fluid along the flanks right greater than left. Possibilities include peritonitis, colitis or other intra-infectious process. Apparent thickening of the descending colon probably due to underdistention and lack of contrast. 2. Punctate calcifications around the renal hilum could be vascular or intrarenal. 3. Cholelithiasis. 4. No evidence for bowel obstruction. XR CHEST PORTABLE    Result Date: 12/1/2021  Interval placement of double lumen right IJ central line terminating in right atrium. No acute process.        Physical Examination:        General appearance:  alert, cooperative and no distress  Mental Status:  oriented to person, place and time and normal affect  Lungs:  clear to auscultation bilaterally, normal effort  Heart:  regular rate and rhythm, no murmur  Abdomen:  soft, nontender, nondistended, normal bowel sounds, no masses, hepatomegaly, splenomegaly  Extremities:  no edema, redness, tenderness in the calves  Skin:  no gross lesions, rashes, induration    Assessment:        Hospital Problems           Last Modified POA    * (Principal) Hypertensive crisis 12/1/2021 Yes    Colitis 12/1/2021 Yes    Epigastric pain 12/1/2021 Yes    Overview Signed 12/1/2021  7:05 PM by ENEIDA Pierce NP     Formatting of this note might be different from the original.  Added automatically from request for surgery [id number]         GERD (gastroesophageal reflux disease) 12/1/2021 Yes    Dyslipidemia, goal LDL below 70 12/1/2021 Yes    Uncontrolled type 2 diabetes mellitus ([facility] [address] 12/1/2021 Yes    Hypertensive emergency 12/1/2021 Yes    Essential hypertension 12/1/2021 Yes    ESRD (end stage renal disease) on dialysis ([facility] [address] 12/1/2021 Yes          Plan:        1. Patient normally goes to dialysis Monday Wednesday Friday, plan for dialysis today, patient may go tomorrow as previously scheduled. Cleared by nephrology for discharge  2. Restart blood pressure medications at noon, if blood pressures improved before this evening can discharge to continue home regimen. Otherwise continue to monitor until tomorrow.     Ashli King DO  12/3/2021  11:05 AM

## 2021-12-03 NOTE — PLAN OF CARE
Nutrition Problem #1: Increased nutrient needs  Intervention: Food and/or Nutrient Delivery: Continue Current Diet  Nutritional Goals: PO intakes to provide >75% of estimated nutritional  needs

## 2021-12-03 NOTE — PROGRESS NOTES
Dialysis Post Treatment Report:     -Access Assessment  WNL     -Ultrafiltration   UF Goal 1300   Prime (-) 500   NS Flush (-) 1100   Other (-/+)    Total UF Removed 0     -Medications / Blood Administration  Medications Given (Y/N) yes   Blood Products (Y/N)      Narrative:    Unable to remove fluid. Pt is below her EDW according to bedscale and outpatient unit. Plan for DC home after tx. Denies any complaints.

## 2021-12-03 NOTE — DISCHARGE INSTR - DIET
Good nutrition is important when healing from an illness, injury, or surgery. Follow any nutrition recommendations given to you during your hospital stay. If you were given an oral nutrition supplement while in the hospital, continue to take this supplement at home. You can take it with meals, in-between meals, and/or before bedtime. These supplements can be purchased at most local grocery stores, pharmacies, and chain [place]-stores. If you have any questions about your diet or nutrition, call the hospital and ask for the dietitian.       Renal-low sodium, carb control diet

## 2021-12-03 NOTE — PROGRESS NOTES
Dialysis Safety Checks:    Patient ID Verified (Y/N) YES     -Hepatitis Test                   Date      Result  Hepatitis B Surface Antigen   21 NEG     Hepatitis B Surface Antibody 21 NEG     Hepatitis B Core Antibody        -Treatment Initiation  Blood Vol Processed Goal (Liters)  Pump Speed x Treatment Hours x 60 Minutes    Target Fluid Removal        * Intra-treatment documented Safety Checks include the followin) Access and face visible at all times. 2) All connections and blood lines are secure with no kinks. 3) NVL alarm engaged. 4) Hemosafe device applied (if applicable). 5) No collapse of Arterial or Venous blood chambers. 6) All blood lines / pump segments in the air detectors. --------------------------------------------------------------------------------    Denies complaints. VSS. Below EDW. Challenge.

## 2021-12-03 NOTE — PROGRESS NOTES
HEMODIALYSIS PRE-TREATMENT NOTE    Patient Identifiers prior to treatment:name, Band and birthdate    Isolation Required:na                     Isolation Type: na       (please document if patient is being managed as a PUI/COVID-19 patient)        Hepatitis status:                           Date Drawn                             Result  Hepatitis B Surface Antigen 11/8/2021 neg        Hepatitis B Surface Antibody 11/08/2021 neg        Hepatitis B Core Antibody 11/08/2021 neg          How was Hepatitis Status verified: chart and labs     Was a copy of the labs you documented provided to facility for the patient's chart: yes    Hemodialysis orders verified: yes    Access Within normal limits ( I.e. s/s of infection,...): yes     Pre-Assessment completed: yws    [facility]dialysis [facility] [address] [place], [place] [address]

## 2021-12-06 LAB
CULTURE: NORMAL
CULTURE: NORMAL
Lab: NORMAL
Lab: NORMAL
SPECIMEN DESCRIPTION: NORMAL
SPECIMEN DESCRIPTION: NORMAL

## 2022-01-31 ENCOUNTER — APPOINTMENT (OUTPATIENT)
Dept: GENERAL RADIOLOGY | Age: 52
DRG: 199 | End: 2022-01-31
Payer: MEDICARE

## 2022-01-31 ENCOUNTER — HOSPITAL ENCOUNTER (INPATIENT)
Age: 52
LOS: 4 days | Discharge: HOME | DRG: 199 | End: 2022-02-04
Attending: EMERGENCY MEDICINE | Admitting: INTERNAL MEDICINE
Payer: MEDICARE

## 2022-01-31 DIAGNOSIS — E11.43 GASTROPARESIS DUE TO DM (HCC): ICD-10-CM

## 2022-01-31 DIAGNOSIS — I10 UNCONTROLLED HYPERTENSION: Primary | ICD-10-CM

## 2022-01-31 DIAGNOSIS — K31.84 GASTROPARESIS DUE TO DM (HCC): ICD-10-CM

## 2022-01-31 PROBLEM — Z72.0 TOBACCO USE: Status: ACTIVE | Noted: 2022-01-31

## 2022-01-31 PROBLEM — N18.6 ESRD NEEDING DIALYSIS (HCC): Status: ACTIVE | Noted: 2022-01-31

## 2022-01-31 PROBLEM — Z99.2 ESRD NEEDING DIALYSIS (HCC): Status: ACTIVE | Noted: 2022-01-31

## 2022-01-31 LAB
ABSOLUTE EOS #: <0.03 K/UL (ref 0–0.44)
ABSOLUTE IMMATURE GRANULOCYTE: 0.03 K/UL (ref 0–0.3)
ABSOLUTE LYMPH #: 0.84 K/UL (ref 1.1–3.7)
ABSOLUTE MONO #: 0.43 K/UL (ref 0.1–1.2)
ANION GAP SERPL CALCULATED.3IONS-SCNC: 20 MMOL/L (ref 9–17)
BASOPHILS # BLD: 0 % (ref 0–2)
BASOPHILS ABSOLUTE: 0.03 K/UL (ref 0–0.2)
BNP INTERPRETATION: ABNORMAL
BUN BLDV-MCNC: 48 MG/DL (ref 6–20)
BUN/CREAT BLD: 11 (ref 9–20)
CALCIUM SERPL-MCNC: 9.9 MG/DL (ref 8.6–10.4)
CHLORIDE BLD-SCNC: 101 MMOL/L (ref 98–107)
CO2: 16 MMOL/L (ref 20–31)
CREAT SERPL-MCNC: 4.2 MG/DL (ref 0.5–0.9)
DIFFERENTIAL TYPE: ABNORMAL
EKG ATRIAL RATE: 103 BPM
EKG P AXIS: 38 DEGREES
EKG P-R INTERVAL: 142 MS
EKG Q-T INTERVAL: 366 MS
EKG QRS DURATION: 70 MS
EKG QTC CALCULATION (BAZETT): 479 MS
EKG R AXIS: -17 DEGREES
EKG T AXIS: 59 DEGREES
EKG VENTRICULAR RATE: 103 BPM
EOSINOPHILS RELATIVE PERCENT: 0 % (ref 1–4)
GFR AFRICAN AMERICAN: 14 ML/MIN
GFR NON-AFRICAN AMERICAN: 11 ML/MIN
GFR SERPL CREATININE-BSD FRML MDRD: ABNORMAL ML/MIN/{1.73_M2}
GFR SERPL CREATININE-BSD FRML MDRD: ABNORMAL ML/MIN/{1.73_M2}
GLUCOSE BLD-MCNC: 297 MG/DL (ref 65–105)
GLUCOSE BLD-MCNC: 405 MG/DL (ref 70–99)
GLUCOSE BLD-MCNC: 79 MG/DL (ref 65–105)
HCT VFR BLD CALC: 46.1 % (ref 36.3–47.1)
HEMOGLOBIN: 15.2 G/DL (ref 11.9–15.1)
IMMATURE GRANULOCYTES: 0 %
LYMPHOCYTES # BLD: 9 % (ref 24–43)
MCH RBC QN AUTO: 27.9 PG (ref 25.2–33.5)
MCHC RBC AUTO-ENTMCNC: 33 G/DL (ref 28.4–34.8)
MCV RBC AUTO: 84.7 FL (ref 82.6–102.9)
MONOCYTES # BLD: 5 % (ref 3–12)
MYOGLOBIN: 154 NG/ML (ref 25–58)
MYOGLOBIN: 182 NG/ML (ref 25–58)
NRBC AUTOMATED: 0 PER 100 WBC
PDW BLD-RTO: 15.3 % (ref 11.8–14.4)
PLATELET # BLD: 215 K/UL (ref 138–453)
PLATELET ESTIMATE: ABNORMAL
PMV BLD AUTO: 10.7 FL (ref 8.1–13.5)
POTASSIUM SERPL-SCNC: 5.1 MMOL/L (ref 3.7–5.3)
PRO-BNP: 4604 PG/ML
RBC # BLD: 5.44 M/UL (ref 3.95–5.11)
RBC # BLD: ABNORMAL 10*6/UL
SARS-COV-2, RAPID: NOT DETECTED
SEG NEUTROPHILS: 86 % (ref 36–65)
SEGMENTED NEUTROPHILS ABSOLUTE COUNT: 8.33 K/UL (ref 1.5–8.1)
SODIUM BLD-SCNC: 137 MMOL/L (ref 135–144)
SPECIMEN DESCRIPTION: NORMAL
TROPONIN INTERP: ABNORMAL
TROPONIN INTERP: ABNORMAL
TROPONIN T: ABNORMAL NG/ML
TROPONIN T: ABNORMAL NG/ML
TROPONIN, HIGH SENSITIVITY: 149 NG/L (ref 0–14)
TROPONIN, HIGH SENSITIVITY: 178 NG/L (ref 0–14)
WBC # BLD: 9.7 K/UL (ref 3.5–11.3)
WBC # BLD: ABNORMAL 10*3/UL

## 2022-01-31 PROCEDURE — 93005 ELECTROCARDIOGRAM TRACING: CPT | Performed by: EMERGENCY MEDICINE

## 2022-01-31 PROCEDURE — 99283 EMERGENCY DEPT VISIT LOW MDM: CPT

## 2022-01-31 PROCEDURE — 6360000002 HC RX W HCPCS: Performed by: EMERGENCY MEDICINE

## 2022-01-31 PROCEDURE — APPSS45 APP SPLIT SHARED TIME 31-45 MINUTES: Performed by: NURSE PRACTITIONER

## 2022-01-31 PROCEDURE — 83874 ASSAY OF MYOGLOBIN: CPT

## 2022-01-31 PROCEDURE — 99222 1ST HOSP IP/OBS MODERATE 55: CPT | Performed by: INTERNAL MEDICINE

## 2022-01-31 PROCEDURE — APPNB30 APP NON BILLABLE TIME 0-30 MINS: Performed by: NURSE PRACTITIONER

## 2022-01-31 PROCEDURE — 2580000003 HC RX 258: Performed by: INTERNAL MEDICINE

## 2022-01-31 PROCEDURE — 6360000002 HC RX W HCPCS: Performed by: NURSE PRACTITIONER

## 2022-01-31 PROCEDURE — 2500000003 HC RX 250 WO HCPCS: Performed by: INTERNAL MEDICINE

## 2022-01-31 PROCEDURE — 93010 ELECTROCARDIOGRAM REPORT: CPT | Performed by: INTERNAL MEDICINE

## 2022-01-31 PROCEDURE — 36415 COLL VENOUS BLD VENIPUNCTURE: CPT

## 2022-01-31 PROCEDURE — 2060000000 HC ICU INTERMEDIATE R&B

## 2022-01-31 PROCEDURE — 85025 COMPLETE CBC W/AUTO DIFF WBC: CPT

## 2022-01-31 PROCEDURE — 6370000000 HC RX 637 (ALT 250 FOR IP): Performed by: NURSE PRACTITIONER

## 2022-01-31 PROCEDURE — 80048 BASIC METABOLIC PNL TOTAL CA: CPT

## 2022-01-31 PROCEDURE — 71045 X-RAY EXAM CHEST 1 VIEW: CPT

## 2022-01-31 PROCEDURE — 83880 ASSAY OF NATRIURETIC PEPTIDE: CPT

## 2022-01-31 PROCEDURE — 87635 SARS-COV-2 COVID-19 AMP PRB: CPT

## 2022-01-31 PROCEDURE — 84484 ASSAY OF TROPONIN QUANT: CPT

## 2022-01-31 PROCEDURE — 82947 ASSAY GLUCOSE BLOOD QUANT: CPT

## 2022-01-31 RX ORDER — NIFEDIPINE 30 MG/1
60 TABLET, EXTENDED RELEASE ORAL 2 TIMES DAILY
Status: DISCONTINUED | OUTPATIENT
Start: 2022-01-31 | End: 2022-02-04 | Stop reason: HOSPADM

## 2022-01-31 RX ORDER — ACETAMINOPHEN 325 MG/1
650 TABLET ORAL EVERY 6 HOURS PRN
Status: DISCONTINUED | OUTPATIENT
Start: 2022-01-31 | End: 2022-02-04 | Stop reason: HOSPADM

## 2022-01-31 RX ORDER — LABETALOL HYDROCHLORIDE 5 MG/ML
20 INJECTION, SOLUTION INTRAVENOUS EVERY 6 HOURS PRN
Status: DISCONTINUED | OUTPATIENT
Start: 2022-01-31 | End: 2022-02-04 | Stop reason: HOSPADM

## 2022-01-31 RX ORDER — BUMETANIDE 1 MG/1
4 TABLET ORAL
Status: DISCONTINUED | OUTPATIENT
Start: 2022-01-31 | End: 2022-02-04 | Stop reason: HOSPADM

## 2022-01-31 RX ORDER — GABAPENTIN 100 MG/1
200 CAPSULE ORAL 3 TIMES DAILY
Status: DISCONTINUED | OUTPATIENT
Start: 2022-01-31 | End: 2022-02-04 | Stop reason: HOSPADM

## 2022-01-31 RX ORDER — SENNA AND DOCUSATE SODIUM 50; 8.6 MG/1; MG/1
2 TABLET, FILM COATED ORAL DAILY PRN
Status: DISCONTINUED | OUTPATIENT
Start: 2022-01-31 | End: 2022-02-04 | Stop reason: HOSPADM

## 2022-01-31 RX ORDER — INSULIN GLARGINE 100 [IU]/ML
20 INJECTION, SOLUTION SUBCUTANEOUS DAILY
Status: DISCONTINUED | OUTPATIENT
Start: 2022-01-31 | End: 2022-02-04 | Stop reason: HOSPADM

## 2022-01-31 RX ORDER — ONDANSETRON 4 MG/1
4 TABLET, ORALLY DISINTEGRATING ORAL EVERY 8 HOURS PRN
Status: DISCONTINUED | OUTPATIENT
Start: 2022-01-31 | End: 2022-02-01

## 2022-01-31 RX ORDER — DEXTROSE MONOHYDRATE 50 MG/ML
100 INJECTION, SOLUTION INTRAVENOUS PRN
Status: DISCONTINUED | OUTPATIENT
Start: 2022-01-31 | End: 2022-02-04 | Stop reason: HOSPADM

## 2022-01-31 RX ORDER — POLYETHYLENE GLYCOL 3350 17 G/17G
17 POWDER, FOR SOLUTION ORAL DAILY PRN
Status: DISCONTINUED | OUTPATIENT
Start: 2022-01-31 | End: 2022-02-04 | Stop reason: HOSPADM

## 2022-01-31 RX ORDER — SODIUM CHLORIDE 0.9 % (FLUSH) 0.9 %
5-40 SYRINGE (ML) INJECTION EVERY 12 HOURS SCHEDULED
Status: DISCONTINUED | OUTPATIENT
Start: 2022-01-31 | End: 2022-02-04 | Stop reason: HOSPADM

## 2022-01-31 RX ORDER — HYDRALAZINE HYDROCHLORIDE 20 MG/ML
20 INJECTION INTRAMUSCULAR; INTRAVENOUS EVERY 6 HOURS PRN
Status: DISCONTINUED | OUTPATIENT
Start: 2022-01-31 | End: 2022-01-31

## 2022-01-31 RX ORDER — HYDRALAZINE HYDROCHLORIDE 50 MG/1
100 TABLET, FILM COATED ORAL EVERY 8 HOURS
Status: DISCONTINUED | OUTPATIENT
Start: 2022-01-31 | End: 2022-02-04 | Stop reason: HOSPADM

## 2022-01-31 RX ORDER — PANTOPRAZOLE SODIUM 40 MG/1
40 TABLET, DELAYED RELEASE ORAL
Status: DISCONTINUED | OUTPATIENT
Start: 2022-01-31 | End: 2022-02-02

## 2022-01-31 RX ORDER — NICOTINE POLACRILEX 4 MG
15 LOZENGE BUCCAL PRN
Status: DISCONTINUED | OUTPATIENT
Start: 2022-01-31 | End: 2022-02-04 | Stop reason: HOSPADM

## 2022-01-31 RX ORDER — ESCITALOPRAM OXALATE 10 MG/1
20 TABLET ORAL EVERY MORNING
Status: DISCONTINUED | OUTPATIENT
Start: 2022-02-01 | End: 2022-02-04 | Stop reason: HOSPADM

## 2022-01-31 RX ORDER — OXYBUTYNIN CHLORIDE 10 MG/1
10 TABLET, EXTENDED RELEASE ORAL EVERY MORNING
Status: DISCONTINUED | OUTPATIENT
Start: 2022-02-01 | End: 2022-02-04 | Stop reason: HOSPADM

## 2022-01-31 RX ORDER — ATORVASTATIN CALCIUM 10 MG/1
10 TABLET, FILM COATED ORAL NIGHTLY
Status: DISCONTINUED | OUTPATIENT
Start: 2022-01-31 | End: 2022-02-04 | Stop reason: HOSPADM

## 2022-01-31 RX ORDER — ONDANSETRON 2 MG/ML
4 INJECTION INTRAMUSCULAR; INTRAVENOUS EVERY 6 HOURS PRN
Status: DISCONTINUED | OUTPATIENT
Start: 2022-01-31 | End: 2022-02-01

## 2022-01-31 RX ORDER — LABETALOL 200 MG/1
600 TABLET, FILM COATED ORAL 3 TIMES DAILY
Status: DISCONTINUED | OUTPATIENT
Start: 2022-01-31 | End: 2022-02-04 | Stop reason: HOSPADM

## 2022-01-31 RX ORDER — ASPIRIN 81 MG/1
81 TABLET ORAL DAILY
Status: DISCONTINUED | OUTPATIENT
Start: 2022-01-31 | End: 2022-02-04 | Stop reason: HOSPADM

## 2022-01-31 RX ORDER — SCOLOPAMINE TRANSDERMAL SYSTEM 1 MG/1
1 PATCH, EXTENDED RELEASE TRANSDERMAL
Status: DISCONTINUED | OUTPATIENT
Start: 2022-01-31 | End: 2022-02-04 | Stop reason: HOSPADM

## 2022-01-31 RX ORDER — CLONIDINE HYDROCHLORIDE 0.1 MG/1
0.1 TABLET ORAL EVERY 6 HOURS PRN
Status: DISCONTINUED | OUTPATIENT
Start: 2022-01-31 | End: 2022-02-04 | Stop reason: HOSPADM

## 2022-01-31 RX ORDER — ACETAMINOPHEN 650 MG/1
650 SUPPOSITORY RECTAL EVERY 6 HOURS PRN
Status: DISCONTINUED | OUTPATIENT
Start: 2022-01-31 | End: 2022-02-04 | Stop reason: HOSPADM

## 2022-01-31 RX ORDER — HYDRALAZINE HYDROCHLORIDE 20 MG/ML
20 INJECTION INTRAMUSCULAR; INTRAVENOUS ONCE
Status: COMPLETED | OUTPATIENT
Start: 2022-01-31 | End: 2022-01-31

## 2022-01-31 RX ORDER — MAGNESIUM SULFATE 1 G/100ML
1000 INJECTION INTRAVENOUS PRN
Status: DISCONTINUED | OUTPATIENT
Start: 2022-01-31 | End: 2022-01-31

## 2022-01-31 RX ORDER — SODIUM CHLORIDE 0.9 % (FLUSH) 0.9 %
10 SYRINGE (ML) INJECTION PRN
Status: DISCONTINUED | OUTPATIENT
Start: 2022-01-31 | End: 2022-02-04 | Stop reason: HOSPADM

## 2022-01-31 RX ORDER — SODIUM CHLORIDE 9 MG/ML
25 INJECTION, SOLUTION INTRAVENOUS PRN
Status: DISCONTINUED | OUTPATIENT
Start: 2022-01-31 | End: 2022-02-04 | Stop reason: HOSPADM

## 2022-01-31 RX ORDER — HEPARIN SODIUM 5000 [USP'U]/ML
5000 INJECTION, SOLUTION INTRAVENOUS; SUBCUTANEOUS EVERY 8 HOURS SCHEDULED
Status: DISCONTINUED | OUTPATIENT
Start: 2022-01-31 | End: 2022-02-04 | Stop reason: HOSPADM

## 2022-01-31 RX ORDER — LORAZEPAM 2 MG/ML
2 INJECTION INTRAMUSCULAR EVERY 6 HOURS PRN
Status: DISCONTINUED | OUTPATIENT
Start: 2022-01-31 | End: 2022-02-04 | Stop reason: HOSPADM

## 2022-01-31 RX ORDER — LABETALOL HYDROCHLORIDE 5 MG/ML
20 INJECTION, SOLUTION INTRAVENOUS ONCE
Status: COMPLETED | OUTPATIENT
Start: 2022-01-31 | End: 2022-01-31

## 2022-01-31 RX ADMIN — HEPARIN SODIUM 5000 UNITS: 5000 INJECTION, SOLUTION INTRAVENOUS; SUBCUTANEOUS at 15:13

## 2022-01-31 RX ADMIN — ONDANSETRON 4 MG: 2 INJECTION INTRAMUSCULAR; INTRAVENOUS at 15:19

## 2022-01-31 RX ADMIN — INSULIN LISPRO 9 UNITS: 100 INJECTION, SOLUTION INTRAVENOUS; SUBCUTANEOUS at 17:03

## 2022-01-31 RX ADMIN — ONDANSETRON 4 MG: 2 INJECTION INTRAMUSCULAR; INTRAVENOUS at 21:37

## 2022-01-31 RX ADMIN — INSULIN GLARGINE 20 UNITS: 100 INJECTION, SOLUTION SUBCUTANEOUS at 17:03

## 2022-01-31 RX ADMIN — HEPARIN SODIUM 5000 UNITS: 5000 INJECTION, SOLUTION INTRAVENOUS; SUBCUTANEOUS at 21:37

## 2022-01-31 RX ADMIN — LABETALOL HYDROCHLORIDE 20 MG: 5 INJECTION INTRAVENOUS at 20:19

## 2022-01-31 RX ADMIN — SODIUM BICARBONATE: 84 INJECTION, SOLUTION INTRAVENOUS at 15:53

## 2022-01-31 RX ADMIN — LORAZEPAM 2 MG: 2 INJECTION INTRAMUSCULAR; INTRAVENOUS at 10:02

## 2022-01-31 RX ADMIN — LABETALOL HYDROCHLORIDE 20 MG: 5 INJECTION INTRAVENOUS at 15:12

## 2022-01-31 RX ADMIN — HYDRALAZINE HYDROCHLORIDE 20 MG: 20 INJECTION INTRAMUSCULAR; INTRAVENOUS at 11:17

## 2022-01-31 ASSESSMENT — ENCOUNTER SYMPTOMS
WHEEZING: 0
PHOTOPHOBIA: 0
COUGH: 0
SINUS PAIN: 0
COLOR CHANGE: 0
EYE DISCHARGE: 0
SINUS PRESSURE: 0
NAUSEA: 1
ABDOMINAL DISTENTION: 1
SHORTNESS OF BREATH: 0
CONSTIPATION: 0
SHORTNESS OF BREATH: 1
DIARRHEA: 0
FACIAL SWELLING: 0
VOMITING: 1
EYE REDNESS: 0
ABDOMINAL PAIN: 1

## 2022-01-31 ASSESSMENT — PAIN DESCRIPTION - ONSET: ONSET: ON-GOING

## 2022-01-31 ASSESSMENT — PAIN DESCRIPTION - FREQUENCY: FREQUENCY: CONTINUOUS

## 2022-01-31 ASSESSMENT — PAIN DESCRIPTION - DESCRIPTORS: DESCRIPTORS: DISCOMFORT;SORE;TENDER

## 2022-01-31 ASSESSMENT — PAIN SCALES - GENERAL
PAINLEVEL_OUTOF10: 0
PAINLEVEL_OUTOF10: 0
PAINLEVEL_OUTOF10: 10
PAINLEVEL_OUTOF10: 0
PAINLEVEL_OUTOF10: 0
PAINLEVEL_OUTOF10: 5

## 2022-01-31 ASSESSMENT — PAIN DESCRIPTION - PAIN TYPE: TYPE: ACUTE PAIN

## 2022-01-31 ASSESSMENT — PAIN DESCRIPTION - LOCATION
LOCATION: ABDOMEN
LOCATION: ABDOMEN

## 2022-01-31 NOTE — H&P
[facility]  Office: [address], DO, Linnette Townsend, DO, Dylan Nielsen, DO, Alanis Ventura, DO, Willa Molina MD, Lakeshia Kay MD, Natalie Huitron MD, Gagandeep Cruz MD, Lexii Marmolejo MD, Tanya Cain MD, Mariah Esparza MD, Teddy Arteaga, DO, Marlene Lopez, DO, Delicia Cosby MD,  Steven Monroe DO, Vertell Moritz, MD, Malini Mead MD, Yaakov Bates MD, Tanesha Shelley MD, Tamir Jones MD, Jacquelin Kaye, [facility], [facility], CNP, Lewis Barrett, CNP, Anival Rush, CNS, Anjelica Appl, CNP, Ofelia Valencia, CNP, Alis Fresh, CNP, Devan Rosario, CNP, Dora Ward, CNP, Liza Becerril PA-C, Damion Dobson, [facility], Jose Campbell, [facility], Domenico Ojeda, CNP, Kristin López, CNP, Dawayne Oppenheim, CNP, Marilyn Peterson, CNP, Brenda Kay, CNP, Bruno Co, CNP         [facility]    HISTORY AND PHYSICAL EXAMINATION            Date:   1/31/2022  Patient name:  Sarai Best  Date of admission:  1/31/2022  7:42 AM  MRN:   [id number]  Account:  [de-identified]  YOB: 1970  PCP:    ENEIDA Reyna CNP  Room:   [place] [address]  Code Status:    Prior    Chief Complaint:     Chief Complaint   Patient presents with    Abdominal Pain     CAME FROM DIALYSIS INCREASE ABD PAIN DID NOT HAVE DIALYSIS TODAY    Emesis     SINCE [address] [place] [address] 1/28       History Obtained From:     patient    History of Present Illness:     Sarai Best is a 49 y.o. Non- / non  female who presents with Abdominal Pain (CAME FROM DIALYSIS INCREASE ABD PAIN DID NOT HAVE DIALYSIS TODAY) and Emesis (SINCE FRIDAY 1/28)   and is admitted to the hospital for the management of Hypertensive crisis. Patient has a known history of chronic abdominal pain with chronic nausea and ESRD requiring dialysis 3 times weekly.   Patient reports that on Saturday she developed increased abdominal discomfort followed by nausea with persistent vomiting starting Sunday morning and persisting through today. Patient was unable to take any of her daily medications and has a personal history of hypertension. Patient was too ill to be dialyzed secondary to hypertensive crisis and intractable vomiting and was therefore sent to the emergency department. In the emergency department patient is found to be hypertensive and her lab work is consistent with ESRD requiring dialysis. Patient has been very agitated and writhing in pain in the bed. At the time my exam the patient is resting comfortably. Patient is asleep and in no obvious signs of distress. Patient is awoken and she suddenly becomes very agitated and expressing severe pain rated at a 10/10 and points to her epigastric region. During my exam patient then begins dry heaving. Physical exam is unremarkable. Patient has reportedly been noncompliant with medical staff in the emergency department as a result they have been unable to secure IV access or complete morning labs without significant effort. Patient was provided with her home medications for her hypertension and will be admitted for dialysis. Past Medical History:     Past Medical History:   Diagnosis Date    Diabetes mellitus ([facility] [address]     Diabetic neuropathy ([facility]     Hypertension         Past Surgical History:     History reviewed. No pertinent surgical history. Medications Prior to Admission:     Prior to Admission medications    Medication Sig Start Date End Date Taking?  Authorizing Provider   vitamin D (ERGOCALCIFEROL) 1.25 MG (11926 UT) CAPS capsule Take 50,000 Units by mouth once a week    Historical Provider, MD ROBERSON Complex-C-Folic Acid (BRITNI-MARGARITO) TABS Take 1 tablet by mouth every morning    Historical Provider, MD   labetalol (NORMODYNE) 300 MG tablet Take 600 mg by mouth 3 times daily Takes 2 tabs (=600mg) TID    Historical Provider, MD   cloNIDine (CATAPRES) 0.1 MG tablet Take 0.1 mg by mouth every 6 hours as needed for High Blood Pressure (SBP>165) Historical Provider, MD   hydrALAZINE (APRESOLINE) 100 MG tablet Take 100 mg by mouth every 8 hours    Historical Provider, MD   NIFEdipine (PROCARDIA XL) 60 MG extended release tablet Take 60 mg by mouth 2 times daily    Historical Provider, MD   bumetanide (BUMEX) 2 MG tablet Take 4 mg by mouth 2 times daily (before meals) Takes 2 tabs (=4mg) BID before meals    Historical Provider, MD   ondansetron (ZOFRAN-ODT) 4 MG disintegrating tablet Take 4 mg by mouth every 8 hours as needed for Nausea or Vomiting    Historical Provider, MD   oxybutynin (DITROPAN-XL) 10 MG extended release tablet Take 10 mg by mouth every morning    Historical Provider, MD   cetirizine (ZYRTEC) 10 MG tablet Take 10 mg by mouth daily    Historical Provider, MD   escitalopram (LEXAPRO) 20 MG tablet Take 20 mg by mouth every morning    Historical Provider, MD   ferrous sulfate (FE TABS 325) 325 (65 Fe) MG EC tablet Take 325 mg by mouth every morning    Historical Provider, MD   magnesium oxide (MAG-OX) 400 MG tablet Take 400 mg by mouth 3 times daily    Historical Provider, MD   sennosides-docusate sodium (SENOKOT-S) 8.6-50 MG tablet Take 2 tablets by mouth daily as needed for Constipation    Historical Provider, MD   atorvastatin (LIPITOR) 10 MG tablet Take 10 mg by mouth nightly     Historical Provider, MD   aspirin 81 MG EC tablet Take 81 mg by mouth daily    Historical Provider, MD   gabapentin (NEURONTIN) 100 MG capsule Take 200 mg by mouth 3 times daily.  Takes 2 caps (=200mg) TID    Historical Provider, MD   pantoprazole (PROTONIX) 40 MG tablet Take 40 mg by mouth 2 times daily (before meals)     Historical Provider, MD   insulin lispro (HUMALOG) 100 UNIT/ML injection vial Inject into the skin 4 times daily (with meals and nightly) Indications: sliding scale    Historical Provider, MD   insulin glargine (LANTUS) 100 UNIT/ML injection vial Inject 20 Units into the skin daily     Historical Provider, MD        Allergies:     Tape Venetus.Lula tape]    Social History:     Tobacco:    reports that she has been smoking. She has been smoking about 1.00 pack per day. She has never used smokeless tobacco.  Alcohol:      reports current alcohol use of about 6.0 standard drinks of alcohol per week. Drug Use:  reports no history of drug use. Family History:     Family History   Problem Relation Age of Onset    No Known Problems Mother     No Known Problems Father        Review of Systems:     Positive and Negative as described in HPI. Review of Systems   Constitutional: Positive for activity change. Negative for fatigue. HENT: Negative for sinus pressure and sinus pain. Eyes: Negative for photophobia and visual disturbance. Respiratory: Negative for shortness of breath and wheezing. Cardiovascular: Negative for chest pain, palpitations and leg swelling. Gastrointestinal: Positive for abdominal distention, abdominal pain, nausea and vomiting. Endocrine: Negative for polyuria. Genitourinary: Negative for flank pain and urgency. Musculoskeletal: Negative for arthralgias and myalgias. Allergic/Immunologic: Negative for environmental allergies and immunocompromised state. Neurological: Negative for dizziness, weakness and numbness. Hematological: Negative for adenopathy. Does not bruise/bleed easily. Psychiatric/Behavioral: Positive for agitation. Negative for confusion. Physical Exam:   BP (!) 253/122   Pulse 107   Temp 97.5 °F (36.4 °C) (Oral)   Resp 22   Ht 5' 3\" (1.6 m)   Wt 140 lb (63.5 kg)   LMP  (LMP Unknown)   SpO2 99%   BMI 24.80 kg/m²   Temp (24hrs), Av.5 °F (36.4 °C), Min:97.5 °F (36.4 °C), Max:97.5 °F (36.4 °C)    No results for input(s): POCGLU in the last 72 hours. No intake or output data in the 24 hours ending 22 1110    Physical Exam  Constitutional:       Appearance: Normal appearance. She is normal weight. HENT:      Head: Normocephalic and atraumatic.       Mouth/Throat:      Mouth: Mucous membranes are dry. Eyes:      Extraocular Movements: Extraocular movements intact. Conjunctiva/sclera: Conjunctivae normal.      Pupils: Pupils are equal, round, and reactive to light. Cardiovascular:      Rate and Rhythm: Normal rate and regular rhythm. Pulses: Normal pulses. Heart sounds: Normal heart sounds. No murmur heard. Pulmonary:      Effort: Pulmonary effort is normal. No respiratory distress. Breath sounds: Normal breath sounds. No wheezing. Abdominal:      General: Abdomen is flat. Bowel sounds are normal.      Palpations: Abdomen is soft. Tenderness: There is abdominal tenderness (Bilateral upper quadrant tenderness on palpation). Musculoskeletal:         General: No swelling, tenderness or deformity. Normal range of motion. Cervical back: Normal range of motion and neck supple. No rigidity. Skin:     General: Skin is warm and dry. Capillary Refill: Capillary refill takes less than 2 seconds. Coloration: Skin is not jaundiced. Neurological:      General: No focal deficit present. Mental Status: She is alert and oriented to person, place, and time. Psychiatric:         Mood and Affect: Mood normal.         Behavior: Behavior normal.         Investigations:      Laboratory Testing:  Recent Results (from the past 24 hour(s))   EKG 12 Lead    Collection Time: 01/31/22  7:43 AM   Result Value Ref Range    Ventricular Rate 103 BPM    Atrial Rate 103 BPM    P-R Interval 142 ms    QRS Duration 70 ms    Q-T Interval 366 ms    QTc Calculation (Bazett) 479 ms    P Axis 38 degrees    R Axis -17 degrees    T Axis 59 degrees   COVID-19, Rapid    Collection Time: 01/31/22  9:10 AM    Specimen: Nasopharyngeal Swab   Result Value Ref Range    Specimen Description . NASOPHARYNGEAL SWAB     SARS-CoV-2, Rapid Not Detected Not Detected   CBC Auto Differential    Collection Time: 01/31/22 10:19 AM   Result Value Ref Range    WBC 9.7 3.5 - 11.3 k/uL    RBC 5.44 (H) 3.95 - 5.11 m/uL    Hemoglobin 15.2 (H) 11.9 - 15.1 g/dL    Hematocrit 46.1 36.3 - 47.1 %    MCV 84.7 82.6 - 102.9 fL    MCH 27.9 25.2 - 33.5 pg    MCHC 33.0 28.4 - 34.8 g/dL    RDW 15.3 (H) 11.8 - 14.4 %    Platelets 363 780 - 122 k/uL    MPV 10.7 8.1 - 13.5 fL    NRBC Automated 0.0 0.0 per 100 WBC    Differential Type NOT REPORTED     Seg Neutrophils 86 (H) 36 - 65 %    Lymphocytes 9 (L) 24 - 43 %    Monocytes 5 3 - 12 %    Eosinophils % 0 (L) 1 - 4 %    Basophils 0 0 - 2 %    Immature Granulocytes 0 0 %    Segs Absolute 8.33 (H) 1.50 - 8.10 k/uL    Absolute Lymph # 0.84 (L) 1.10 - 3.70 k/uL    Absolute Mono # 0.43 0.10 - 1.20 k/uL    Absolute Eos # <0.03 0.00 - 0.44 k/uL    Basophils Absolute 0.03 0.00 - 0.20 k/uL    Absolute Immature Granulocyte 0.03 0.00 - 0.30 k/uL    WBC Morphology NOT REPORTED     RBC Morphology ANISOCYTOSIS PRESENT     Platelet Estimate NOT REPORTED    Basic Metabolic Panel    Collection Time: 01/31/22 10:19 AM   Result Value Ref Range    Glucose 405 (HH) 70 - 99 mg/dL    BUN 48 (H) 6 - 20 mg/dL    CREATININE 4.20 (H) 0.50 - 0.90 mg/dL    Bun/Cre Ratio 11 9 - 20    Calcium 9.9 8.6 - 10.4 mg/dL    Sodium 137 135 - 144 mmol/L    Potassium 5.1 3.7 - 5.3 mmol/L    Chloride 101 98 - 107 mmol/L    CO2 16 (L) 20 - 31 mmol/L    Anion Gap 20 (H) 9 - 17 mmol/L    GFR Non-African American 11 (L) >60 mL/min    GFR  14 (L) >60 mL/min    GFR Comment          GFR Staging NOT REPORTED    TROP/MYOGLOBIN    Collection Time: 01/31/22 10:19 AM   Result Value Ref Range    Troponin, High Sensitivity 149 (HH) 0 - 14 ng/L    Troponin T NOT REPORTED <0.03 ng/mL    Troponin Interp NOT REPORTED     Myoglobin 154 (H) 25 - 58 ng/mL   Brain Natriuretic Peptide    Collection Time: 01/31/22 10:19 AM   Result Value Ref Range    Pro-BNP 4,604 (H) <300 pg/mL    BNP Interpretation NOT REPORTED        Imaging/Diagnostics:  XR CHEST PORTABLE    Result Date: 1/31/2022  No acute findings.        Assessment :      Hospital Problems           Last Modified POA    * (Principal) Hypertensive crisis 1/31/2022 Yes    Colitis 1/31/2022 Yes    Epigastric pain 1/31/2022 Yes    Overview Signed 12/1/2021  7:05 PM by ENEIDA Pratt NP     Formatting of this note might be different from the original.  Added automatically from request for surgery [id number]         GERD (gastroesophageal reflux disease) 1/31/2022 Yes    Dyslipidemia, goal LDL below 70 1/31/2022 Yes    Uncontrolled type 2 diabetes mellitus ([facility] [address] 1/31/2022 Yes    Hypertensive emergency 1/31/2022 Yes    Essential hypertension 1/31/2022 Yes    ESRD needing dialysis ([facility] 1/31/2022 Yes          Plan:     Patient status inpatient in the  Progressive Unit/Step down    1. Hypertensive crisis with a personal history of essential hypertension and noncompliance with home medication regiment  1. Initiated home medication oral regiment now  2. As needed IV metoprolol and hydralazine  2. Personal history of chronic colitis with epigastric discomfort and persistent nausea  1. IM Ativan for agitation and abdominal cramps  2. Scopolamine patch and as needed Zofran  3. ESRD requiring dialysis  1. Nephrology consultation is and anticipate dialysis later today  4. GERD with dyslipidemia  1. PPI and statin per home medication dosage  5. Uncontrolled type 2 diabetes  1. Glycemic control as ordered  2. Education on the need for diet and lifestyle modifications as well as compliance with home medication regiment    Consultations:   IP CONSULT TO HOSPITALIST  IP CONSULT TO NEPHROLOGY    Patient is admitted as inpatient status because of co-morbidities listed above, severity of signs and symptoms as outlined, requirement for current medical therapies and most importantly because of direct risk to patient if care not provided in a hospital setting. Expected length of stay > 48 hours.     ENEIDA Pratt NP  1/31/2022  11:10 AM    Copy sent to Dr. Keiko Loya, ENEIDA - CNP

## 2022-01-31 NOTE — ED NOTES
Pt refused oral medications. Dr Lyric Ghosh notified, new order to follow.       Mathieu Hammer, RN  01/31/22 0187

## 2022-01-31 NOTE — Clinical Note
Patient Class: Inpatient [101]   REQUIRED: Diagnosis: ESRD needing dialysis [facility]) [[id number]]   Estimated Length of Stay: Estimated stay of less than 2 midnights   Admitting Provider: Adelia Iraheta [[id number]]   Telemetry/Cardiac Monitoring Required?: Yes

## 2022-01-31 NOTE — ED NOTES
Pt to the ED with complaints of abdominal pain, nausea, and vomiting. Pt states that on Friday she started having nausea and vomiting. Today she started having sharp abdominal pain when she got to dialysis. She denies any vomiting today but has not been able to keep any meds down since Friday. Pt has dialysis MWF and has not had it today. Her last full treatment was Friday. She has a tunnel cath on the right chest. Pt is hypertensive, placed on full monitor, EKG done.       Alfredo Ford RN  01/31/22 0073

## 2022-01-31 NOTE — PROGRESS NOTES
The magnesium sliding scale has been automatically discontinued per [facility] approved policy because the patient has decreased renal function (CrCl<30 ml/min). The patient's current Mag levels are Not available. Estimated Creatinine Clearance: 14 mL/min (A) (based on SCr of 4.2 mg/dL (H)). For patients with decreased renal function (below 30ml/min) needing potassium/magnesium supplementation, please order individual bolus doses with appropriate monitoring. Please contact the inpatient pharmacy at [phone] with any concerns. Thank you.     Chi Wei [facility]  1/31/2022  3:26 PM

## 2022-02-01 LAB
ANION GAP SERPL CALCULATED.3IONS-SCNC: 12 MMOL/L (ref 9–17)
BUN BLDV-MCNC: 49 MG/DL (ref 6–20)
BUN/CREAT BLD: 11 (ref 9–20)
CALCIUM SERPL-MCNC: 9.2 MG/DL (ref 8.6–10.4)
CHLORIDE BLD-SCNC: 104 MMOL/L (ref 98–107)
CO2: 25 MMOL/L (ref 20–31)
CREAT SERPL-MCNC: 4.63 MG/DL (ref 0.5–0.9)
GFR AFRICAN AMERICAN: 12 ML/MIN
GFR NON-AFRICAN AMERICAN: 10 ML/MIN
GFR SERPL CREATININE-BSD FRML MDRD: ABNORMAL ML/MIN/{1.73_M2}
GFR SERPL CREATININE-BSD FRML MDRD: ABNORMAL ML/MIN/{1.73_M2}
GLUCOSE BLD-MCNC: 102 MG/DL (ref 65–105)
GLUCOSE BLD-MCNC: 106 MG/DL (ref 65–105)
GLUCOSE BLD-MCNC: 124 MG/DL (ref 65–105)
GLUCOSE BLD-MCNC: 127 MG/DL (ref 70–99)
GLUCOSE BLD-MCNC: 149 MG/DL (ref 65–105)
GLUCOSE BLD-MCNC: 71 MG/DL (ref 65–105)
GLUCOSE BLD-MCNC: 90 MG/DL (ref 65–105)
HCT VFR BLD CALC: 42.2 % (ref 36.3–47.1)
HEMOGLOBIN: 13.6 G/DL (ref 11.9–15.1)
INR BLD: 0.9
MCH RBC QN AUTO: 28.3 PG (ref 25.2–33.5)
MCHC RBC AUTO-ENTMCNC: 32.2 G/DL (ref 28.4–34.8)
MCV RBC AUTO: 87.7 FL (ref 82.6–102.9)
NRBC AUTOMATED: 0 PER 100 WBC
PDW BLD-RTO: 15.6 % (ref 11.8–14.4)
PLATELET # BLD: 175 K/UL (ref 138–453)
PMV BLD AUTO: 10.3 FL (ref 8.1–13.5)
POTASSIUM SERPL-SCNC: 3.9 MMOL/L (ref 3.7–5.3)
PROTHROMBIN TIME: 12.5 SEC (ref 11.5–14.2)
RBC # BLD: 4.81 M/UL (ref 3.95–5.11)
SODIUM BLD-SCNC: 141 MMOL/L (ref 135–144)
WBC # BLD: 7.5 K/UL (ref 3.5–11.3)

## 2022-02-01 PROCEDURE — 6360000002 HC RX W HCPCS: Performed by: NURSE PRACTITIONER

## 2022-02-01 PROCEDURE — 2500000003 HC RX 250 WO HCPCS: Performed by: NURSE PRACTITIONER

## 2022-02-01 PROCEDURE — 6370000000 HC RX 637 (ALT 250 FOR IP): Performed by: NURSE PRACTITIONER

## 2022-02-01 PROCEDURE — 82947 ASSAY GLUCOSE BLOOD QUANT: CPT

## 2022-02-01 PROCEDURE — 2580000003 HC RX 258: Performed by: NURSE PRACTITIONER

## 2022-02-01 PROCEDURE — 90935 HEMODIALYSIS ONE EVALUATION: CPT

## 2022-02-01 PROCEDURE — 5A1D70Z PERFORMANCE OF URINARY FILTRATION, INTERMITTENT, LESS THAN 6 HOURS PER DAY: ICD-10-PCS | Performed by: INTERNAL MEDICINE

## 2022-02-01 PROCEDURE — 85610 PROTHROMBIN TIME: CPT

## 2022-02-01 PROCEDURE — 99232 SBSQ HOSP IP/OBS MODERATE 35: CPT | Performed by: NURSE PRACTITIONER

## 2022-02-01 PROCEDURE — 85027 COMPLETE CBC AUTOMATED: CPT

## 2022-02-01 PROCEDURE — 84244 ASSAY OF RENIN: CPT

## 2022-02-01 PROCEDURE — 82384 ASSAY THREE CATECHOLAMINES: CPT

## 2022-02-01 PROCEDURE — 36415 COLL VENOUS BLD VENIPUNCTURE: CPT

## 2022-02-01 PROCEDURE — 2580000003 HC RX 258: Performed by: INTERNAL MEDICINE

## 2022-02-01 PROCEDURE — 80048 BASIC METABOLIC PNL TOTAL CA: CPT

## 2022-02-01 PROCEDURE — 2500000003 HC RX 250 WO HCPCS: Performed by: INTERNAL MEDICINE

## 2022-02-01 PROCEDURE — 2000000000 HC ICU R&B

## 2022-02-01 RX ORDER — PROMETHAZINE HYDROCHLORIDE 12.5 MG/1
12.5 TABLET ORAL EVERY 6 HOURS PRN
Status: DISCONTINUED | OUTPATIENT
Start: 2022-02-01 | End: 2022-02-01

## 2022-02-01 RX ORDER — PROMETHAZINE HYDROCHLORIDE 25 MG/ML
12.5 INJECTION, SOLUTION INTRAMUSCULAR; INTRAVENOUS EVERY 6 HOURS PRN
Status: DISCONTINUED | OUTPATIENT
Start: 2022-02-01 | End: 2022-02-03

## 2022-02-01 RX ORDER — DEXTROSE AND SODIUM CHLORIDE 5; .9 G/100ML; G/100ML
INJECTION, SOLUTION INTRAVENOUS CONTINUOUS
Status: DISCONTINUED | OUTPATIENT
Start: 2022-02-01 | End: 2022-02-04 | Stop reason: HOSPADM

## 2022-02-01 RX ORDER — ONDANSETRON 4 MG/1
4 TABLET, ORALLY DISINTEGRATING ORAL EVERY 8 HOURS PRN
Status: DISCONTINUED | OUTPATIENT
Start: 2022-02-01 | End: 2022-02-02

## 2022-02-01 RX ORDER — METOCLOPRAMIDE HYDROCHLORIDE 5 MG/ML
5 INJECTION INTRAMUSCULAR; INTRAVENOUS EVERY 6 HOURS PRN
Status: DISCONTINUED | OUTPATIENT
Start: 2022-02-01 | End: 2022-02-02

## 2022-02-01 RX ORDER — HYDRALAZINE HYDROCHLORIDE 20 MG/ML
20 INJECTION INTRAMUSCULAR; INTRAVENOUS ONCE
Status: COMPLETED | OUTPATIENT
Start: 2022-02-01 | End: 2022-02-01

## 2022-02-01 RX ORDER — BUMETANIDE 0.25 MG/ML
1 INJECTION, SOLUTION INTRAMUSCULAR; INTRAVENOUS EVERY 12 HOURS
Status: DISCONTINUED | OUTPATIENT
Start: 2022-02-01 | End: 2022-02-02

## 2022-02-01 RX ORDER — ONDANSETRON 2 MG/ML
4 INJECTION INTRAMUSCULAR; INTRAVENOUS EVERY 4 HOURS PRN
Status: DISCONTINUED | OUTPATIENT
Start: 2022-02-01 | End: 2022-02-02

## 2022-02-01 RX ORDER — PROMETHAZINE HYDROCHLORIDE 25 MG/ML
12.5 INJECTION, SOLUTION INTRAMUSCULAR; INTRAVENOUS ONCE
Status: DISCONTINUED | OUTPATIENT
Start: 2022-02-01 | End: 2022-02-01 | Stop reason: SDUPTHER

## 2022-02-01 RX ADMIN — METOCLOPRAMIDE 5 MG: 5 INJECTION, SOLUTION INTRAMUSCULAR; INTRAVENOUS at 17:17

## 2022-02-01 RX ADMIN — HEPARIN SODIUM 5000 UNITS: 5000 INJECTION, SOLUTION INTRAVENOUS; SUBCUTANEOUS at 22:50

## 2022-02-01 RX ADMIN — GABAPENTIN 200 MG: 100 CAPSULE ORAL at 21:15

## 2022-02-01 RX ADMIN — ONDANSETRON 4 MG: 2 INJECTION INTRAMUSCULAR; INTRAVENOUS at 10:30

## 2022-02-01 RX ADMIN — HEPARIN SODIUM 5000 UNITS: 5000 INJECTION, SOLUTION INTRAVENOUS; SUBCUTANEOUS at 07:30

## 2022-02-01 RX ADMIN — ONDANSETRON 4 MG: 2 INJECTION INTRAMUSCULAR; INTRAVENOUS at 05:00

## 2022-02-01 RX ADMIN — INSULIN LISPRO 2 UNITS: 100 INJECTION, SOLUTION INTRAVENOUS; SUBCUTANEOUS at 21:11

## 2022-02-01 RX ADMIN — DEXTROSE MONOHYDRATE 15 MG/HR: 50 INJECTION, SOLUTION INTRAVENOUS at 04:04

## 2022-02-01 RX ADMIN — LABETALOL HYDROCHLORIDE 600 MG: 200 TABLET, FILM COATED ORAL at 21:15

## 2022-02-01 RX ADMIN — LABETALOL HYDROCHLORIDE 600 MG: 200 TABLET, FILM COATED ORAL at 14:04

## 2022-02-01 RX ADMIN — PROMETHAZINE HYDROCHLORIDE 12.5 MG: 25 INJECTION INTRAMUSCULAR; INTRAVENOUS at 09:22

## 2022-02-01 RX ADMIN — PROMETHAZINE HYDROCHLORIDE 12.5 MG: 25 INJECTION INTRAMUSCULAR; INTRAVENOUS at 01:19

## 2022-02-01 RX ADMIN — HYDRALAZINE HYDROCHLORIDE 100 MG: 50 TABLET, FILM COATED ORAL at 00:33

## 2022-02-01 RX ADMIN — Medication 1.8 ML: at 15:52

## 2022-02-01 RX ADMIN — HYDRALAZINE HYDROCHLORIDE 100 MG: 50 TABLET, FILM COATED ORAL at 23:46

## 2022-02-01 RX ADMIN — SODIUM CHLORIDE, PRESERVATIVE FREE 10 ML: 5 INJECTION INTRAVENOUS at 09:47

## 2022-02-01 RX ADMIN — ATORVASTATIN CALCIUM 10 MG: 10 TABLET, FILM COATED ORAL at 21:15

## 2022-02-01 RX ADMIN — CLONIDINE HYDROCHLORIDE 0.1 MG: 0.1 TABLET ORAL at 00:50

## 2022-02-01 RX ADMIN — BUMETANIDE 4 MG: 1 TABLET ORAL at 07:30

## 2022-02-01 RX ADMIN — ONDANSETRON 4 MG: 2 INJECTION INTRAMUSCULAR; INTRAVENOUS at 14:31

## 2022-02-01 RX ADMIN — PROMETHAZINE HYDROCHLORIDE 12.5 MG: 25 INJECTION INTRAMUSCULAR; INTRAVENOUS at 16:05

## 2022-02-01 RX ADMIN — LABETALOL HYDROCHLORIDE 20 MG: 5 INJECTION INTRAVENOUS at 02:05

## 2022-02-01 RX ADMIN — PANTOPRAZOLE SODIUM 40 MG: 40 TABLET, DELAYED RELEASE ORAL at 07:30

## 2022-02-01 RX ADMIN — DEXTROSE AND SODIUM CHLORIDE: 5; 900 INJECTION, SOLUTION INTRAVENOUS at 10:41

## 2022-02-01 RX ADMIN — Medication 1.7 ML: at 15:51

## 2022-02-01 RX ADMIN — PROMETHAZINE HYDROCHLORIDE 12.5 MG: 12.5 TABLET ORAL at 08:34

## 2022-02-01 RX ADMIN — BUMETANIDE 1 MG: 0.25 INJECTION INTRAMUSCULAR; INTRAVENOUS at 21:08

## 2022-02-01 RX ADMIN — HYDRALAZINE HYDROCHLORIDE 20 MG: 20 INJECTION INTRAMUSCULAR; INTRAVENOUS at 03:35

## 2022-02-01 RX ADMIN — METOCLOPRAMIDE 5 MG: 5 INJECTION, SOLUTION INTRAMUSCULAR; INTRAVENOUS at 11:48

## 2022-02-01 ASSESSMENT — PAIN SCALES - GENERAL
PAINLEVEL_OUTOF10: 0

## 2022-02-01 NOTE — PROGRESS NOTES
HEMODIALYSIS POST TREATMENT NOTE    Treatment time ordered: 3 hours   Actual treatment time: 3 hours     UltraFiltration Goal: No fluid removal ordered  UltraFiltration Removed: No fluid removal ordered      Pre Treatment weight: Unable to obtain, floor to weigh  Post Treatment weight: Unable to obtain, floor to weigh  Estimated Dry Weight:    Access used:     Central Venous Catheter: yes         Tunneled or Non-tunneled: Tunneled           Site: Right Subclavian          Access Flow: Fair, a BFR of 350 ml/min was maintained throughout entire duration of treatment. Sign and symptoms of infection: no           Medications or blood products given: N/A    Chronic outpatient schedule: MW    [facility] Summary of response to treatment:  Patient tolerated treatment fairly, patient has been nauseated intermittently all day per primary RN report, patient was intermittently on a Cardene gtt for high BP per primary RN. Patient AOx4, Right Subclavian HD CVC has fair blood flow and was able to maintain a BFR of 350 ml/min. No net fluid removal ordered, patient completed 3 hour treatment time. ACES flowsheet faxed to patient unit/ placed in patient chart: N/A    Post assessment completed: yes    Report given to: Monica Love documented Safety Checks include the followin) Access and face visible at all times. 2) All connections and blood lines are secure with no kinks. 3) NVL alarm engaged. 4) Hemosafe device applied (if applicable). 5) No collapse of Arterial or Venous blood chambers. 6) All blood lines / pump segments in the air detectors.

## 2022-02-01 NOTE — PROGRESS NOTES
Pt unable to take PO medication r/t ongoing nausea/ emesis . NP updated and PRN medications given . Pt /93- cardene gtt restarted.

## 2022-02-01 NOTE — PROGRESS NOTES
[facility]  Office: [address], DO, Abbiemary Anastasia, DO, Carol Black, DO, Zelda Ventura, DO, Mahendra Silver MD, Jeffry Medrano MD, Shahram Montana MD, Natanael Soto MD, Reinaldo Jauregui MD, Carolynn Snellen, MD, Kelvin Bridges MD, Shay Romo, DO, Rachel Simpson, DO, Sammie York MD,  Eduardo Hearn, DO, Bindu Delgado MD, Edison Colorado MD, Sumanth Breen MD, Mandy Julien MD, Dyan Ortiz MD, Buck Mina [facility], [facility], CNP, Liz Wick CNP, Kwan Londono, CNS, Adilene Dang, CNP, Vinay Nathan CNP, Kyree Ricardo, CNP, Governor Jones, CNP, Mauro Alfredo CNP, Carol Camarillo PA-C, Chester Augustine DNP, Reuben Luke DNP, Camilla Aguillon CNP, Darryl Olivier CNP, Shobha Sampson CNP, Radha Lilly, CNP, Raina Simpson CNP, Anthony [facility]    Progress Note    2/1/2022    2:41 PM    Name:   Seymour Ellsworth  MRN:     [id number]     Acct:      [de-identified]   Room:   2033/2033-01   Day:  1  Admit Date:  1/31/2022  7:42 AM    PCP:   ENEIDA Thacker CNP  Code Status:  Full Code    Subjective:     C/C:   Chief Complaint   Patient presents with    Abdominal Pain     CAME FROM DIALYSIS INCREASE ABD PAIN DID NOT HAVE DIALYSIS TODAY    Emesis     SINCE FRIDAY 1/28     Interval History Status: improved. Patient had an eventful night as her hypertensive crisis did not break and her severe nausea became worse. Nephrology was contacted about blood pressure concerns and she was ultimately started on a Cardene drip. At the time my exam patient blood pressure symptoms and the patient's nausea with vomiting have resolved. Unfortunately later in the morning pain returned and blood pressure quickly increased into the patient subsequently required being placed back up on a Cardene drip. Reglan was administered as the patient has now failed Zofran and Phenergan. We anticipate dialysis on nephrology's timeline. We also intend to reinitiate oral antihypertensives if GI symptoms can be controlled. Brief History:     1/31 - Patient has a known history of chronic abdominal pain with chronic nausea and ESRD requiring dialysis 3 times weekly. Patient reports that on Saturday she developed increased abdominal discomfort followed by nausea with persistent vomiting starting Sunday morning and persisting through today. Patient was unable to take any of her daily medications and has a personal history of hypertension. Patient was too ill to be dialyzed secondary to hypertensive crisis and intractable vomiting and was therefore sent to the emergency department. In the emergency department patient is found to be hypertensive and her lab work is consistent with ESRD requiring dialysis. 2/1 - Patient had an eventful night as her hypertensive crisis did not break and her severe nausea became worse. Nephrology was contacted about blood pressure concerns and she was ultimately started on a Cardene drip. At the time my exam patient blood pressure symptoms and the patient's nausea with vomiting have resolved. Unfortunately later in the morning pain returned and blood pressure quickly increased into the patient subsequently required being placed back up on a Cardene drip. Reglan was administered as the patient has now failed Zofran and Phenergan. We anticipate dialysis on nephrology's timeline. We also intend to reinitiate oral antihypertensives if GI symptoms can be controlled. Review of Systems:     Constitutional:  negative for chills, fevers, sweats  Respiratory:  negative for cough, dyspnea on exertion, shortness of breath, wheezing  Cardiovascular:  negative for chest pain, chest pressure/discomfort, lower extremity edema, palpitations  Gastrointestinal:  negative for abdominal pain, constipation, diarrhea, nausea, vomiting  Neurological:  negative for dizziness, headache    Medications: Allergies:     Allergies Allergen Reactions    Tape [employer] Tape]        Current Meds:   Scheduled Meds:    aspirin  81 mg Oral Daily    atorvastatin  10 mg Oral Nightly    bumetanide  4 mg Oral BID AC    escitalopram  20 mg Oral QAM    gabapentin  200 mg Oral TID    hydrALAZINE  100 mg Oral Q8H    insulin glargine  20 Units SubCUTAneous Daily    labetalol  600 mg Oral TID    NIFEdipine  60 mg Oral BID    oxybutynin  10 mg Oral QAM    pantoprazole  40 mg Oral BID AC    scopolamine  1 patch TransDERmal Q72H    sodium chloride flush  5-40 mL IntraVENous 2 times per day    heparin (porcine)  5,000 Units SubCUTAneous 3 times per day    insulin lispro  0-18 Units SubCUTAneous TID WC    insulin lispro  0-9 Units SubCUTAneous Nightly     Continuous Infusions:    niCARdipine Stopped (02/01/22 1330)    dextrose 5 % and 0.9 % NaCl 50 mL/hr at 02/01/22 1041    sodium chloride      dextrose       PRN Meds: promethazine, ondansetron **OR** ondansetron, metoclopramide, anticoagulant sodium citrate, anticoagulant sodium citrate, cloNIDine, sennosides-docusate sodium, sodium chloride flush, sodium chloride, polyethylene glycol, acetaminophen **OR** acetaminophen, LORazepam, glucose, glucagon (rDNA), dextrose, dextrose bolus (hypoglycemia) **OR** dextrose bolus (hypoglycemia), labetalol    Data:     Past Medical History:   has a past medical history of Diabetes mellitus ([facility] [address] Diabetic neuropathy ([facility] [address] and Hypertension. Social History:   reports that she has been smoking. She has been smoking about 1.00 pack per day. She has never used smokeless tobacco. She reports current alcohol use of about 6.0 standard drinks of alcohol per week. She reports that she does not use drugs.      Family History:   Family History   Problem Relation Age of Onset    No Known Problems Mother     No Known Problems Father        Vitals:  BP (!) 182/100   Pulse 108   Temp 98.8 °F (37.1 °C)   Resp 23   Ht 5' 3\" (1.6 m)   Wt 140 lb (63.5 kg)   LMP (LMP Unknown)   SpO2 98%   BMI 24.80 kg/m²   Temp (24hrs), Av.5 °F (36.9 °C), Min:97.9 °F (36.6 °C), Max:98.8 °F (37.1 °C)    Recent Labs     22  0027 22  0316 22  0754 22  1131   POCGLU 71 106* 90 102       I/O (24Hr): Intake/Output Summary (Last 24 hours) at 2022 1441  Last data filed at 2022 0930  Gross per 24 hour   Intake 1325.87 ml   Output 950 ml   Net 375.87 ml       Labs:  Hematology:  Recent Labs     22  1019 22  0518   WBC 9.7 7.5   RBC 5.44* 4.81   HGB 15.2* 13.6   HCT 46.1 42.2   MCV 84.7 87.7   MCH 27.9 28.3   MCHC 33.0 32.2   RDW 15.3* 15.6*    175   MPV 10.7 10.3   INR  --  0.9     Chemistry:  Recent Labs     22  1019 22  1331 22  0518     --  141   K 5.1  --  3.9     --  104   CO2 16*  --  25   GLUCOSE 405*  --  127*   BUN 48*  --  49*   CREATININE 4.20*  --  4.63*   ANIONGAP 20*  --  12   LABGLOM 11*  --  10*   GFRAA 14*  --  12*   CALCIUM 9.9  --  9.2   PROBNP 4,604*  --   --    TROPHS 149* 178*  --    MYOGLOBIN 154* 182*  --      Recent Labs     22  1645 22  1931 22  0027 226 22  0754 22  1131   POCGLU 297* 79 71 106* 90 102     ABG:  Lab Results   Component Value Date    FIO2 NOT REPORTED 2021     Lab Results   Component Value Date/Time    SPECIAL R HAND 12ML 2021 07:46 PM     Lab Results   Component Value Date/Time    CULTURE NO GROWTH 5 DAYS 2021 07:46 PM       Radiology:  XR CHEST PORTABLE    Result Date: 2022  No acute findings.        Physical Examination:        General appearance:  alert, cooperative and no distress  Mental Status:  oriented to person, place and time and normal affect  Lungs:  clear to auscultation bilaterally, normal effort  Heart:  regular rate and rhythm, no murmur  Abdomen:  soft, nontender, nondistended, normal bowel sounds, no masses, hepatomegaly, splenomegaly  Extremities:  no edema, redness, tenderness in the calves  Skin:  no gross lesions, rashes, induration    Assessment:        Hospital Problems           Last Modified POA    * (Principal) Hypertensive crisis 1/31/2022 Yes    Colitis 1/31/2022 Yes    Epigastric pain 1/31/2022 Yes    Overview Signed 12/1/2021  7:05 PM by Geni Brittle, APRN - NP     Formatting of this note might be different from the original.  Added automatically from request for surgery [id number]         GERD (gastroesophageal reflux disease) 1/31/2022 Yes    Dyslipidemia, goal LDL below 70 1/31/2022 Yes    Uncontrolled type 2 diabetes mellitus ([facility] [address] 1/31/2022 Yes    Essential hypertension 1/31/2022 Yes    ESRD needing dialysis ([facility] [address] 1/31/2022 Yes    Tobacco use 1/31/2022 Yes    Troponin level elevated 1/31/2022 Yes                Plan:        1. Hypertensive crisis with a personal history of essential hypertension  1. Reinitiate Cardene drip  2. Restart oral regiment once no longer nauseated  2. ESRD requiring dialysis  1. Dialysis on nephrology timeline  3. Epigastric pain with chronic colitis  1. Gentle IV hydration at nephrology recommendation  2.  Initiate IV Reglan as patient has failed Phenergan and Zofran    Geni Brittle, APRN - NP  2/1/2022  2:41 PM

## 2022-02-01 NOTE — PROGRESS NOTES
Pt's SBP remained greater than 200 despite PRN labetalol. RN spoke with Desiree Serra NP, over the phone at [phone] to review all interventions given through the course of the shift. Desiree Serra NP, placed order for ICU transfer and cardene drip, as well as a one time dose of IV hydralazine. RN called house supervisor at [phone] to notify that pt would need transfer to ICU for cardene drip. At 9902, pt updated on current plan of care to have her transferred to ICU. Pt denies questions or concerns at this time. Pt transferred to CVICU via wheelchair at [phone] with her belongings.

## 2022-02-01 NOTE — PROGRESS NOTES
Occupational Therapy  [address]  Occupational Therapy Not Seen Note    Patient not available for Occupational Therapy due to:    [] Testing:    [] Hemodialysis    [] Cancelled by RN:    []Refusal by Patient:    [] Surgery:     [] Intubation:     [] Pain Medication:    [] Sedation:     [] Spine Precautions :    [] Medical Instability:    [x] Other: cx per RN d/t nausea    Brendalyn Harmony, OTR/L

## 2022-02-01 NOTE — CONSULTS
Nephrology ESRD Consult Note    Reason for Consult:  End stage renal disease, nausea, pain over nonfunctioning left brachial area nonfunctioning AV fistula  Requesting Physician: Hospitalist    Chief Complaint: Nausea  History Obtained From:  patient, electronic medical record    History of Present Illness: This is a 49 y.o. female with end stage renal disease on hemodialysis [id number] who presents with nausea. She last had dialysis on Friday, 1/28/2022 at [address] renal care [facility] under the care of Dr Radha Weaver. She states she had a fistula placed about a month ago at [facility] in the left brachial area and it has not worked. There is no thrill and bruit over the axis. She complains of pain over the access, a bit worse with touch. She apparently is scheduled to go back to see them at some point soon. She came in yesterday with a potassium of 5.1 and a bicarbonate level of 16. Patient came in late in the day and no available dialysis spots were available. I put the patient on bicarbonate containing IV fluids and labs today were improved with potassium down to 3.9 and bicarbonate up to twenty-five. We are stopping the IV fluids. The patient did have low blood sugar this morning and was only able to drink a small amount of apple juice. We will put the patient on D5 0.9 normal saline. She says recently she has not had any significant fluid removal during dialysis. When she came in yesterday she also has significant elevation of blood pressure because of unable to tolerate her oral medications. She was given IV hydralazine then put on as needed IV labetalol. Then she was brought up to the current cardiovascular unit for Cardene drip which currently is off. ESRD is likely secondary diabetes mellitus with history of diabetic neuropathy, and hypertension. She also has history of secondary hyperparathyroidism. Medications are reviewed. Allergies are reviewed.     Past Medical History:        Diagnosis Date    Diabetes mellitus ([facility] [address]     Diabetic neuropathy ([facility] [address]     Hypertension        Access:  previous permacath    Past Surgical History:    History reviewed. No pertinent surgical history.     Current Medications:    niCARdipine (CARDENE) 50 mg in dextrose 5 % 250 mL infusion, Continuous  promethazine (PHENERGAN) injection 12.5 mg, Q6H PRN  dextrose 5 % and 0.9 % sodium chloride infusion, Continuous  aspirin EC tablet 81 mg, Daily  atorvastatin (LIPITOR) tablet 10 mg, Nightly  bumetanide (BUMEX) tablet 4 mg, BID AC  cloNIDine (CATAPRES) tablet 0.1 mg, Q6H PRN  escitalopram (LEXAPRO) tablet 20 mg, QAM  gabapentin (NEURONTIN) capsule 200 mg, TID  hydrALAZINE (APRESOLINE) tablet 100 mg, Q8H  insulin glargine (LANTUS) injection vial 20 Units, Daily  labetalol (NORMODYNE) tablet 600 mg, TID  NIFEdipine (PROCARDIA XL) extended release tablet 60 mg, BID  oxybutynin (DITROPAN-XL) extended release tablet 10 mg, QAM  pantoprazole (PROTONIX) tablet 40 mg, BID AC  sennosides-docusate sodium (SENOKOT-S) 8.6-50 MG tablet 2 tablet, Daily PRN  scopolamine (TRANSDERM-SCOP) transdermal patch 1 patch, Q72H  sodium chloride flush 0.9 % injection 5-40 mL, 2 times per day  sodium chloride flush 0.9 % injection 10 mL, PRN  0.9 % sodium chloride infusion, PRN  ondansetron (ZOFRAN-ODT) disintegrating tablet 4 mg, Q8H PRN   Or  ondansetron (ZOFRAN) injection 4 mg, Q6H PRN  polyethylene glycol (GLYCOLAX) packet 17 g, Daily PRN  acetaminophen (TYLENOL) tablet 650 mg, Q6H PRN   Or  acetaminophen (TYLENOL) suppository 650 mg, Q6H PRN  heparin (porcine) injection 5,000 Units, 3 times per day  LORazepam (ATIVAN) injection 2 mg, Q6H PRN  glucose (GLUTOSE) 40 % oral gel 15 g, PRN  glucagon (rDNA) injection 1 mg, PRN  dextrose 5 % solution, PRN  dextrose bolus (hypoglycemia) 10% 125 mL, PRN   Or  dextrose bolus (hypoglycemia) 10% 250 mL, PRN  labetalol (NORMODYNE;TRANDATE) injection 20 mg, Q6H PRN  insulin lispro (HUMALOG) injection vial 0-18 Units, TID WC  insulin lispro (HUMALOG) injection vial 0-9 Units, Nightly        Allergies:  Tape Andree Linker tape]    Social History:    Social History     Socioeconomic History    Marital status:      Spouse name: Not on file    Number of children: Not on file    Years of education: Not on file    Highest education level: Not on file   Occupational History    Not on file   Tobacco Use    Smoking status: Current Every Day Smoker     Packs/day: 1.00    Smokeless tobacco: Never Used   Vaping Use    Vaping Use: Never used   Substance and Sexual Activity    Alcohol use: Yes     Alcohol/week: 6.0 standard drinks     Types: 6 Cans of beer per week    Drug use: No    Sexual activity: Yes     Partners: Male   Other Topics Concern    Not on file   Social History Narrative    Not on file     Social Determinants of Health     Financial Resource Strain:     Difficulty of Paying Living Expenses: Not on file   Food Insecurity:     Worried About Running Out of Food in the Last Year: Not on file    Oliva of Food in the Last Year: Not on file   Transportation Needs:     Lack of Transportation (Medical): Not on file    Lack of Transportation (Non-Medical):  Not on file   Physical Activity:     Days of Exercise per Week: Not on file    Minutes of Exercise per Session: Not on file   Stress:     Feeling of Stress : Not on file   Social Connections:     Frequency of Communication with Friends and Family: Not on file    Frequency of Social Gatherings with Friends and Family: Not on file    Attends [employer] Services: Not on file    Active Member of Clubs or Organizations: Not on file    Attends Club or Organization Meetings: Not on file    Marital Status: Not on file   Intimate Partner Violence:     Fear of Current or Ex-Partner: Not on file    Emotionally Abused: Not on file    Physically Abused: Not on file    Sexually Abused: Not on file   Housing Stability:  Unable to Pay for Housing in the Last Year: Not on file    Number of Places Lived in the Last Year: Not on file    Unstable Housing in the Last Year: Not on file       Family History:   Family History   Problem Relation Age of Onset    No Known Problems Mother     No Known Problems Father        Review of Systems:    Pertinent positives stated above in HPI. All other systems were reviewed and were negative.     Objective:  Constitutional:    CURRENT TEMPERATURE:  Temp: 97.9 °F (36.6 °C)  MAXIMUM TEMPERATURE OVER 24HRS:  Temp (24hrs), Av.4 °F (36.9 °C), Min:97.9 °F (36.6 °C), Max:98.8 °F (37.1 °C)    CURRENT RESPIRATORY RATE:  Resp: 24  CURRENT PULSE:  Pulse: 107  CURRENT BLOOD PRESSURE:  BP: (!) 153/92  24HR BLOOD PRESSURE RANGE:  Systolic (97XBH), DBO:619 , Min:97 , VZD:670   ; Diastolic (72CRG), :417, Min:60, Max:135    24HR INTAKE/OUTPUT:      Intake/Output Summary (Last 24 hours) at 2022 1002  Last data filed at 2022 0930  Gross per 24 hour   Intake 1325.87 ml   Output 950 ml   Net 375.87 ml         Physical Exam:  alert and oriented to person, place and time, in some distress secondary to the nausea and just received anti- nausea medication  Skin: warm and dry, no rash or erythema  Eyes: conjunctivae normal and sclera anicteric  ENT: Mildly dry mucous membranes  Neck: No JVD, midline trachea, no accessory muscle use  Pulmonary: Good bilateral air entry and clear to auscultation bilaterally without wheezes or rales or rhonchi Cardiovascular: Tachycardic rate, regular rhythm, positive S1 and S2 and no rubs  Abdomen: Soft and not significantly tender or distended with positive bowel sounds Extremities: No peripheral edema, some tenderness over the left brachial area AV fistula with no thrill or bruit  Labs:  PTH:  No results found for: PTH  abs:   CBC:   Recent Labs     22  1019 22  0518   WBC 9.7 7.5   RBC 5.44* 4.81   HGB 15.2* 13.6   HCT 46.1 42.2   MCV 84.7 87.7   MCH 27.9 28.3   MCHC 33.0 32.2   RDW 15.3* 15.6*    175   MPV 10.7 10.3      BMP:   Recent Labs     01/31/22  1019 02/01/22  0518    141   K 5.1 3.9    104   CO2 16* 25   BUN 48* 49*   CREATININE 4.20* 4.63*   GLUCOSE 405* 127*   CALCIUM 9.9 9.2        Phosphorus:  No results for input(s): PHOS in the last 72 hours. Magnesium: No results for input(s): MG in the last 72 hours. Albumin: No results for input(s): LABALBU in the last 72 hours. Assessment:  1. ESRD, on a Monday and Wednesday Friday dialysis schedule with missed dialysis yesterday  2. Essential hypertension with improving control, now off Cardene drip  3. Borderline high potassium now normalized with bicarbonate containing IV fluids  4. Metabolic acidosis with serum bicarbonate now in normal range on bicarbonate containing IV fluids  5. Mild hypoglycemia  6. Persistent nausea of unknown cause  7. Non-functional left brachial  AV fistula      Plan:  1. Dialysis today  2. Antihypertensives per home dosing when patient is able to tolerate  3. We will evaluate tomorrow whether or not to put the patient back on her usual dialysis schedule or delay it to Thursday  4. Follow labs as ordered  5. Discontinue sodium bicarbonate infusion  6. Begin D5 0.9 normal saline at 50 mL an hour for low blood sugars with patient not taking in any significant oral    Thank you for the consultation. Please do not hesitate to call with questions.     Electronically signed by Michael De Jesus MD on 2/1/2022 at 10:02 AM

## 2022-02-01 NOTE — PROGRESS NOTES
Patient transferred to unit at 0400 denies pain or N/V at this time. Writer started cardene drip at 15 mg/hr and noted patient's BP was 160/90. Writer turned off drip around 0600 as BP was 97/60 and HR of 91. Patient resting quietly in bed. IVP zofran was given earlier with effective results.

## 2022-02-01 NOTE — CARE COORDINATION
Case Management Initial Discharge Plan  Gus Eddy,             Met with:patient to discuss discharge plans. Information verified: address, contacts, phone number, , insurance Yes  Insurance Provider: [employer]    Emergency Contact/Next of Kin name & number: spouse/Keo   [phone]  Who are involved in patient's support system? spouse    PCP: Meredith Miramontes, APRN - CNP  Date of last visit: was supposed to see today but admitted. Discharge Planning    Living Arrangements:        Home has 2 stories  4 stairs to climb to get into front door, 1 flight stairs to climb to reach second floor  Location of bedroom/bathroom in home 2nd    Patient able to perform ADL's:Independent    Current Services (outpatient & in home) none  DME equipment: walker  DME provider: 0    Is patient receiving oral anticoagulation therapy? No    If indicated:   Physician managing anticoagulation treatment:   Where does patient obtain lab work for ATC treatment? Potential Assistance Needed:       Patient agreeable to home care: No  [place] of choice provided:  n/a    Prior SNF/Rehab Placement and Facility: n/a  Agreeable to SNF/Rehab: No  [place] of choice provided: n/a     Evaluation: no    Expected Discharge date:       Patient expects to be discharged to: If home: is the family and/or caregiver wiling & able to provide support at home? yes  Who will be providing this support? Spouse and self    Follow Up Appointment: Best Day/ Time:      Transportation provider: spouse   Transportation arrangements needed for discharge: No    Readmission Risk              Risk of Unplanned Readmission:  19             Does patient have a readmission risk score greater than 14?: Yes  If yes, follow-up appointment must be made within 7 days of discharge.      Goals of Care:       Educated patient on transitional options, provided freedom of choice and are agreeable with plan      Discharge Plan: ESRD dialysis  Patient lives with spouse in a 2 story home with 4 steps to enter. Declines any skilled needs. Pharmacy is [facility]  Uses walker as needed. Plan is to return home independently. Spouse to transport. PT/OT to [facility] and [facility] and nephrology consulted.            Electronically signed by Sebas Soares RN on 2/1/22 at 10:33 AM EST

## 2022-02-02 ENCOUNTER — APPOINTMENT (OUTPATIENT)
Dept: GENERAL RADIOLOGY | Age: 52
DRG: 199 | End: 2022-02-02
Payer: MEDICARE

## 2022-02-02 ENCOUNTER — ANESTHESIA EVENT (OUTPATIENT)
Dept: CARDIOVASCULAR ICU | Age: 52
DRG: 199 | End: 2022-02-02
Payer: MEDICARE

## 2022-02-02 ENCOUNTER — ANESTHESIA (OUTPATIENT)
Dept: CARDIOVASCULAR ICU | Age: 52
DRG: 199 | End: 2022-02-02
Payer: MEDICARE

## 2022-02-02 PROBLEM — E11.43 GASTROPARESIS DUE TO DM (HCC): Status: ACTIVE | Noted: 2022-02-02

## 2022-02-02 PROBLEM — K31.84 GASTROPARESIS DUE TO DM (HCC): Status: ACTIVE | Noted: 2022-02-02

## 2022-02-02 LAB
ALBUMIN SERPL-MCNC: 3.2 G/DL (ref 3.5–5.2)
ALBUMIN/GLOBULIN RATIO: ABNORMAL (ref 1–2.5)
ALP BLD-CCNC: 92 U/L (ref 35–104)
ALT SERPL-CCNC: 17 U/L (ref 5–33)
ANION GAP SERPL CALCULATED.3IONS-SCNC: 11 MMOL/L (ref 9–17)
AST SERPL-CCNC: 18 U/L
BILIRUB SERPL-MCNC: 0.17 MG/DL (ref 0.3–1.2)
BILIRUBIN DIRECT: 0.1 MG/DL
BILIRUBIN, INDIRECT: 0.07 MG/DL (ref 0–1)
BUN BLDV-MCNC: 18 MG/DL (ref 6–20)
BUN/CREAT BLD: 5 (ref 9–20)
CALCIUM SERPL-MCNC: 8.7 MG/DL (ref 8.6–10.4)
CHLORIDE BLD-SCNC: 106 MMOL/L (ref 98–107)
CO2: 22 MMOL/L (ref 20–31)
CREAT SERPL-MCNC: 3.35 MG/DL (ref 0.5–0.9)
GFR AFRICAN AMERICAN: 18 ML/MIN
GFR NON-AFRICAN AMERICAN: 15 ML/MIN
GFR SERPL CREATININE-BSD FRML MDRD: ABNORMAL ML/MIN/{1.73_M2}
GFR SERPL CREATININE-BSD FRML MDRD: ABNORMAL ML/MIN/{1.73_M2}
GLOBULIN: ABNORMAL G/DL (ref 1.5–3.8)
GLUCOSE BLD-MCNC: 107 MG/DL (ref 65–105)
GLUCOSE BLD-MCNC: 115 MG/DL (ref 65–105)
GLUCOSE BLD-MCNC: 132 MG/DL (ref 70–99)
GLUCOSE BLD-MCNC: 155 MG/DL (ref 65–105)
GLUCOSE BLD-MCNC: 208 MG/DL (ref 65–105)
MAGNESIUM: 1.9 MG/DL (ref 1.6–2.6)
PHOSPHORUS: 4.2 MG/DL (ref 2.6–4.5)
POTASSIUM SERPL-SCNC: 3.4 MMOL/L (ref 3.7–5.3)
SODIUM BLD-SCNC: 139 MMOL/L (ref 135–144)
TOTAL PROTEIN: 5.8 G/DL (ref 6.4–8.3)

## 2022-02-02 PROCEDURE — 71045 X-RAY EXAM CHEST 1 VIEW: CPT

## 2022-02-02 PROCEDURE — APPNB30 APP NON BILLABLE TIME 0-30 MINS: Performed by: NURSE PRACTITIONER

## 2022-02-02 PROCEDURE — 74018 RADEX ABDOMEN 1 VIEW: CPT

## 2022-02-02 PROCEDURE — 02HV33Z INSERTION OF INFUSION DEVICE INTO SUPERIOR VENA CAVA, PERCUTANEOUS APPROACH: ICD-10-PCS | Performed by: ANESTHESIOLOGY

## 2022-02-02 PROCEDURE — 83735 ASSAY OF MAGNESIUM: CPT

## 2022-02-02 PROCEDURE — 99254 IP/OBS CNSLTJ NEW/EST MOD 60: CPT | Performed by: INTERNAL MEDICINE

## 2022-02-02 PROCEDURE — 2500000003 HC RX 250 WO HCPCS: Performed by: NURSE PRACTITIONER

## 2022-02-02 PROCEDURE — 84100 ASSAY OF PHOSPHORUS: CPT

## 2022-02-02 PROCEDURE — 6360000002 HC RX W HCPCS: Performed by: NURSE PRACTITIONER

## 2022-02-02 PROCEDURE — 99232 SBSQ HOSP IP/OBS MODERATE 35: CPT | Performed by: NURSE PRACTITIONER

## 2022-02-02 PROCEDURE — 6360000002 HC RX W HCPCS

## 2022-02-02 PROCEDURE — 2580000003 HC RX 258: Performed by: NURSE PRACTITIONER

## 2022-02-02 PROCEDURE — 2000000000 HC ICU R&B

## 2022-02-02 PROCEDURE — 80048 BASIC METABOLIC PNL TOTAL CA: CPT

## 2022-02-02 PROCEDURE — 36415 COLL VENOUS BLD VENIPUNCTURE: CPT

## 2022-02-02 PROCEDURE — 2580000003 HC RX 258: Performed by: INTERNAL MEDICINE

## 2022-02-02 PROCEDURE — 6370000000 HC RX 637 (ALT 250 FOR IP): Performed by: NURSE PRACTITIONER

## 2022-02-02 PROCEDURE — 36556 INSERT NON-TUNNEL CV CATH: CPT | Performed by: ANESTHESIOLOGY

## 2022-02-02 PROCEDURE — 82947 ASSAY GLUCOSE BLOOD QUANT: CPT

## 2022-02-02 PROCEDURE — C9113 INJ PANTOPRAZOLE SODIUM, VIA: HCPCS | Performed by: NURSE PRACTITIONER

## 2022-02-02 PROCEDURE — 80076 HEPATIC FUNCTION PANEL: CPT

## 2022-02-02 PROCEDURE — 94761 N-INVAS EAR/PLS OXIMETRY MLT: CPT

## 2022-02-02 RX ORDER — MORPHINE SULFATE 4 MG/ML
INJECTION, SOLUTION INTRAMUSCULAR; INTRAVENOUS
Status: COMPLETED
Start: 2022-02-02 | End: 2022-02-02

## 2022-02-02 RX ORDER — BUMETANIDE 0.25 MG/ML
1 INJECTION, SOLUTION INTRAMUSCULAR; INTRAVENOUS EVERY 12 HOURS
Status: DISCONTINUED | OUTPATIENT
Start: 2022-02-02 | End: 2022-02-02

## 2022-02-02 RX ORDER — MORPHINE SULFATE 4 MG/ML
4 INJECTION, SOLUTION INTRAMUSCULAR; INTRAVENOUS
Status: DISCONTINUED | OUTPATIENT
Start: 2022-02-02 | End: 2022-02-04 | Stop reason: HOSPADM

## 2022-02-02 RX ORDER — MORPHINE SULFATE 2 MG/ML
2 INJECTION, SOLUTION INTRAMUSCULAR; INTRAVENOUS
Status: DISCONTINUED | OUTPATIENT
Start: 2022-02-02 | End: 2022-02-04 | Stop reason: HOSPADM

## 2022-02-02 RX ORDER — POTASSIUM CHLORIDE 20 MEQ/1
40 TABLET, EXTENDED RELEASE ORAL ONCE
Status: COMPLETED | OUTPATIENT
Start: 2022-02-02 | End: 2022-02-02

## 2022-02-02 RX ORDER — METOCLOPRAMIDE HYDROCHLORIDE 5 MG/ML
5 INJECTION INTRAMUSCULAR; INTRAVENOUS 2 TIMES DAILY
Status: DISCONTINUED | OUTPATIENT
Start: 2022-02-02 | End: 2022-02-04 | Stop reason: HOSPADM

## 2022-02-02 RX ORDER — PANTOPRAZOLE SODIUM 40 MG/10ML
40 INJECTION, POWDER, LYOPHILIZED, FOR SOLUTION INTRAVENOUS 2 TIMES DAILY
Status: DISCONTINUED | OUTPATIENT
Start: 2022-02-02 | End: 2022-02-04 | Stop reason: HOSPADM

## 2022-02-02 RX ORDER — ONDANSETRON 2 MG/ML
4 INJECTION INTRAMUSCULAR; INTRAVENOUS
Status: DISCONTINUED | OUTPATIENT
Start: 2022-02-02 | End: 2022-02-04

## 2022-02-02 RX ADMIN — HYDRALAZINE HYDROCHLORIDE 100 MG: 50 TABLET, FILM COATED ORAL at 08:14

## 2022-02-02 RX ADMIN — HEPARIN SODIUM 5000 UNITS: 5000 INJECTION, SOLUTION INTRAVENOUS; SUBCUTANEOUS at 21:40

## 2022-02-02 RX ADMIN — PROMETHAZINE HYDROCHLORIDE 12.5 MG: 25 INJECTION INTRAMUSCULAR; INTRAVENOUS at 10:34

## 2022-02-02 RX ADMIN — DEXTROSE AND SODIUM CHLORIDE: 5; 900 INJECTION, SOLUTION INTRAVENOUS at 07:03

## 2022-02-02 RX ADMIN — INSULIN LISPRO 6 UNITS: 100 INJECTION, SOLUTION INTRAVENOUS; SUBCUTANEOUS at 08:14

## 2022-02-02 RX ADMIN — MORPHINE SULFATE 4 MG: 4 INJECTION, SOLUTION INTRAMUSCULAR; INTRAVENOUS at 14:28

## 2022-02-02 RX ADMIN — METOCLOPRAMIDE 5 MG: 5 INJECTION, SOLUTION INTRAMUSCULAR; INTRAVENOUS at 21:05

## 2022-02-02 RX ADMIN — MORPHINE SULFATE 4 MG: 4 INJECTION, SOLUTION INTRAMUSCULAR; INTRAVENOUS at 10:21

## 2022-02-02 RX ADMIN — ONDANSETRON 4 MG: 2 INJECTION INTRAMUSCULAR; INTRAVENOUS at 19:56

## 2022-02-02 RX ADMIN — DEXTROSE MONOHYDRATE 5 MG/HR: 50 INJECTION, SOLUTION INTRAVENOUS at 09:59

## 2022-02-02 RX ADMIN — SODIUM CHLORIDE, PRESERVATIVE FREE 10 ML: 5 INJECTION INTRAVENOUS at 09:00

## 2022-02-02 RX ADMIN — METOCLOPRAMIDE 5 MG: 5 INJECTION, SOLUTION INTRAMUSCULAR; INTRAVENOUS at 09:14

## 2022-02-02 RX ADMIN — SODIUM CHLORIDE, PRESERVATIVE FREE 10 ML: 5 INJECTION INTRAVENOUS at 21:30

## 2022-02-02 RX ADMIN — GABAPENTIN 200 MG: 100 CAPSULE ORAL at 16:33

## 2022-02-02 RX ADMIN — INSULIN GLARGINE 20 UNITS: 100 INJECTION, SOLUTION SUBCUTANEOUS at 10:04

## 2022-02-02 RX ADMIN — POTASSIUM CHLORIDE 40 MEQ: 20 TABLET, EXTENDED RELEASE ORAL at 06:02

## 2022-02-02 RX ADMIN — ATORVASTATIN CALCIUM 10 MG: 10 TABLET, FILM COATED ORAL at 21:00

## 2022-02-02 RX ADMIN — PANTOPRAZOLE SODIUM 40 MG: 40 TABLET, DELAYED RELEASE ORAL at 06:02

## 2022-02-02 RX ADMIN — HEPARIN SODIUM 5000 UNITS: 5000 INJECTION, SOLUTION INTRAVENOUS; SUBCUTANEOUS at 06:02

## 2022-02-02 RX ADMIN — HEPARIN SODIUM 5000 UNITS: 5000 INJECTION, SOLUTION INTRAVENOUS; SUBCUTANEOUS at 14:29

## 2022-02-02 RX ADMIN — HYDRALAZINE HYDROCHLORIDE 100 MG: 50 TABLET, FILM COATED ORAL at 16:31

## 2022-02-02 RX ADMIN — PANTOPRAZOLE SODIUM 40 MG: 40 INJECTION, POWDER, FOR SOLUTION INTRAVENOUS at 21:04

## 2022-02-02 RX ADMIN — MORPHINE SULFATE 4 MG: 4 INJECTION, SOLUTION INTRAMUSCULAR; INTRAVENOUS at 12:27

## 2022-02-02 RX ADMIN — LABETALOL HYDROCHLORIDE 600 MG: 200 TABLET, FILM COATED ORAL at 21:00

## 2022-02-02 RX ADMIN — GABAPENTIN 200 MG: 100 CAPSULE ORAL at 21:00

## 2022-02-02 RX ADMIN — BUMETANIDE 1 MG: 0.25 INJECTION, SOLUTION INTRAMUSCULAR; INTRAVENOUS at 10:02

## 2022-02-02 RX ADMIN — MORPHINE SULFATE 4 MG: 4 INJECTION, SOLUTION INTRAMUSCULAR; INTRAVENOUS at 17:21

## 2022-02-02 ASSESSMENT — PAIN SCALES - GENERAL
PAINLEVEL_OUTOF10: 8
PAINLEVEL_OUTOF10: 9
PAINLEVEL_OUTOF10: 9
PAINLEVEL_OUTOF10: 8

## 2022-02-02 ASSESSMENT — PAIN DESCRIPTION - PAIN TYPE
TYPE: ACUTE PAIN
TYPE: ACUTE PAIN

## 2022-02-02 ASSESSMENT — PAIN DESCRIPTION - LOCATION
LOCATION: ABDOMEN
LOCATION: ABDOMEN

## 2022-02-02 NOTE — CONSULTS
Gastroenterology Consult Note      Patient: Otha Soulier  : 1970  Acct#:  [de-identified]     Date:  2022    Subjective:       History of Present Illness  Patient is a 49 y.o.  female admitted with ESRD needing dialysis ([facility] [address] [N18.6, Z99.2]  Uncontrolled hypertension [I10] who is seen in consult for nausea and vomiting gastroparesis    71-year-old lady with history of diabetic gastroparesis in addition to end-stage renal disease  Admitted with abdominal pain which she still me that is the same patient was have from the gastroparesis but is just worse  She does have hypertension crisis at this time also which was treated. She does have this diagnosis for years  Always symptomatic but she said now it is worse  Diffuse abdominal pain with nausea and vomiting of food and liquids  Patient has been on Zofran Phenergan and Reglan, she is also on PPI with Protonix. The Reglan was stopped she was taking it 3 times a day for long time and it was stopped because of side effects fear  For scan showed cholelithiasis with thickening in the descending colon with diffuse fat stranding and mild free fluid    No weight loss no fever no chills  Her blood work-up showed creatinine of 3.3 alkaline phosphatase of 175  She said she had gastric emptying study but we could not find it  She had an EGD 1 year ago  Attempted colonoscopy but was poorly prepped  Result done by the [facility] group      Past Medical History:   Diagnosis Date    Diabetes mellitus ([address]     Diabetic neuropathy ([address]     Hypertension       History reviewed. No pertinent surgical history. Past Endoscopic History as above    Admission Meds  No current facility-administered medications on file prior to encounter.      Current Outpatient Medications on File Prior to Encounter   Medication Sig Dispense Refill    labetalol (NORMODYNE) 300 MG tablet Take 600 mg by mouth 3 times daily Takes 2 tabs (=600mg) TID     [place] hydrALAZINE (APRESOLINE) 100 MG tablet Take 100 mg by mouth every 8 hours      NIFEdipine (PROCARDIA XL) 60 MG extended release tablet Take 60 mg by mouth 2 times daily      bumetanide (BUMEX) 2 MG tablet Take 4 mg by mouth 2 times daily (before meals) Takes 2 tabs (=4mg) BID before meals      oxybutynin (DITROPAN-XL) 10 MG extended release tablet Take 10 mg by mouth every morning      escitalopram (LEXAPRO) 20 MG tablet Take 20 mg by mouth every morning      ferrous sulfate (FE TABS 325) 325 (65 Fe) MG EC tablet Take 325 mg by mouth every morning      atorvastatin (LIPITOR) 10 MG tablet Take 10 mg by mouth nightly       aspirin 81 MG EC tablet Take 81 mg by mouth daily      gabapentin (NEURONTIN) 100 MG capsule Take 200 mg by mouth 3 times daily.  Takes 2 caps (=200mg) TID      pantoprazole (PROTONIX) 40 MG tablet Take 40 mg by mouth 2 times daily (before meals)       vitamin D (ERGOCALCIFEROL) 1.25 MG (79938 UT) CAPS capsule Take 50,000 Units by mouth once a week      B Complex-C-Folic Acid (BRITNI-MARGARITO) TABS Take 1 tablet by mouth every morning      cloNIDine (CATAPRES) 0.1 MG tablet Take 0.1 mg by mouth every 6 hours as needed for High Blood Pressure (SBP>165)      ondansetron (ZOFRAN-ODT) 4 MG disintegrating tablet Take 4 mg by mouth every 8 hours as needed for Nausea or Vomiting      cetirizine (ZYRTEC) 10 MG tablet Take 10 mg by mouth daily      magnesium oxide (MAG-OX) 400 MG tablet Take 400 mg by mouth 3 times daily      sennosides-docusate sodium (SENOKOT-S) 8.6-50 MG tablet Take 2 tablets by mouth daily as needed for Constipation      insulin lispro (HUMALOG) 100 UNIT/ML injection vial Inject into the skin 4 times daily (with meals and nightly) Indications: sliding scale      insulin glargine (LANTUS) 100 UNIT/ML injection vial Inject 20 Units into the skin daily          Patient   Does Use ASA, NSAID No  Allergies  Allergies   Allergen Reactions    Tape Garnell  Tape]         Social Social History     Tobacco Use    Smoking status: Current Every Day Smoker     Packs/day: 1.00    Smokeless tobacco: Never Used   Substance Use Topics    Alcohol use: Yes     Alcohol/week: 6.0 standard drinks     Types: 6 Cans of beer per week        PSYCH HISTORY:  Depression No  Anxiety No  Suicide No       Family History   Problem Relation Age of Onset    No Known Problems Mother     No Known Problems Father       No family history of colon cancer, Crohn's disease, or ulcerative colitis. Review of Systems  Constitutional: negative  Eyes: negative  Ears, nose, mouth, throat, and face: negative  Respiratory: negative  Cardiovascular: negative  Gastrointestinal: negative  Genitourinary:negative  Integument/breast: negative  Hematologic/lymphatic: negative  Musculoskeletal:negative  Endocrine: negative           Physical Exam  Blood pressure (!) 167/77, pulse 94, temperature 98.6 °F (37 °C), temperature source Temporal, resp. rate 12, height 5' 3\" (1.6 m), weight 140 lb (63.5 kg), SpO2 100 %.          General Appearance: alert and oriented to person, place and time, well-developed and well-nourished, in no acute distress  Skin: warm and dry, no rash or erythema  Head: normocephalic and atraumatic  Eyes: pupils equal, round, and reactive to light, extraocular eye movements intact, conjunctivae normal  ENT: hearing grossly normal bilaterally  Neck: neck supple and non tender without mass, no thyromegaly or thyroid nodules, no cervical lymphadenopathy   Pulmonary/Chest: clear to auscultation bilaterally- no wheezes, rales or rhonchi, normal air movement, no respiratory distress  Cardiovascular: normal rate, regular rhythm, normal S1 and S2, no murmurs, rubs, clicks or gallops, distal pulses intact, no carotid bruits  Abdomen: soft, non-tender, non-distended, normal bowel sounds, no masses or organomegaly  Extremities: no cyanosis, clubbing or edema  Musculoskeletal: normal range of motion, no joint swelling, deformity or tenderness  Neurologic: no cranial nerve deficit and muscle strength normal    Data Review:    Recent Labs     01/31/22  1019 02/01/22  0518   WBC 9.7 7.5   HGB 15.2* 13.6   HCT 46.1 42.2   MCV 84.7 87.7    175     Recent Labs     01/31/22  1019 02/01/22  0518 02/02/22  0351    141 139   K 5.1 3.9 3.4*    104 106   CO2 16* 25 22   PHOS  --   --  4.2   BUN 48* 49* 18   CREATININE 4.20* 4.63* 3.35*     Recent Labs     02/02/22  0351   AST 18   ALT 17   BILIDIR 0.10   BILITOT 0.17*   ALKPHOS 92     No results for input(s): LIPASE, AMYLASE in the last 72 hours. Recent Labs     02/01/22 0518   PROTIME 12.5   INR 0.9     No results for input(s): PTT in the last 72 hours. No results for input(s): OCCULTBLD in the last 72 hours. CEA:  No results found for: CEA  Ca 125:  No results found for:   Ca 19-9:  No results found for:   Ca 15-3:  No results found for:   AFP:  No components found for: AFAFP  Beta HCG:  No components found for: BHCG  Neuron Specific Enolase:  No results found for: NSE  Imaging Studies:                           All appropriate imaging studies and reports reviewed: Yes                 Assessment:     Principal Problem:    Hypertensive crisis  Active Problems:    Colitis    Epigastric pain    GERD (gastroesophageal reflux disease)    Dyslipidemia, goal LDL below 70    Uncontrolled type 2 diabetes mellitus ([address]    Essential hypertension    ESRD needing dialysis ([facility] [address]    Tobacco use    Troponin level elevated    Gastroparesis due to DM ([address]  Resolved Problems:    * No resolved hospital problems.  *    History of gastroparesis  Abdominal pain nausea and vomiting  History of Crohn's colitis  CAT scan showing abnormality in the colon also  Elevated alkaline phosphatase  Hypertension crisis  Marijuana use  End-stage renal disease on dialysis    Recommendations:     Chronic issues  We will get an LFTs and if they are high we might start work-up for that  KUB  Continue Reglan and Zofran and PPI  We might consider erythromycin  Consult about marijuana use  Nephrology following  She will need a colonoscopy as an outpatient                                    Thank you for allowing me to participate in the care of your patient. Please feel free to contact me with any questions or concerns.      Gerri Rivera MD

## 2022-02-02 NOTE — PROGRESS NOTES
Occupational Therapy  DATE: 2022    NAME: Pancho Schofield  MRN: [id number]   : 1970    Patient not seen this date for Occupational Therapy due to:      [] Cancel by RN or physician due to:    [] Hemodialysis    [] Critical Lab Value Level     [] Blood transfusion in progress    [] Acute or unstable cardiovascular status   _MAP < 55 or more than >115  _HR < 40 or > 130    [] Acute or unstable pulmonary status   -FiO2 > 60%   _RR < 5 or >40    _O2 sats < 85%    [] Strict Bedrest    [] Off Unit for surgery or procedure    [] Off Unit for testing       [] Pending imaging to R/O fracture    [] Refusal by Patient      [x] Other-/ cx eval as pt and RN states she is I and has no therapy needs; DC OT order      [] PT being discontinued at this time. Patient independent. No further needs. [] PT being discontinued at this time as the patient has been transferred to hospice care. No further needs.       Kimi Pickering, OT

## 2022-02-02 NOTE — PROGRESS NOTES
Nephrology Progress Note        Subjective: The patient is seen and examined. Patient underwent hemodialysis yesterday. She continues to have nausea and vomiting. She has evidence of diabetic gastroparesis. She is on Reglan. She continues on nicardipine drip because of inability to tolerate oral antihypertensive medications. Her blood sugar was 2 8 this morning on D5 0.9 normal saline at 50 mL an hour. She has a clotted left brachial AV fistula. Labs from today show sodium 139 with potassium 3.4 chloride 106 CO2 22 BUN 18 creatinine 3.35 calcium 8.7 and phosphorus 4.2.     Objective:  CURRENT TEMPERATURE:  Temp: 98.7 °F (37.1 °C)  MAXIMUM TEMPERATURE OVER 24HRS:  Temp (24hrs), Av.4 °F (36.9 °C), Min:97.7 °F (36.5 °C), Max:98.8 °F (37.1 °C)    CURRENT RESPIRATORY RATE:  Resp: 22  CURRENT PULSE:  Pulse: 84  CURRENT BLOOD PRESSURE:  BP: 133/71  24HR BLOOD PRESSURE RANGE:  Systolic (09CTI), LCT:772 , Min:95 , CHERRY:005   ; Diastolic (82DBI), LBC:92, Min:65, Max:114    24HR INTAKE/OUTPUT:    Intake/Output Summary (Last 24 hours) at 2022 1031  Last data filed at 2022 2317  Gross per 24 hour   Intake 600 ml   Output 400 ml   Net 200 ml     Weight:      Physical Exam:  General appearance:  Awake, alert, continues nauseated with abdominal discomfort as well the setting of gastroparesis   Skin: warm and dry, no rash or erythema  Eyes: conjunctivae normal and sclera anicteric  ENT:no thrush, moist mucous membranes  Neck: No JVD, midline trachea, no accessory muscle use   Pulmonary: Good bilateral air entry and clear to auscultation bilaterally without wheezes or rales or rhonchi  Cardiovascular: Regular rate and rhythm positive S1 and S2 and no rubs  Abdomen: Soft and not tender and not significantly distended with positive bowel sounds   Extremities: No pitting peripheral edema and no thrill or bruit over the left brachial AV fistula    Access:  previous tunneled dialysis catheter    Current Medications: morphine (PF) injection 2 mg, Q2H PRN   Or  morphine sulfate (PF) injection 4 mg, Q2H PRN  niCARdipine (CARDENE) 50 mg in dextrose 5 % 250 mL infusion, Continuous  promethazine (PHENERGAN) injection 12.5 mg, Q6H PRN  dextrose 5 % and 0.9 % sodium chloride infusion, Continuous  ondansetron (ZOFRAN) injection 4 mg, Q4H PRN   Or  ondansetron (ZOFRAN-ODT) disintegrating tablet 4 mg, Q8H PRN  metoclopramide (REGLAN) injection 5 mg, Q6H PRN  anticoagulant sodium citrate 4 % injection 1.7 mL, PRN  anticoagulant sodium citrate 4 % injection 1.8 mL, PRN  aspirin EC tablet 81 mg, Daily  atorvastatin (LIPITOR) tablet 10 mg, Nightly  [Held by provider] bumetanide (BUMEX) tablet 4 mg, BID AC  cloNIDine (CATAPRES) tablet 0.1 mg, Q6H PRN  escitalopram (LEXAPRO) tablet 20 mg, QAM  gabapentin (NEURONTIN) capsule 200 mg, TID  hydrALAZINE (APRESOLINE) tablet 100 mg, Q8H  insulin glargine (LANTUS) injection vial 20 Units, Daily  labetalol (NORMODYNE) tablet 600 mg, TID  NIFEdipine (PROCARDIA XL) extended release tablet 60 mg, BID  oxybutynin (DITROPAN-XL) extended release tablet 10 mg, QAM  pantoprazole (PROTONIX) tablet 40 mg, BID AC  sennosides-docusate sodium (SENOKOT-S) 8.6-50 MG tablet 2 tablet, Daily PRN  scopolamine (TRANSDERM-SCOP) transdermal patch 1 patch, Q72H  sodium chloride flush 0.9 % injection 5-40 mL, 2 times per day  sodium chloride flush 0.9 % injection 10 mL, PRN  0.9 % sodium chloride infusion, PRN  polyethylene glycol (GLYCOLAX) packet 17 g, Daily PRN  acetaminophen (TYLENOL) tablet 650 mg, Q6H PRN   Or  acetaminophen (TYLENOL) suppository 650 mg, Q6H PRN  heparin (porcine) injection 5,000 Units, 3 times per day  LORazepam (ATIVAN) injection 2 mg, Q6H PRN  glucose (GLUTOSE) 40 % oral gel 15 g, PRN  glucagon (rDNA) injection 1 mg, PRN  dextrose 5 % solution, PRN  dextrose bolus (hypoglycemia) 10% 125 mL, PRN   Or  dextrose bolus (hypoglycemia) 10% 250 mL, PRN  labetalol (NORMODYNE;TRANDATE) injection 20 mg, Q6H PRN  insulin lispro (HUMALOG) injection vial 0-18 Units, TID WC  insulin lispro (HUMALOG) injection vial 0-9 Units, Nightly      Labs:   CBC:   Recent Labs     01/31/22  1019 02/01/22  0518   WBC 9.7 7.5   RBC 5.44* 4.81   HGB 15.2* 13.6   HCT 46.1 42.2   MCV 84.7 87.7   MCH 27.9 28.3   MCHC 33.0 32.2   RDW 15.3* 15.6*    175   MPV 10.7 10.3      BMP:   Recent Labs     01/31/22  1019 02/01/22  0518 02/02/22  0351    141 139   K 5.1 3.9 3.4*    104 106   CO2 16* 25 22   BUN 48* 49* 18   CREATININE 4.20* 4.63* 3.35*   GLUCOSE 405* 127* 132*   CALCIUM 9.9 9.2 8.7        Phosphorus:    Recent Labs     02/02/22  0351   PHOS 4.2     Magnesium:   Recent Labs     02/02/22  0351   MG 1.9     Albumin: No results for input(s): LABALBU in the last 72 hours. Dialysis bath: Dialysis Bath  K+ (Potassium): 3  Ca+ (Calcium): 2.5  Na+ (Sodium): 138  HCO3 (Bicarb): 30    Radiology:  Reviewed as available. Assessment:  1. ESRD with dialysis scheduled Tuesday and Thursday and Saturday  2. Diabetic gastroparesis  3. Diabetes mellitus with blood sugar 208 today on D5 0.9 normal saline at 50 mL an hour  4. Uncontrolled hypertension with patient unable to tolerate oral medications and so is on a nicardipine drip  5. No fluid overload       Plan:  1. Decrease IV fluid rate with D5 0.9 normal saline to 30 mL an hour in light of the higher blood sugars   2. Continue nicardipine drip  3. Hemodialysis tomorrow  4. Follow labs      Please do not hesitate to call with questions.     Electronically signed by Silvina Rodriguez MD on 2/2/2022 at 10:31 AM

## 2022-02-02 NOTE — PROGRESS NOTES
Physical Therapy  DATE: 2022    NAME: Fran Sutton  MRN: [id number]   : 1970    Patient not seen this date for Physical Therapy due to:      [] Cancel by RN or physician due to:    [] Hemodialysis    [] Critical Lab Value Level     [] Blood transfusion in progress    [] Acute or unstable cardiovascular status   _MAP < 55 or more than >115  _HR < 40 or > 130    [] Acute or unstable pulmonary status   -FiO2 > 60%   _RR < 5 or >40    _O2 sats < 85%    [] Strict Bedrest    [] Off Unit for surgery or procedure    [] Off Unit for testing       [] Pending imaging to R/O fracture    [] Refusal by Patient      [] Other      [x] PT being discontinued at this time. Patient independent. No further needs. Per LORRAINE Dumont and OT staff, pt independent w/o skilled needs at this time. Please re-order skilled PT if functional mobility status changes. [] PT being discontinued at this time as the patient has been transferred to hospice care. No further needs.       Christine Wynn, PT

## 2022-02-02 NOTE — ANESTHESIA PROCEDURE NOTES
Central Venous Line:    A central venous line was placed using surface landmarks, in the ICU for the following indication(s):   2/2/2022 5:44 PM2/2/2022 5:44 PM    Sterility preparation included the following: hand hygiene performed prior to procedure, maximum sterile barriers used and sterile technique used to drape from head to toe. The patient was placed in Trendelenburg position. The left subclavian vein was prepped. The site was prepped with Chloraprep. triple lumen was placed. During the procedure, the following specific steps were taken: target vein identified, needle advanced into vein and blood aspirated and guidewire advanced into vein. Post insertion care included: all ports aspirated, all ports flushed easily, guidewire removed intact, Biopatch applied, line sutured in place and dressing applied. During the procedure the patient experienced: patient tolerated procedure well with no complications.       Anesthesia type: local..No  Staffing  Anesthesiologist: Vanna Olivier MD  Preanesthetic Checklist  Completed: patient identified, IV checked, site marked, risks and benefits discussed, surgical consent, monitors and equipment checked, pre-op evaluation, timeout performed, anesthesia consent given, oxygen available and patient being monitored

## 2022-02-02 NOTE — PROGRESS NOTES
[facility]  Office: [address], DO, Adelia Prince, DO, Roxane Carpio, DO, Destin Simmons Blood, DO, Brennon Luciano MD, Eloisa Flores MD, Jessie Polo MD, Mayco Talavera MD, Alena Blunt MD, Queen Nitish MD, Alphonso Klinefelter, MD, Red Sutton, DO, Brandee Harrison, DO, Alirio Blunt MD,  Danika Dejesus, DO, Brent Haji MD, Lynne Hutchins MD, Cassia Aleman MD, Emily Lares MD, Brandon Dejesus MD, Angelia Kay, [facility], [facility], CNP, Dany Ward, CNP, Ren Hamilton, CNS, Alexi Pollock, CNP, David Morataya, CNP, Joan Renee, CNP, Sulma Sutton, CNP, Broderick Martines, CNP, Griffin Crawley PA-C, Laya Haley, [facility], Michael Guerra, [facility], Kat Garcia, CNP, Tae Luna, CNP, Oscar Timmons, CNP, Venice Amado, CNP, Vanna Han, CNP, Deanna Lao, [facility]    Progress Note    2/2/2022    1:01 PM    Name:   Fran Sutton  MRN:     [id number]     Acct:      [de-identified]   Room:   2033/2033-01  IP Day:  2  Admit Date:  1/31/2022  7:42 AM    PCP:   ENEIDA Rasmussen CNP  Code Status:  Full Code    Subjective:     C/C:   Chief Complaint   Patient presents with    Abdominal Pain     CAME FROM DIALYSIS INCREASE ABD PAIN DID NOT HAVE DIALYSIS TODAY    Emesis     SINCE FRIDAY 1/28     Interval History Status: improved. Patient's GI symptoms have failed to improve despite hydration, Zofran, Phenergan, Reglan. We will consult GI. Patient remains on Cardene drip as she has been unable to take any oral antihypertensives. Case discussed with nephrology and until her gastroparesis and intractable nausea with vomiting resolves patient will need to remain in the hospital to receive IV antihypertensives as well as dialysis. Next dialysis scheduled for tomorrow. We will consult GI as patient has failed all medical efforts at this point to manage her intractable nausea/vomiting.     Brief History:     1/31 - Patient has a known history of chronic abdominal pain with chronic nausea and ESRD requiring dialysis 3 times weekly. Patient reports that on Saturday she developed increased abdominal discomfort followed by nausea with persistent vomiting starting Sunday morning and persisting through today. Patient was unable to take any of her daily medications and has a personal history of hypertension. Patient was too ill to be dialyzed secondary to hypertensive crisis and intractable vomiting and was therefore sent to the emergency department. In the emergency department patient is found to be hypertensive and her lab work is consistent with ESRD requiring dialysis. 2/1 - Patient had an eventful night as her hypertensive crisis did not break and her severe nausea became worse. Nephrology was contacted about blood pressure concerns and she was ultimately started on a Cardene drip. At the time my exam patient blood pressure symptoms and the patient's nausea with vomiting have resolved. Unfortunately later in the morning pain returned and blood pressure quickly increased into the patient subsequently required being placed back up on a Cardene drip. Reglan was administered as the patient has now failed Zofran and Phenergan. We anticipate dialysis on nephrology's timeline. We also intend to reinitiate oral antihypertensives if GI symptoms can be controlled. 2/2 -patient's GI symptoms have failed to improve despite hydration, Zofran, Phenergan, Reglan. We will consult GI. Patient remains on Cardene drip as she has been unable to take any oral antihypertensives.     Review of Systems:     Constitutional:  negative for chills, fevers, sweats  Respiratory:  negative for cough, dyspnea on exertion, shortness of breath, wheezing  Cardiovascular:  negative for chest pain, chest pressure/discomfort, lower extremity edema, palpitations  Gastrointestinal:  negative for abdominal pain, constipation, diarrhea, nausea, vomiting  Neurological: negative for dizziness, headache    Medications: Allergies: Allergies   Allergen Reactions    Tape Gemma Mon Tape]        Current Meds:   Scheduled Meds:    aspirin  81 mg Oral Daily    atorvastatin  10 mg Oral Nightly    [Held by provider] bumetanide  4 mg Oral BID AC    escitalopram  20 mg Oral QAM    gabapentin  200 mg Oral TID    hydrALAZINE  100 mg Oral Q8H    insulin glargine  20 Units SubCUTAneous Daily    labetalol  600 mg Oral TID    NIFEdipine  60 mg Oral BID    oxybutynin  10 mg Oral QAM    pantoprazole  40 mg Oral BID AC    scopolamine  1 patch TransDERmal Q72H    sodium chloride flush  5-40 mL IntraVENous 2 times per day    heparin (porcine)  5,000 Units SubCUTAneous 3 times per day    insulin lispro  0-18 Units SubCUTAneous TID WC    insulin lispro  0-9 Units SubCUTAneous Nightly     Continuous Infusions:    niCARdipine 2.5 mg/hr (02/02/22 1231)    dextrose 5 % and 0.9 % NaCl 30 mL/hr at 02/02/22 1135    sodium chloride      dextrose       PRN Meds: morphine **OR** morphine, promethazine, ondansetron **OR** ondansetron, metoclopramide, anticoagulant sodium citrate, anticoagulant sodium citrate, cloNIDine, sennosides-docusate sodium, sodium chloride flush, sodium chloride, polyethylene glycol, acetaminophen **OR** acetaminophen, LORazepam, glucose, glucagon (rDNA), dextrose, dextrose bolus (hypoglycemia) **OR** dextrose bolus (hypoglycemia), labetalol    Data:     Past Medical History:   has a past medical history of Diabetes mellitus ([facility] [address] Diabetic neuropathy ([facility] [address] and Hypertension. Social History:   reports that she has been smoking. She has been smoking about 1.00 pack per day. She has never used smokeless tobacco. She reports current alcohol use of about 6.0 standard drinks of alcohol per week. She reports that she does not use drugs.      Family History:   Family History   Problem Relation Age of Onset    No Known Problems Mother     No Known Problems Father Vitals:  BP (!) 144/76   Pulse 98   Temp 98.6 °F (37 °C) (Temporal)   Resp 17   Ht 5' 3\" (1.6 m)   Wt 140 lb (63.5 kg)   LMP  (LMP Unknown)   SpO2 100%   BMI 24.80 kg/m²   Temp (24hrs), Av.4 °F (36.9 °C), Min:97.7 °F (36.5 °C), Max:98.7 °F (37.1 °C)    Recent Labs     22  1640 22  1956 22  0738 22  1130   POCGLU 124* 149* 208* 155*       I/O (24Hr): Intake/Output Summary (Last 24 hours) at 2022 1301  Last data filed at 2022 2317  Gross per 24 hour   Intake 600 ml   Output 400 ml   Net 200 ml       Labs:  Hematology:  Recent Labs     22  1019 22  0518   WBC 9.7 7.5   RBC 5.44* 4.81   HGB 15.2* 13.6   HCT 46.1 42.2   MCV 84.7 87.7   MCH 27.9 28.3   MCHC 33.0 32.2   RDW 15.3* 15.6*    175   MPV 10.7 10.3   INR  --  0.9     Chemistry:  Recent Labs     22  1019 22  1331 22  0518 22  0351     --  141 139   K 5.1  --  3.9 3.4*     --  104 106   CO2 16*  --  25 22   GLUCOSE 405*  --  127* 132*   BUN 48*  --  49* 18   CREATININE 4.20*  --  4.63* 3.35*   MG  --   --   --  1.9   ANIONGAP 20*  --  12 11   LABGLOM 11*  --  10* 15*   GFRAA 14*  --  12* 18*   CALCIUM 9.9  --  9.2 8.7   PHOS  --   --   --  4.2   PROBNP 4,604*  --   --   --    TROPHS 149* 178*  --   --    MYOGLOBIN 154* 182*  --   --      Recent Labs     22  0754 22  1131 22  1640 22  1956 22  0738 22  1130   POCGLU 90 102 124* 149* 208* 155*     ABG:  Lab Results   Component Value Date    FIO2 NOT REPORTED 2021     Lab Results   Component Value Date/Time    SPECIAL R HAND 12ML 2021 07:46 PM     Lab Results   Component Value Date/Time    CULTURE NO GROWTH 5 DAYS 2021 07:46 PM       Radiology:  XR CHEST PORTABLE    Result Date: 2022  No acute findings.        Physical Examination:        General appearance:  alert, cooperative and no distress  Mental Status:  oriented to person, place and time and normal affect  Lungs:  clear to auscultation bilaterally, normal effort  Heart:  regular rate and rhythm, no murmur  Abdomen:  soft, nontender, nondistended, normal bowel sounds, no masses, hepatomegaly, splenomegaly  Extremities:  no edema, redness, tenderness in the calves  Skin:  no gross lesions, rashes, induration    Assessment:        Hospital Problems           Last Modified POA    * (Principal) Hypertensive crisis 1/31/2022 Yes    Colitis 1/31/2022 Yes    Epigastric pain 1/31/2022 Yes    Overview Signed 12/1/2021  7:05 PM by ENEIDA Jordan NP     Formatting of this note might be different from the original.  Added automatically from request for surgery [id number]         GERD (gastroesophageal reflux disease) 1/31/2022 Yes    Dyslipidemia, goal LDL below 70 1/31/2022 Yes    Uncontrolled type 2 diabetes mellitus ([facility] [address] 1/31/2022 Yes    Essential hypertension 1/31/2022 Yes    ESRD needing dialysis ([facility] [address] 1/31/2022 Yes    Tobacco use 1/31/2022 Yes    Troponin level elevated 1/31/2022 Yes    Gastroparesis due to DM ([facility] [address] 2/2/2022 Yes                Plan:        1. Hypertensive crisis with a personal history of essential hypertension  1. Continue Cardene drip  2. Restart oral regiment once no longer nauseated  2. ESRD requiring dialysis  1. Dialysis on nephrology timeline  3. Diabetic gastroparesis with epigastric pain with chronic colitis  1. Gentle IV hydration at nephrology recommendation  2. Initiate IV Reglan as patient has failed Phenergan and Zofran  3.  Consult GI    ENEIDA Jordan NP  2/2/2022  1:01 PM

## 2022-02-02 NOTE — FLOWSHEET NOTE
Patient was sleeping. TV, lights were off.  leaves prayer card for possible follow up.  shared in presence, prayer. Follow up as needed.      02/01/22 1916   Encounter Summary   Services provided to: Patient   Referral/Consult From: Saleem   Continue Visiting   (2-1-22 PT, sleeping)   Complexity of Encounter Low   Length of Encounter 15 minutes   Routine   Type Initial   Assessment Sleeping   Intervention Prayer

## 2022-02-03 LAB
ABSOLUTE EOS #: 0.15 K/UL (ref 0–0.44)
ABSOLUTE IMMATURE GRANULOCYTE: 0.04 K/UL (ref 0–0.3)
ABSOLUTE LYMPH #: 1.3 K/UL (ref 1.1–3.7)
ABSOLUTE MONO #: 0.59 K/UL (ref 0.1–1.2)
ALBUMIN SERPL-MCNC: 3.1 G/DL (ref 3.5–5.2)
ALBUMIN/GLOBULIN RATIO: ABNORMAL (ref 1–2.5)
ALP BLD-CCNC: 89 U/L (ref 35–104)
ALT SERPL-CCNC: 14 U/L (ref 5–33)
ANION GAP SERPL CALCULATED.3IONS-SCNC: 11 MMOL/L (ref 9–17)
AST SERPL-CCNC: 12 U/L
BASOPHILS # BLD: 1 % (ref 0–2)
BASOPHILS ABSOLUTE: 0.05 K/UL (ref 0–0.2)
BILIRUB SERPL-MCNC: 0.24 MG/DL (ref 0.3–1.2)
BUN BLDV-MCNC: 22 MG/DL (ref 6–20)
BUN/CREAT BLD: 5 (ref 9–20)
CALCIUM SERPL-MCNC: 8.4 MG/DL (ref 8.6–10.4)
CHLORIDE BLD-SCNC: 103 MMOL/L (ref 98–107)
CO2: 21 MMOL/L (ref 20–31)
CREAT SERPL-MCNC: 4.61 MG/DL (ref 0.5–0.9)
DIFFERENTIAL TYPE: ABNORMAL
EOSINOPHILS RELATIVE PERCENT: 3 % (ref 1–4)
GFR AFRICAN AMERICAN: 12 ML/MIN
GFR NON-AFRICAN AMERICAN: 10 ML/MIN
GFR SERPL CREATININE-BSD FRML MDRD: ABNORMAL ML/MIN/{1.73_M2}
GFR SERPL CREATININE-BSD FRML MDRD: ABNORMAL ML/MIN/{1.73_M2}
GLUCOSE BLD-MCNC: 130 MG/DL (ref 65–105)
GLUCOSE BLD-MCNC: 141 MG/DL (ref 65–105)
GLUCOSE BLD-MCNC: 151 MG/DL (ref 70–99)
GLUCOSE BLD-MCNC: 156 MG/DL (ref 65–105)
GLUCOSE BLD-MCNC: 165 MG/DL (ref 65–105)
HCT VFR BLD CALC: 39.8 % (ref 36.3–47.1)
HEMOGLOBIN: 12.3 G/DL (ref 11.9–15.1)
IMMATURE GRANULOCYTES: 1 %
LYMPHOCYTES # BLD: 22 % (ref 24–43)
MCH RBC QN AUTO: 28 PG (ref 25.2–33.5)
MCHC RBC AUTO-ENTMCNC: 30.9 G/DL (ref 28.4–34.8)
MCV RBC AUTO: 90.7 FL (ref 82.6–102.9)
MONOCYTES # BLD: 10 % (ref 3–12)
NRBC AUTOMATED: 0 PER 100 WBC
PDW BLD-RTO: 16.1 % (ref 11.8–14.4)
PHOSPHORUS: 4.6 MG/DL (ref 2.6–4.5)
PLATELET # BLD: 168 K/UL (ref 138–453)
PLATELET ESTIMATE: ABNORMAL
PMV BLD AUTO: 10.8 FL (ref 8.1–13.5)
POTASSIUM SERPL-SCNC: 3.7 MMOL/L (ref 3.7–5.3)
RBC # BLD: 4.39 M/UL (ref 3.95–5.11)
RBC # BLD: ABNORMAL 10*6/UL
SEG NEUTROPHILS: 63 % (ref 36–65)
SEGMENTED NEUTROPHILS ABSOLUTE COUNT: 3.71 K/UL (ref 1.5–8.1)
SODIUM BLD-SCNC: 135 MMOL/L (ref 135–144)
TOTAL PROTEIN: 5.8 G/DL (ref 6.4–8.3)
WBC # BLD: 5.8 K/UL (ref 3.5–11.3)
WBC # BLD: ABNORMAL 10*3/UL

## 2022-02-03 PROCEDURE — 2500000003 HC RX 250 WO HCPCS: Performed by: INTERNAL MEDICINE

## 2022-02-03 PROCEDURE — 82947 ASSAY GLUCOSE BLOOD QUANT: CPT

## 2022-02-03 PROCEDURE — 2000000000 HC ICU R&B

## 2022-02-03 PROCEDURE — 94761 N-INVAS EAR/PLS OXIMETRY MLT: CPT

## 2022-02-03 PROCEDURE — 2580000003 HC RX 258: Performed by: NURSE PRACTITIONER

## 2022-02-03 PROCEDURE — 85025 COMPLETE CBC W/AUTO DIFF WBC: CPT

## 2022-02-03 PROCEDURE — 99232 SBSQ HOSP IP/OBS MODERATE 35: CPT | Performed by: INTERNAL MEDICINE

## 2022-02-03 PROCEDURE — C9113 INJ PANTOPRAZOLE SODIUM, VIA: HCPCS | Performed by: NURSE PRACTITIONER

## 2022-02-03 PROCEDURE — APPSS30 APP SPLIT SHARED TIME 16-30 MINUTES: Performed by: NURSE PRACTITIONER

## 2022-02-03 PROCEDURE — 36415 COLL VENOUS BLD VENIPUNCTURE: CPT

## 2022-02-03 PROCEDURE — 90935 HEMODIALYSIS ONE EVALUATION: CPT

## 2022-02-03 PROCEDURE — 80053 COMPREHEN METABOLIC PANEL: CPT

## 2022-02-03 PROCEDURE — 6360000002 HC RX W HCPCS: Performed by: NURSE PRACTITIONER

## 2022-02-03 PROCEDURE — 84100 ASSAY OF PHOSPHORUS: CPT

## 2022-02-03 PROCEDURE — 6370000000 HC RX 637 (ALT 250 FOR IP): Performed by: NURSE PRACTITIONER

## 2022-02-03 RX ORDER — ALBUMIN (HUMAN) 12.5 G/50ML
12.5 SOLUTION INTRAVENOUS PRN
Status: DISCONTINUED | OUTPATIENT
Start: 2022-02-03 | End: 2022-02-04 | Stop reason: HOSPADM

## 2022-02-03 RX ORDER — TRAMADOL HYDROCHLORIDE 50 MG/1
50 TABLET ORAL ONCE
Status: COMPLETED | OUTPATIENT
Start: 2022-02-03 | End: 2022-02-03

## 2022-02-03 RX ORDER — ALBUMIN (HUMAN) 12.5 G/50ML
12.5 SOLUTION INTRAVENOUS PRN
Status: DISCONTINUED | OUTPATIENT
Start: 2022-02-03 | End: 2022-02-03

## 2022-02-03 RX ADMIN — HEPARIN SODIUM 5000 UNITS: 5000 INJECTION, SOLUTION INTRAVENOUS; SUBCUTANEOUS at 06:10

## 2022-02-03 RX ADMIN — SODIUM CHLORIDE, PRESERVATIVE FREE 10 ML: 5 INJECTION INTRAVENOUS at 21:04

## 2022-02-03 RX ADMIN — Medication 1.7 ML: at 15:34

## 2022-02-03 RX ADMIN — MORPHINE SULFATE 2 MG: 2 INJECTION, SOLUTION INTRAMUSCULAR; INTRAVENOUS at 21:14

## 2022-02-03 RX ADMIN — NIFEDIPINE 60 MG: 30 TABLET, FILM COATED, EXTENDED RELEASE ORAL at 08:20

## 2022-02-03 RX ADMIN — MORPHINE SULFATE 4 MG: 4 INJECTION, SOLUTION INTRAMUSCULAR; INTRAVENOUS at 11:32

## 2022-02-03 RX ADMIN — GABAPENTIN 200 MG: 100 CAPSULE ORAL at 21:01

## 2022-02-03 RX ADMIN — ACETAMINOPHEN 650 MG: 325 TABLET ORAL at 00:59

## 2022-02-03 RX ADMIN — HYDRALAZINE HYDROCHLORIDE 100 MG: 50 TABLET, FILM COATED ORAL at 08:20

## 2022-02-03 RX ADMIN — HYDRALAZINE HYDROCHLORIDE 100 MG: 50 TABLET, FILM COATED ORAL at 17:02

## 2022-02-03 RX ADMIN — GABAPENTIN 200 MG: 100 CAPSULE ORAL at 08:20

## 2022-02-03 RX ADMIN — PANTOPRAZOLE SODIUM 40 MG: 40 INJECTION, POWDER, FOR SOLUTION INTRAVENOUS at 08:25

## 2022-02-03 RX ADMIN — CLONIDINE HYDROCHLORIDE 0.1 MG: 0.1 TABLET ORAL at 09:55

## 2022-02-03 RX ADMIN — ONDANSETRON 4 MG: 2 INJECTION INTRAMUSCULAR; INTRAVENOUS at 03:35

## 2022-02-03 RX ADMIN — LABETALOL HYDROCHLORIDE 600 MG: 200 TABLET, FILM COATED ORAL at 21:01

## 2022-02-03 RX ADMIN — LABETALOL HYDROCHLORIDE 600 MG: 200 TABLET, FILM COATED ORAL at 17:02

## 2022-02-03 RX ADMIN — HYDRALAZINE HYDROCHLORIDE 100 MG: 50 TABLET, FILM COATED ORAL at 00:53

## 2022-02-03 RX ADMIN — TRAMADOL HYDROCHLORIDE 50 MG: 50 TABLET, COATED ORAL at 04:58

## 2022-02-03 RX ADMIN — INSULIN LISPRO 3 UNITS: 100 INJECTION, SOLUTION INTRAVENOUS; SUBCUTANEOUS at 08:44

## 2022-02-03 RX ADMIN — SODIUM CHLORIDE, PRESERVATIVE FREE 30 ML: 5 INJECTION INTRAVENOUS at 09:59

## 2022-02-03 RX ADMIN — INSULIN LISPRO 2 UNITS: 100 INJECTION, SOLUTION INTRAVENOUS; SUBCUTANEOUS at 21:13

## 2022-02-03 RX ADMIN — HEPARIN SODIUM 5000 UNITS: 5000 INJECTION, SOLUTION INTRAVENOUS; SUBCUTANEOUS at 17:02

## 2022-02-03 RX ADMIN — LABETALOL HYDROCHLORIDE 20 MG: 5 INJECTION INTRAVENOUS at 08:40

## 2022-02-03 RX ADMIN — GABAPENTIN 200 MG: 100 CAPSULE ORAL at 17:01

## 2022-02-03 RX ADMIN — INSULIN GLARGINE 20 UNITS: 100 INJECTION, SOLUTION SUBCUTANEOUS at 08:44

## 2022-02-03 RX ADMIN — ONDANSETRON 4 MG: 2 INJECTION INTRAMUSCULAR; INTRAVENOUS at 08:25

## 2022-02-03 RX ADMIN — LABETALOL HYDROCHLORIDE 600 MG: 200 TABLET, FILM COATED ORAL at 08:35

## 2022-02-03 RX ADMIN — PANTOPRAZOLE SODIUM 40 MG: 40 INJECTION, POWDER, FOR SOLUTION INTRAVENOUS at 20:29

## 2022-02-03 RX ADMIN — ATORVASTATIN CALCIUM 10 MG: 10 TABLET, FILM COATED ORAL at 21:02

## 2022-02-03 RX ADMIN — HEPARIN SODIUM 5000 UNITS: 5000 INJECTION, SOLUTION INTRAVENOUS; SUBCUTANEOUS at 23:04

## 2022-02-03 RX ADMIN — ONDANSETRON 4 MG: 2 INJECTION INTRAMUSCULAR; INTRAVENOUS at 17:03

## 2022-02-03 RX ADMIN — MORPHINE SULFATE 2 MG: 2 INJECTION, SOLUTION INTRAMUSCULAR; INTRAVENOUS at 17:09

## 2022-02-03 RX ADMIN — ONDANSETRON 4 MG: 2 INJECTION INTRAMUSCULAR; INTRAVENOUS at 00:52

## 2022-02-03 RX ADMIN — ASPIRIN 81 MG: 81 TABLET, COATED ORAL at 08:19

## 2022-02-03 RX ADMIN — MORPHINE SULFATE 4 MG: 2 INJECTION, SOLUTION INTRAMUSCULAR; INTRAVENOUS at 08:39

## 2022-02-03 RX ADMIN — ESCITALOPRAM OXALATE 20 MG: 10 TABLET ORAL at 08:20

## 2022-02-03 RX ADMIN — METOCLOPRAMIDE 5 MG: 5 INJECTION, SOLUTION INTRAMUSCULAR; INTRAVENOUS at 20:29

## 2022-02-03 RX ADMIN — ONDANSETRON 4 MG: 2 INJECTION INTRAMUSCULAR; INTRAVENOUS at 20:29

## 2022-02-03 RX ADMIN — METOCLOPRAMIDE 5 MG: 5 INJECTION, SOLUTION INTRAMUSCULAR; INTRAVENOUS at 08:25

## 2022-02-03 RX ADMIN — Medication 1.8 ML: at 15:34

## 2022-02-03 ASSESSMENT — PAIN SCALES - GENERAL
PAINLEVEL_OUTOF10: 7
PAINLEVEL_OUTOF10: 7
PAINLEVEL_OUTOF10: 5
PAINLEVEL_OUTOF10: 6
PAINLEVEL_OUTOF10: 8
PAINLEVEL_OUTOF10: 7
PAINLEVEL_OUTOF10: 7

## 2022-02-03 ASSESSMENT — PAIN DESCRIPTION - LOCATION: LOCATION: SHOULDER

## 2022-02-03 ASSESSMENT — PAIN DESCRIPTION - ONSET: ONSET: ON-GOING

## 2022-02-03 ASSESSMENT — PAIN DESCRIPTION - DESCRIPTORS: DESCRIPTORS: SORE;PRESSURE

## 2022-02-03 ASSESSMENT — PAIN DESCRIPTION - PROGRESSION: CLINICAL_PROGRESSION: GRADUALLY IMPROVING

## 2022-02-03 ASSESSMENT — PAIN DESCRIPTION - PAIN TYPE: TYPE: ACUTE PAIN

## 2022-02-03 ASSESSMENT — PAIN DESCRIPTION - ORIENTATION: ORIENTATION: LEFT;UPPER

## 2022-02-03 ASSESSMENT — PAIN DESCRIPTION - FREQUENCY: FREQUENCY: INTERMITTENT

## 2022-02-03 NOTE — PROGRESS NOTES
[facility]    Gastroenterology Daily Progress Note      Patient:   Bambi Bob   :    1970   Facility:   [facility]  Date:     2/3/2022  Consultant:   ENEIDA Dow CNP, CNP      SUBJECTIVE  49 y.o. female admitted 2022 with ESRD needing dialysis ([facility] [address] [N18.6, Z99.2]  Uncontrolled hypertension [I10] and seen for abdominal pain with nausea  and emesis. The pt was seen and examined. No c/o abdominal pain or nausea. Tolerating clear diet. kub showed no obstruction .         OBJECTIVE  Scheduled Meds:   pantoprazole  40 mg IntraVENous BID    metoclopramide  5 mg IntraVENous BID    ondansetron  4 mg IntraVENous 6 times per day    aspirin  81 mg Oral Daily    atorvastatin  10 mg Oral Nightly    [Held by provider] bumetanide  4 mg Oral BID AC    escitalopram  20 mg Oral QAM    gabapentin  200 mg Oral TID    hydrALAZINE  100 mg Oral Q8H    insulin glargine  20 Units SubCUTAneous Daily    labetalol  600 mg Oral TID    NIFEdipine  60 mg Oral BID    oxybutynin  10 mg Oral QAM    scopolamine  1 patch TransDERmal Q72H    sodium chloride flush  5-40 mL IntraVENous 2 times per day    heparin (porcine)  5,000 Units SubCUTAneous 3 times per day    insulin lispro  0-18 Units SubCUTAneous TID WC    insulin lispro  0-9 Units SubCUTAneous Nightly       Vital Signs:  BP (!) 176/88   Pulse 88   Temp 97.7 °F (36.5 °C) (Temporal)   Resp 17   Ht 5' 3\" (1.6 m)   Wt 126 lb 9.6 oz (57.4 kg)   LMP  (LMP Unknown)   SpO2 100%   BMI 22.43 kg/m²      Physical Exam:     General Appearance: alert and oriented to person, place and time, well-developed and well-nourished, in no acute distress  Skin: warm and dry, no rash or erythema  Head: normocephalic and atraumatic  Eyes: pupils equal, round, and reactive to light, extraocular eye movements intact, conjunctivae normal  ENT: hearing grossly normal bilaterally  Neck: neck supple and non tender without mass, no thyromegaly or thyroid nodules, no cervical lymphadenopathy   Pulmonary/Chest: clear to auscultation bilaterally- no wheezes, rales or rhonchi, normal air movement, no respiratory distress  Cardiovascular: normal rate, regular rhythm, normal S1 and S2, no murmurs, rubs, clicks or gallops, distal pulses intact, no carotid bruits  Abdomen: soft, non-tender, non-distended, normal bowel sounds, no masses or organomegaly  Extremities: no cyanosis, clubbing or edema  Musculoskeletal: normal range of motion, no joint swelling, deformity or tenderness  Neurologic: no cranial nerve deficit and muscle strength normal    Lab and Imaging Review     CBC  Recent Labs     02/01/22  0518 02/03/22  0322   WBC 7.5 5.8   HGB 13.6 12.3   HCT 42.2 39.8   MCV 87.7 90.7    168       BMP  Recent Labs     02/01/22  0518 02/02/22  0351 02/03/22  0322    139 135   K 3.9 3.4* 3.7    106 103   CO2 25 22 21   BUN 49* 18 22*   CREATININE 4.63* 3.35* 4.61*   GLUCOSE 127* 132* 151*   CALCIUM 9.2 8.7 8.4*       LFTS  Recent Labs     02/02/22  0351 02/03/22  0322   ALKPHOS 92 89   ALT 17 14   AST 18 12   PROT 5.8* 5.8*   BILITOT 0.17* 0.24*   BILIDIR 0.10  --    LABALBU 3.2* 3.1*         PT/INR  Recent Labs     02/01/22 0518   PROTIME 12.5   INR 0.9       FINDINGS:kub 2/22/22   Bowel gas pattern is unremarkable. Dorothe [place] is no evidence for bowel   distention.  There is no evidence of organomegaly or ascites. Dorothe [place] is a   phlebolith in the pelvis.  Bony structures are unremarkable.           Impression   No abnormalities demonstrated. ASSESSMENT/plan  1. Abdominal pain with nausea and emesis, hx diabetic gastroparesis, improved lft normal  -advance diet as tolerated  -continue antiemetics and reglan/ppi  -stop marijuana    2.hx crohns colitis  outpt colonoscopy      This plan was formulated in collaboration with Dr. Annika Spain .     Electronically signed by: ENEIDA Pratt CNP on 2/3/2022 at 11:43 AM     Attending Physician Statement  I have discussed the care of Ebonie Allan and   I have examined the patient myselft independently, and taken ros and hpi , including pertinent history and exam findings,  with the author of this note . I have reviewed the key elements of all parts of the encounter with the nurse practitioner/resident.     I agree with the assessment, plan and orders as documented by the above health care provider     Symptoms almost resolved she is doing a lot better with Reglan and PPI    Patient had a history of colitis in the past  CAT scan from December did show that but the patient is asymptomatic at this time that needs to be investigated further as an outpatient  Her liver enzyme has normalized    Electronically signed by Sunny Guzman MD

## 2022-02-03 NOTE — PROGRESS NOTES
Nephrology Progress Note        Subjective: Patient was seen and examined. Dialysis was in progress. Dialysis orders were reviewed with dialysis nurses. Her blood pressure is now better controlled. Fluid removal for today is 1 to 1.5 L so far patient is tolerating well. She states that her nausea and vomiting has improved too.   Labs reviewed  Objective:  CURRENT TEMPERATURE:  Temp: 98.7 °F (37.1 °C)  MAXIMUM TEMPERATURE OVER 24HRS:  Temp (24hrs), Av.3 °F (36.8 °C), Min:97.6 °F (36.4 °C), Max:99.3 °F (37.4 °C)    CURRENT RESPIRATORY RATE:  Resp: 17  CURRENT PULSE:  Pulse: 84  CURRENT BLOOD PRESSURE:  BP: 124/77  24HR BLOOD PRESSURE RANGE:  Systolic (62DEF), OWF:244 , Min:109 , WPX:835   ; Diastolic (16KWE), FLOR:28, Min:58, Max:121    24HR INTAKE/OUTPUT:      Intake/Output Summary (Last 24 hours) at 2/3/2022 1329  Last data filed at 2/3/2022 0600  Gross per 24 hour   Intake 360 ml   Output 300 ml   Net 60 ml     Weight:      Physical Exam:  General appearance: Awake and alert x3   skin: Warm to touch and no erythema  Eyes: Conjunctiva was pink  Neck: No JVD  Pulmonary: Bilateral air entry and clear  Cardiovascular: Regular heart rate s  Abdomen: Soft and not tender and not significantly distended with positive bowel sounds   Extremities: No edema   Access:  previous tunneled dialysis catheter    Current Medications:    morphine (PF) injection 2 mg, Q2H PRN   Or  morphine sulfate (PF) injection 4 mg, Q2H PRN  pantoprazole (PROTONIX) injection 40 mg, BID  metoclopramide (REGLAN) injection 5 mg, BID  ondansetron (ZOFRAN) injection 4 mg, 6 times per day  niCARdipine (CARDENE) 50 mg in dextrose 5 % 250 mL infusion, Continuous  dextrose 5 % and 0.9 % sodium chloride infusion, Continuous  anticoagulant sodium citrate 4 % injection 1.7 mL, PRN  anticoagulant sodium citrate 4 % injection 1.8 mL, PRN  aspirin EC tablet 81 mg, Daily  atorvastatin (LIPITOR) tablet 10 mg, Nightly  [Held by provider] bumetanide (BUMEX) tablet 4 mg, BID AC  cloNIDine (CATAPRES) tablet 0.1 mg, Q6H PRN  escitalopram (LEXAPRO) tablet 20 mg, QAM  gabapentin (NEURONTIN) capsule 200 mg, TID  hydrALAZINE (APRESOLINE) tablet 100 mg, Q8H  insulin glargine (LANTUS) injection vial 20 Units, Daily  labetalol (NORMODYNE) tablet 600 mg, TID  NIFEdipine (PROCARDIA XL) extended release tablet 60 mg, BID  oxybutynin (DITROPAN-XL) extended release tablet 10 mg, QAM  sennosides-docusate sodium (SENOKOT-S) 8.6-50 MG tablet 2 tablet, Daily PRN  scopolamine (TRANSDERM-SCOP) transdermal patch 1 patch, Q72H  sodium chloride flush 0.9 % injection 5-40 mL, 2 times per day  sodium chloride flush 0.9 % injection 10 mL, PRN  0.9 % sodium chloride infusion, PRN  polyethylene glycol (GLYCOLAX) packet 17 g, Daily PRN  acetaminophen (TYLENOL) tablet 650 mg, Q6H PRN   Or  acetaminophen (TYLENOL) suppository 650 mg, Q6H PRN  heparin (porcine) injection 5,000 Units, 3 times per day  LORazepam (ATIVAN) injection 2 mg, Q6H PRN  glucose (GLUTOSE) 40 % oral gel 15 g, PRN  glucagon (rDNA) injection 1 mg, PRN  dextrose 5 % solution, PRN  dextrose bolus (hypoglycemia) 10% 125 mL, PRN   Or  dextrose bolus (hypoglycemia) 10% 250 mL, PRN  labetalol (NORMODYNE;TRANDATE) injection 20 mg, Q6H PRN  insulin lispro (HUMALOG) injection vial 0-18 Units, TID WC  insulin lispro (HUMALOG) injection vial 0-9 Units, Nightly      Labs:   CBC:   Recent Labs     02/01/22  0518 02/03/22  0322   WBC 7.5 5.8   RBC 4.81 4.39   HGB 13.6 12.3   HCT 42.2 39.8   MCV 87.7 90.7   MCH 28.3 28.0   MCHC 32.2 30.9   RDW 15.6* 16.1*    168   MPV 10.3 10.8      BMP:   Recent Labs     02/01/22  0518 02/02/22 0351 02/03/22 0322    139 135   K 3.9 3.4* 3.7    106 103   CO2 25 22 21   BUN 49* 18 22*   CREATININE 4.63* 3.35* 4.61*   GLUCOSE 127* 132* 151*   CALCIUM 9.2 8.7 8.4*        Phosphorus:    Recent Labs     02/02/22 0351 02/03/22 0322   PHOS 4.2 4.6*     Magnesium:   Recent Labs     02/02/22 0351 MG 1.9     Albumin:   Recent Labs     02/02/22  0351 02/03/22  0322   LABALBU 3.2* 3.1*       Dialysis bath: Dialysis Bath  K+ (Potassium): 4  Ca+ (Calcium): 2.25  Na+ (Sodium): 138  HCO3 (Bicarb): 34    Radiology:  Reviewed as available. Assessment:  1. Incisional disease regular dialysis days are Tuesday Thursday Saturdays. Dialysis orders were reviewed and patient is tolerating dialysis fairly   2. Diabetic gastroparesis symptoms are relatively better  3. Longstanding diabetes   4. Uncontrolled hypertension: With fluid removal her blood pressures are better now patient is no longer on nicardipine drip         Plan:  1. Keep IV fluid to KVO  2. Dialysis as ordered  3. BMP in a.m.  4.  Discharge planning as per primary service    Please do not hesitate to call with questions.     Electronically signed by Sid Meza MD on 2/3/2022 at 1:29 PM

## 2022-02-03 NOTE — PROGRESS NOTES
[facility]  Office: [address], DO, Tanya Higginbotham, DO, Alruby Bend, DO, Silvestre Maya Blood, DO, Rusty Lara MD, Millie Soto MD, Ketan Nettles MD, Amy Merino MD, Zak Breen MD, Rubin Bundy MD, Twyla Aguirre MD, Jeff Samano, DO, Jose Zavala, DO, Autumn Qureshi MD,  Johann Galeazzi, DO, Tessa Cox MD, Cat Mckeon MD, Dave Rowe MD, Jerica Rascon MD, Mike Ingram MD, Harmeet Smith, [facility], [facility], CNP, Tsering Wilcox, CNP, Faye Street, CNS, Rocky Sampson, CNP, Jesica Bonilla, CNP, Amina Gillespie, CNP, Rhonda Covarrubias, CNP, Linda Siu, CNP, Lillie Beckford PA-C, Cyndi Chappell DNP, Hi Christian DNP, Lorena Allen, CNP, Evi Miner, CNP, Andreia Colmenares, CNP, Jeana Person, CNP, Jem Bradley, CNP, Clayton Miller, [facility]    Progress Note    2/3/2022    12:45 PM    Name:   Anoop Ospina  MRN:     [id number]     Acct:      [de-identified]   Room:   2033/2033-01   Day:  3  Admit Date:  1/31/2022  7:42 AM    PCP:   ENEIDA Cervantes CNP  Code Status:  Full Code    Subjective:     C/C:   Chief Complaint   Patient presents with    Abdominal Pain     CAME FROM DIALYSIS INCREASE ABD PAIN DID NOT HAVE DIALYSIS TODAY    Emesis     SINCE FRIDAY 1/28     Interval History Status: improved. Dramatic improvement in condition overnight. Patient's diet has been advanced as her nausea and vomiting has resolved. Patient reports that she feels dramatically better and is requesting a regular lunch at the time my exam.  Patient has just taken her morning antihypertensives and remains on Cardene drip. Cardene drip will be weaned throughout the day. Patient did miss dialysis yesterday and nephrology is managing her dialysis schedule. We anticipate discharge within 24 hours if the patient's blood pressure remained stable and her dialysis schedule can be arranged.     Brief History:     1/31 - Patient has a known history of chronic abdominal pain with chronic nausea and ESRD requiring dialysis 3 times weekly. Patient reports that on Saturday she developed increased abdominal discomfort followed by nausea with persistent vomiting starting Sunday morning and persisting through today. Patient was unable to take any of her daily medications and has a personal history of hypertension. Patient was too ill to be dialyzed secondary to hypertensive crisis and intractable vomiting and was therefore sent to the emergency department. In the emergency department patient is found to be hypertensive and her lab work is consistent with ESRD requiring dialysis. 2/1 - Patient had an eventful night as her hypertensive crisis did not break and her severe nausea became worse. Nephrology was contacted about blood pressure concerns and she was ultimately started on a Cardene drip. At the time my exam patient blood pressure symptoms and the patient's nausea with vomiting have resolved. Unfortunately later in the morning pain returned and blood pressure quickly increased into the patient subsequently required being placed back up on a Cardene drip. Reglan was administered as the patient has now failed Zofran and Phenergan. We anticipate dialysis on nephrology's timeline. We also intend to reinitiate oral antihypertensives if GI symptoms can be controlled. 2/2 -patient's GI symptoms have failed to improve despite hydration, Zofran, Phenergan, Reglan. We will consult GI. Patient remains on Cardene drip as she has been unable to take any oral antihypertensives.     2/3 - Anticipate discharge within 24 hours if blood pressure remains stable on oral regimen     Review of Systems:     Constitutional:  negative for chills, fevers, sweats  Respiratory:  negative for cough, dyspnea on exertion, shortness of breath, wheezing  Cardiovascular:  negative for chest pain, chest pressure/discomfort, lower extremity edema, palpitations  Gastrointestinal:  negative for abdominal pain, constipation, diarrhea, nausea, vomiting  Neurological:  negative for dizziness, headache    Medications: Allergies: Allergies   Allergen Reactions    Tape Vielka Garrison Tape]        Current Meds:   Scheduled Meds:    pantoprazole  40 mg IntraVENous BID    metoclopramide  5 mg IntraVENous BID    ondansetron  4 mg IntraVENous 6 times per day    aspirin  81 mg Oral Daily    atorvastatin  10 mg Oral Nightly    [Held by provider] bumetanide  4 mg Oral BID AC    escitalopram  20 mg Oral QAM    gabapentin  200 mg Oral TID    hydrALAZINE  100 mg Oral Q8H    insulin glargine  20 Units SubCUTAneous Daily    labetalol  600 mg Oral TID    NIFEdipine  60 mg Oral BID    oxybutynin  10 mg Oral QAM    scopolamine  1 patch TransDERmal Q72H    sodium chloride flush  5-40 mL IntraVENous 2 times per day    heparin (porcine)  5,000 Units SubCUTAneous 3 times per day    insulin lispro  0-18 Units SubCUTAneous TID WC    insulin lispro  0-9 Units SubCUTAneous Nightly     Continuous Infusions:    niCARdipine Stopped (02/02/22 1907)    dextrose 5 % and 0.9 % NaCl 30 mL/hr at 02/02/22 1135    sodium chloride      dextrose       PRN Meds: morphine **OR** morphine, anticoagulant sodium citrate, anticoagulant sodium citrate, cloNIDine, sennosides-docusate sodium, sodium chloride flush, sodium chloride, polyethylene glycol, acetaminophen **OR** acetaminophen, LORazepam, glucose, glucagon (rDNA), dextrose, dextrose bolus (hypoglycemia) **OR** dextrose bolus (hypoglycemia), labetalol    Data:     Past Medical History:   has a past medical history of Diabetes mellitus ([facility] [address] Diabetic neuropathy ([facility] [address] and Hypertension. Social History:   reports that she has been smoking. She has been smoking about 1.00 pack per day. She has never used smokeless tobacco. She reports current alcohol use of about 6.0 standard drinks of alcohol per week.  She reports that she does not use drugs. Family History:   Family History   Problem Relation Age of Onset    No Known Problems Mother     No Known Problems Father        Vitals:  BP (!) 144/83   Pulse 86   Temp 98.7 °F (37.1 °C) (Temporal)   Resp 17   Ht 5' 3\" (1.6 m)   Wt 126 lb 9.6 oz (57.4 kg)   LMP  (LMP Unknown)   SpO2 98%   BMI 22.43 kg/m²   Temp (24hrs), Av.3 °F (36.8 °C), Min:97.6 °F (36.4 °C), Max:99.3 °F (37.4 °C)    Recent Labs     22  1630 22  2036 22  0743 22  1123   POCGLU 115* 107* 156* 141*       I/O (24Hr):     Intake/Output Summary (Last 24 hours) at 2/3/2022 1245  Last data filed at 2/3/2022 0600  Gross per 24 hour   Intake 360 ml   Output 300 ml   Net 60 ml       Labs:  Hematology:  Recent Labs     22  0518 22  0322   WBC 7.5 5.8   RBC 4.81 4.39   HGB 13.6 12.3   HCT 42.2 39.8   MCV 87.7 90.7   MCH 28.3 28.0   MCHC 32.2 30.9   RDW 15.6* 16.1*    168   MPV 10.3 10.8   INR 0.9  --      Chemistry:  Recent Labs     22  1331 22  0518 22  0351 22  0322   NA  --  141 139 135   K  --  3.9 3.4* 3.7   CL  --  104 106 103   CO2  --  25 22 21   GLUCOSE  --  127* 132* 151*   BUN  --  49* 18 22*   CREATININE  --  4.63* 3.35* 4.61*   MG  --   --  1.9  --    ANIONGAP  --  12 11 11   LABGLOM  --  10* 15* 10*   GFRAA  --  12* 18* 12*   CALCIUM  --  9.2 8.7 8.4*   PHOS  --   --  4.2 4.6*   TROPHS 178*  --   --   --    MYOGLOBIN 182*  --   --   --      Recent Labs     22  19522  03522  0738 22  1130 22  1630 22  2036 22  0322 22  0743 22  1123   PROT  --  5.8*  --   --   --   --  5.8*  --   --    LABALBU  --  3.2*  --   --   --   --  3.1*  --   --    AST  --  18  --   --   --   --  12  --   --    ALT  --  17  --   --   --   --  14  --   --    ALKPHOS  --  92  --   --   --   --  89  --   --    BILITOT  --  0.17*  --   --   --   --  0.24*  --   --    BILIDIR  --  0.10  --   --   --   --   --   --   -- POCGLU   < >  --  208* 155* 115* 107*  --  156* 141*    < > = values in this interval not displayed. ABG:  Lab Results   Component Value Date    FIO2 NOT REPORTED 12/01/2021     Lab Results   Component Value Date/Time    SPECIAL R HAND 12ML 12/01/2021 07:46 PM     Lab Results   Component Value Date/Time    CULTURE NO GROWTH 5 DAYS 12/01/2021 07:46 PM       Radiology:  XR CHEST PORTABLE    Result Date: 1/31/2022  No acute findings. Physical Examination:        General appearance:  alert, cooperative and no distress  Mental Status:  oriented to person, place and time and normal affect  Lungs:  clear to auscultation bilaterally, normal effort  Heart:  regular rate and rhythm, no murmur  Abdomen:  soft, nontender, nondistended, normal bowel sounds, no masses, hepatomegaly, splenomegaly  Extremities:  no edema, redness, tenderness in the calves  Skin:  no gross lesions, rashes, induration    Assessment:        Hospital Problems           Last Modified POA    * (Principal) Hypertensive crisis 1/31/2022 Yes    Colitis 1/31/2022 Yes    Epigastric pain 1/31/2022 Yes    Overview Signed 12/1/2021  7:05 PM by ENEIDA Vieira NP     Formatting of this note might be different from the original.  Added automatically from request for surgery [id number]         GERD (gastroesophageal reflux disease) 1/31/2022 Yes    Dyslipidemia, goal LDL below 70 1/31/2022 Yes    Uncontrolled type 2 diabetes mellitus ([facility] [address] 1/31/2022 Yes    Uncontrolled hypertension 2/2/2022 Yes    ESRD needing dialysis ([facility] [address] 1/31/2022 Yes    Tobacco use 1/31/2022 Yes    Troponin level elevated 1/31/2022 Yes    Gastroparesis due to DM ([facility] [address] 2/3/2022 Yes                Plan:        1. Hypertensive crisis with a personal history of essential hypertension  1. Continue Cardene drip, wean throughout the day as able  2. Restart oral regiment this morning  3.  Anticipate discharge within 24 hours if blood pressure remains stable on oral regimen and dialysis schedule has been arranged. 2. ESRD requiring dialysis  1. Dialysis on nephrology timeline, possibly today as she missed yesterday  3. Diabetic gastroparesis with epigastric pain with chronic colitis  1. Gentle IV hydration at nephrology recommendation  2. Continue Reglan  3. Consult GI, little input from GI as she dramatically improved overnight.     ENEIDA Gordon - TIFFANIE  2/3/2022  12:45 PM

## 2022-02-03 NOTE — PROGRESS NOTES
HEMODIALYSIS PRE-TREATMENT NOTE    Patient Identifiers prior to treatment: Name, , MRN    Isolation Required: N/A                      Isolation Type: N/A            Hepatitis status:                           Date Drawn                             Result  Hepatitis B Surface Antigen 22 negative               Hepatitis B Surface Antibody 22 negative        Hepatitis B Core Antibody            How was Hepatitis Status verified: Patient's outpatient HD clinic faxed over hard copy.      Was a copy of the labs you documented provided to facility for the patient's chart:N/A    Hemodialysis orders verified: yes    Access Within normal limits: yes    Pre-Assessment completed: yes    Pre-dialysis report received from: Akhil Hong RN                    Time: 5214

## 2022-02-03 NOTE — PROGRESS NOTES
HEMODIALYSIS POST TREATMENT NOTE    Treatment time ordered: 210 minutes  Actual treatment time:  202 minutes     UltraFiltration Goal: 1000 ml  UltraFiltration Removed: 800 ml     Dr. Juan M Johnson notified that patient's treatment ended early due to clotted extracorporeal circuit. Patient's final treatment duration was 202 minutes. RN spoke with Dr. Juan M Johnson via telephone to report that patient's treatment time was ended early due to clotted extracorporeal circuit. MD stated that it is okay, no additional orders received at this time. HD CVC Sodium Citrate Locked. Pre Treatment weight: Unable to obtain, floor to weigh  Post Treatment weight: Unable to obtain, floor to weigh  Estimated Dry Weight:     Access used:     Central Venous Catheter: Right Subclavian HD CVC         Tunneled or Non-tunneled: Tunneled           Site: Right subclavian          Access Flow: Good      Internal Access:       AV Fistula or AV Graft: AV Fistula left arm is non-functioning, patient states that she has been following this issue with her primary Nephrologist and is supposed to get a Fistula gram.          Site: Left Forearm       Access Flow: AV Fistula left arm is non-functioning, patient states that she has been following this issue with her primary Nephrologist and is supposed to get a Fistula gram.        Sign and symptoms of infection: no           Medications or blood products given: N/A    Chronic outpatient schedule: MWF (per patient report)     [facility] Summary of response to treatment: Vitals stable, HD blood lines reversed half way through treatment due to high arterial pressures on HD machine despite repositioning patient and flushing line. After lines were reversed arterial pressures improved. Dr. Juan M Johnson notified that patient's treatment ended early due to clotted extracorporeal circuit. Patient's treatment duration was 202 minutes.    RN spoke with Dr. Juan M Johnson via telephone to report that patient's treatment time was ended early due to clotted extracorporeal circuit. MD stated that it is okay, no additional orders received at this time. Post assessment completed: yes    Report given to: Charline Bhagat RN      * Intra-treatment documented Safety Checks include the followin) Access and face visible at all times. 2) All connections and blood lines are secure with no kinks. 3) NVL alarm engaged. 4) Hemosafe device applied (if applicable). 5) No collapse of Arterial or Venous blood chambers. 6) All blood lines / pump segments in the air detectors.

## 2022-02-03 NOTE — FLOWSHEET NOTE
Patient is awake while sitting up in bed eating dinner. Patient is approachable and reports that she is feeling better. Patient states that she has family support from her spouse and child. Patient denies any spiritual or emotional concerns. Patient appears to be coping adequately and expresses appreciation for the visit. Spiritual Care will follow as needed. 02/03/22 1700   Encounter Summary   Services provided to: Patient   Referral/Consult From: [facility]   Support System Spouse; Children   Continue Visiting   (2/3/22)   Complexity of Encounter Low   Length of Encounter 15 minutes   Routine   Type Follow up   Assessment Approachable   Intervention Active listening;Explored coping resources;Nurtured hope   Outcome Expressed gratitude

## 2022-02-04 VITALS
WEIGHT: 134.48 LBS | BODY MASS INDEX: 23.83 KG/M2 | SYSTOLIC BLOOD PRESSURE: 148 MMHG | HEART RATE: 90 BPM | HEIGHT: 63 IN | RESPIRATION RATE: 14 BRPM | OXYGEN SATURATION: 100 % | TEMPERATURE: 98.6 F | DIASTOLIC BLOOD PRESSURE: 68 MMHG

## 2022-02-04 LAB
ABSOLUTE EOS #: 0.16 K/UL (ref 0–0.44)
ABSOLUTE IMMATURE GRANULOCYTE: 0.02 K/UL (ref 0–0.3)
ABSOLUTE LYMPH #: 1.17 K/UL (ref 1.1–3.7)
ABSOLUTE MONO #: 0.42 K/UL (ref 0.1–1.2)
ANION GAP SERPL CALCULATED.3IONS-SCNC: 11 MMOL/L (ref 9–17)
BASOPHILS # BLD: 1 % (ref 0–2)
BASOPHILS ABSOLUTE: 0.04 K/UL (ref 0–0.2)
BUN BLDV-MCNC: 16 MG/DL (ref 6–20)
BUN/CREAT BLD: 4 (ref 9–20)
CALCIUM SERPL-MCNC: 8 MG/DL (ref 8.6–10.4)
CHLORIDE BLD-SCNC: 102 MMOL/L (ref 98–107)
CO2: 22 MMOL/L (ref 20–31)
CREAT SERPL-MCNC: 3.56 MG/DL (ref 0.5–0.9)
DIFFERENTIAL TYPE: ABNORMAL
EOSINOPHILS RELATIVE PERCENT: 2 % (ref 1–4)
GFR AFRICAN AMERICAN: 16 ML/MIN
GFR NON-AFRICAN AMERICAN: 14 ML/MIN
GFR SERPL CREATININE-BSD FRML MDRD: ABNORMAL ML/MIN/{1.73_M2}
GFR SERPL CREATININE-BSD FRML MDRD: ABNORMAL ML/MIN/{1.73_M2}
GLUCOSE BLD-MCNC: 137 MG/DL (ref 65–105)
GLUCOSE BLD-MCNC: 151 MG/DL (ref 65–105)
GLUCOSE BLD-MCNC: 178 MG/DL (ref 70–99)
HCT VFR BLD CALC: 35.3 % (ref 36.3–47.1)
HEMOGLOBIN: 10.8 G/DL (ref 11.9–15.1)
IMMATURE GRANULOCYTES: 0 %
LYMPHOCYTES # BLD: 17 % (ref 24–43)
MCH RBC QN AUTO: 27.8 PG (ref 25.2–33.5)
MCHC RBC AUTO-ENTMCNC: 30.6 G/DL (ref 28.4–34.8)
MCV RBC AUTO: 91 FL (ref 82.6–102.9)
MONOCYTES # BLD: 6 % (ref 3–12)
NRBC AUTOMATED: 0 PER 100 WBC
PDW BLD-RTO: 15.7 % (ref 11.8–14.4)
PLATELET # BLD: 135 K/UL (ref 138–453)
PLATELET ESTIMATE: ABNORMAL
PMV BLD AUTO: 11 FL (ref 8.1–13.5)
POTASSIUM SERPL-SCNC: 4.3 MMOL/L (ref 3.7–5.3)
RBC # BLD: 3.88 M/UL (ref 3.95–5.11)
RBC # BLD: ABNORMAL 10*6/UL
RENIN ACTIVITY: 0.8 NG/ML/HR
RENIN COMMENT: NORMAL
SEG NEUTROPHILS: 74 % (ref 36–65)
SEGMENTED NEUTROPHILS ABSOLUTE COUNT: 5.02 K/UL (ref 1.5–8.1)
SODIUM BLD-SCNC: 135 MMOL/L (ref 135–144)
WBC # BLD: 6.8 K/UL (ref 3.5–11.3)
WBC # BLD: ABNORMAL 10*3/UL

## 2022-02-04 PROCEDURE — 82947 ASSAY GLUCOSE BLOOD QUANT: CPT

## 2022-02-04 PROCEDURE — APPSS30 APP SPLIT SHARED TIME 16-30 MINUTES: Performed by: NURSE PRACTITIONER

## 2022-02-04 PROCEDURE — 90935 HEMODIALYSIS ONE EVALUATION: CPT

## 2022-02-04 PROCEDURE — 36415 COLL VENOUS BLD VENIPUNCTURE: CPT

## 2022-02-04 PROCEDURE — C9113 INJ PANTOPRAZOLE SODIUM, VIA: HCPCS | Performed by: NURSE PRACTITIONER

## 2022-02-04 PROCEDURE — 94761 N-INVAS EAR/PLS OXIMETRY MLT: CPT

## 2022-02-04 PROCEDURE — 6370000000 HC RX 637 (ALT 250 FOR IP): Performed by: NURSE PRACTITIONER

## 2022-02-04 PROCEDURE — 80048 BASIC METABOLIC PNL TOTAL CA: CPT

## 2022-02-04 PROCEDURE — 6360000002 HC RX W HCPCS: Performed by: NURSE PRACTITIONER

## 2022-02-04 PROCEDURE — 85025 COMPLETE CBC W/AUTO DIFF WBC: CPT

## 2022-02-04 PROCEDURE — 2580000003 HC RX 258: Performed by: NURSE PRACTITIONER

## 2022-02-04 PROCEDURE — 99231 SBSQ HOSP IP/OBS SF/LOW 25: CPT | Performed by: INTERNAL MEDICINE

## 2022-02-04 PROCEDURE — 99238 HOSP IP/OBS DSCHRG MGMT 30/<: CPT | Performed by: INTERNAL MEDICINE

## 2022-02-04 RX ORDER — METOCLOPRAMIDE 5 MG/1
5 TABLET ORAL 4 TIMES DAILY
Qty: 28 TABLET | Refills: 0 | Status: SHIPPED | OUTPATIENT
Start: 2022-02-04 | End: 2022-11-01

## 2022-02-04 RX ORDER — ONDANSETRON 2 MG/ML
4 INJECTION INTRAMUSCULAR; INTRAVENOUS EVERY 6 HOURS PRN
Status: DISCONTINUED | OUTPATIENT
Start: 2022-02-04 | End: 2022-02-04 | Stop reason: HOSPADM

## 2022-02-04 RX ADMIN — ACETAMINOPHEN 650 MG: 325 TABLET ORAL at 15:58

## 2022-02-04 RX ADMIN — ONDANSETRON 4 MG: 2 INJECTION INTRAMUSCULAR; INTRAVENOUS at 12:13

## 2022-02-04 RX ADMIN — MORPHINE SULFATE 4 MG: 4 INJECTION, SOLUTION INTRAMUSCULAR; INTRAVENOUS at 08:48

## 2022-02-04 RX ADMIN — INSULIN GLARGINE 20 UNITS: 100 INJECTION, SOLUTION SUBCUTANEOUS at 08:51

## 2022-02-04 RX ADMIN — HEPARIN SODIUM 5000 UNITS: 5000 INJECTION, SOLUTION INTRAVENOUS; SUBCUTANEOUS at 06:02

## 2022-02-04 RX ADMIN — ESCITALOPRAM OXALATE 20 MG: 10 TABLET ORAL at 08:44

## 2022-02-04 RX ADMIN — MORPHINE SULFATE 2 MG: 2 INJECTION, SOLUTION INTRAMUSCULAR; INTRAVENOUS at 04:33

## 2022-02-04 RX ADMIN — ONDANSETRON 4 MG: 2 INJECTION INTRAMUSCULAR; INTRAVENOUS at 04:29

## 2022-02-04 RX ADMIN — ONDANSETRON 4 MG: 2 INJECTION INTRAMUSCULAR; INTRAVENOUS at 00:59

## 2022-02-04 RX ADMIN — ASPIRIN 81 MG: 81 TABLET, COATED ORAL at 08:44

## 2022-02-04 RX ADMIN — LABETALOL HYDROCHLORIDE 600 MG: 200 TABLET, FILM COATED ORAL at 08:44

## 2022-02-04 RX ADMIN — NIFEDIPINE 60 MG: 30 TABLET, FILM COATED, EXTENDED RELEASE ORAL at 08:44

## 2022-02-04 RX ADMIN — HYDRALAZINE HYDROCHLORIDE 100 MG: 50 TABLET, FILM COATED ORAL at 08:44

## 2022-02-04 RX ADMIN — GABAPENTIN 200 MG: 100 CAPSULE ORAL at 08:44

## 2022-02-04 RX ADMIN — SODIUM CHLORIDE, PRESERVATIVE FREE 30 ML: 5 INJECTION INTRAVENOUS at 08:50

## 2022-02-04 RX ADMIN — ONDANSETRON 4 MG: 2 INJECTION INTRAMUSCULAR; INTRAVENOUS at 08:45

## 2022-02-04 RX ADMIN — METOCLOPRAMIDE 5 MG: 5 INJECTION, SOLUTION INTRAMUSCULAR; INTRAVENOUS at 08:44

## 2022-02-04 RX ADMIN — HYDRALAZINE HYDROCHLORIDE 100 MG: 50 TABLET, FILM COATED ORAL at 00:53

## 2022-02-04 RX ADMIN — LABETALOL HYDROCHLORIDE 600 MG: 200 TABLET, FILM COATED ORAL at 15:58

## 2022-02-04 RX ADMIN — HYDRALAZINE HYDROCHLORIDE 100 MG: 50 TABLET, FILM COATED ORAL at 15:58

## 2022-02-04 RX ADMIN — PANTOPRAZOLE SODIUM 40 MG: 40 INJECTION, POWDER, FOR SOLUTION INTRAVENOUS at 08:50

## 2022-02-04 RX ADMIN — MORPHINE SULFATE 4 MG: 4 INJECTION, SOLUTION INTRAMUSCULAR; INTRAVENOUS at 12:14

## 2022-02-04 ASSESSMENT — PAIN DESCRIPTION - PAIN TYPE
TYPE: ACUTE PAIN
TYPE: ACUTE PAIN

## 2022-02-04 ASSESSMENT — PAIN DESCRIPTION - PROGRESSION
CLINICAL_PROGRESSION: NOT CHANGED

## 2022-02-04 ASSESSMENT — PAIN DESCRIPTION - FREQUENCY
FREQUENCY: CONTINUOUS
FREQUENCY: CONTINUOUS

## 2022-02-04 ASSESSMENT — PAIN DESCRIPTION - DESCRIPTORS
DESCRIPTORS: SORE;ACHING;CONSTANT
DESCRIPTORS: ACHING;CONSTANT;SORE

## 2022-02-04 ASSESSMENT — PAIN SCALES - GENERAL
PAINLEVEL_OUTOF10: 7

## 2022-02-04 ASSESSMENT — PAIN DESCRIPTION - ONSET
ONSET: ON-GOING
ONSET: ON-GOING

## 2022-02-04 ASSESSMENT — PAIN DESCRIPTION - LOCATION
LOCATION: CHEST;SHOULDER
LOCATION: CHEST;SHOULDER

## 2022-02-04 ASSESSMENT — PAIN - FUNCTIONAL ASSESSMENT
PAIN_FUNCTIONAL_ASSESSMENT: ACTIVITIES ARE NOT PREVENTED
PAIN_FUNCTIONAL_ASSESSMENT: ACTIVITIES ARE NOT PREVENTED

## 2022-02-04 ASSESSMENT — PAIN DESCRIPTION - ORIENTATION
ORIENTATION: LEFT
ORIENTATION: LEFT

## 2022-02-04 NOTE — PROGRESS NOTES
Patient given discharge instructions verbal and written including new medications and side effects information reviewed. Follow up appointment recommendations and outside referral to GI also included with discharge paperwork. Patient verbalizes understanding. Patient discharged with all personal belongings with family via wheelchair to home. See discharge event for time of discharge.

## 2022-02-04 NOTE — PROGRESS NOTES
[facility]  Office: [address], DO, Julienne Aranda, DO, Mani Butler, DO, Fariba Trujillo Blood, DO, Vaughn Arenas MD, Ten Smith MD, Mis Florez MD, Pk Durbin MD, Hemant Aleman MD, Chris Fishman MD, Vannessa Flor MD, Kalen Morris, DO, Sharmila Kign, DO, Lyndsey Rudolph MD,  Hamzah Northern, DO, Orion Gomez MD, Gabriel Hammond MD, Chintan Campos MD, Gretchen Hayward MD, Karla Bolton MD, Rich Ortega, [facility], [facility], CNP, Kamla Besti, CNP, Anderson Pouch, CNS, Antoine Hill, CNP, Mildred Villafana, CNP, Danita Salmeron, CNP, Carole Young, CNP, Carmelita Whittaker, CNP, IMELDA BaltazarC, Peter Flores, [facility], Loretta Ayala, [facility], Yelena Atkins, CNP, Chevy Cunha, CNP, Bryant Montes, CNP, Ching Templeton, CNP, Didi Romero, CNP, Jolly Dakin, [facility]    Progress Note    Name:   Ebonie Allan  MRN:     [id number]     Acct:      [de-identified]   Room:   2033/2033-01  IP Day:  4  Admit Date:  1/31/2022  7:42 AM    PCP:   ENEIDA Monroe CNP  Code Status:  Full Code    Subjective:     C/C:   Chief Complaint   Patient presents with    Abdominal Pain     CAME FROM DIALYSIS INCREASE ABD PAIN DID NOT HAVE DIALYSIS TODAY    Emesis     SINCE FRIDAY 1/28     Interval History Status: improved. Patient indicates her nausea and vomiting is improved. She tolerated a diet this morning without any issues. Has been off of Cardene drip, blood pressure stable systolic in [phone]. Patient indicates she has been referred to [place] clinic by her [facility] GI physicians for further therapy of gastroparesis. CBC BMP ordered for  Today. Patient indicates her outpatient dialysis is Monday Wednesday Friday and not Tuesday Thursday Saturday.   Brief History:     50-year-old with end-stage renal disease on hemodialysis, gastroparesis presented to ED with complaints of increased abdominal discomfort and nausea persistent vomiting. She missed her dialysis secondary to GI symptoms. She was found to be in hypertensive urgency for which she was started on Cardene drip with symptomatic control of nausea and vomiting . GI was consulted for further help since patient symptoms were persistent to IV hydration, Zofran Phenergan and Reglan. Patient subsequently improved with continued therapy. Underwent hemodialysis under nephrology recommendations. Review of Systems:     Constitutional:  negative for chills, fevers, sweats  Respiratory:  negative for cough, dyspnea on exertion, shortness of breath, wheezing  Cardiovascular:  negative for chest pain, chest pressure/discomfort, lower extremity edema, palpitations  Gastrointestinal:  negative for abdominal pain, constipation, diarrhea, nausea, vomiting  Neurological:  negative for dizziness, headache    Medications: Allergies:     Allergies   Allergen Reactions    Tape Apryl Brass Tape]        Current Meds:   Scheduled Meds:    pantoprazole  40 mg IntraVENous BID    metoclopramide  5 mg IntraVENous BID    aspirin  81 mg Oral Daily    atorvastatin  10 mg Oral Nightly    [Held by provider] bumetanide  4 mg Oral BID AC    escitalopram  20 mg Oral QAM    gabapentin  200 mg Oral TID    hydrALAZINE  100 mg Oral Q8H    insulin glargine  20 Units SubCUTAneous Daily    labetalol  600 mg Oral TID    NIFEdipine  60 mg Oral BID    oxybutynin  10 mg Oral QAM    scopolamine  1 patch TransDERmal Q72H    sodium chloride flush  5-40 mL IntraVENous 2 times per day    heparin (porcine)  5,000 Units SubCUTAneous 3 times per day    insulin lispro  0-18 Units SubCUTAneous TID     insulin lispro  0-9 Units SubCUTAneous Nightly     Continuous Infusions:    niCARdipine Stopped (02/02/22 1326)    dextrose 5 % and 0.9 % NaCl 30 mL/hr at 02/02/22 1135    sodium chloride      dextrose       PRN Meds: ondansetron, albumin human, morphine **OR** morphine, anticoagulant sodium citrate, anticoagulant sodium citrate, cloNIDine, sennosides-docusate sodium, sodium chloride flush, sodium chloride, polyethylene glycol, acetaminophen **OR** acetaminophen, LORazepam, glucose, glucagon (rDNA), dextrose, dextrose bolus (hypoglycemia) **OR** dextrose bolus (hypoglycemia), labetalol    Data:     Past Medical History:   has a past medical history of Diabetes mellitus ([facility] [address] [facility] and Hypertension. Social History:   reports that she has been smoking. She has been smoking about 1.00 pack per day. She has never used smokeless tobacco. She reports current alcohol use of about 6.0 standard drinks of alcohol per week. She reports that she does not use drugs. Family History:   Family History   Problem Relation Age of Onset    No Known Problems Mother     No Known Problems Father        Vitals:  BP (!) 143/83   Pulse 82   Temp 98.7 °F (37.1 °C) (Temporal)   Resp 12   Ht 5' 3\" (1.6 m)   Wt 136 lb 11 oz (62 kg)   LMP  (LMP Unknown)   SpO2 100%   BMI 24.21 kg/m²   Temp (24hrs), Av.9 °F (36.6 °C), Min:97.1 °F (36.2 °C), Max:98.7 °F (37.1 °C)    Recent Labs     22  1719 22  2030 22  0834 22  1152   POCGLU 130* 165* 137* 151*       I/O (24Hr):     Intake/Output Summary (Last 24 hours) at 2022 1430  Last data filed at 2/3/2022 2100  Gross per 24 hour   Intake --   Output 100 ml   Net -100 ml       Labs:  Hematology:  Recent Labs     22  0322 22  1112   WBC 5.8 6.8   RBC 4.39 3.88*   HGB 12.3 10.8*   HCT 39.8 35.3*   MCV 90.7 91.0   MCH 28.0 27.8   MCHC 30.9 30.6   RDW 16.1* 15.7*    135*   MPV 10.8 11.0     Chemistry:  Recent Labs     22  0351 22  0322 22  1112    135 135   K 3.4* 3.7 4.3    103 102   CO2 22 21 22   GLUCOSE 132* 151* 178*   BUN 18 22* 16   CREATININE 3.35* 4.61* 3.56*   MG 1.9  --   --    ANIONGAP 11 11 11   LABGLOM 15* 10* 14*   GFRAA 18* 12* 16*   CALCIUM 8.7 8.4* 8.0*   PHOS 4.2 4.6*  -- Recent Labs     02/02/22  0351 02/02/22  0738 02/02/22 2036 02/03/22  0322 02/03/22  0743 02/03/22  1123 02/03/22  1719 02/03/22  2030 02/04/22  0834 02/04/22  1152   PROT 5.8*  --   --  5.8*  --   --   --   --   --   --    LABALBU 3.2*  --   --  3.1*  --   --   --   --   --   --    AST 18  --   --  12  --   --   --   --   --   --    ALT 17  --   --  14  --   --   --   --   --   --    ALKPHOS 92  --   --  89  --   --   --   --   --   --    BILITOT 0.17*  --   --  0.24*  --   --   --   --   --   --    BILIDIR 0.10  --   --   --   --   --   --   --   --   --    POCGLU  --    < >   < >  --  156* 141* 130* 165* 137* 151*    < > = values in this interval not displayed. Radiology:  XR ABDOMEN (KUB) (SINGLE AP VIEW)    Result Date: 2/2/2022  No abnormalities demonstrated. XR CHEST PORTABLE    Result Date: 2/2/2022  New left subclavian central line, with satisfactory tip position and no pneumothorax. No other interval change. XR CHEST PORTABLE    Result Date: 2/2/2022  Mild cardiomegaly. Right basilar atelectasis or infiltrate. XR CHEST PORTABLE    Result Date: 1/31/2022  No acute findings.        Physical Examination:        General appearance:  alert, cooperative and no distress  Mental Status:  oriented to person, place and time and normal affect  Lungs:  clear to auscultation bilaterally, normal effort  Heart:  regular rate and rhythm, no murmur  Abdomen:  soft, nontender, nondistended, normal bowel sounds   Extremities:  no edema, redness, tenderness in the calves  Skin:  no gross lesions, rashes, induration    Assessment:        Hospital Problems           Last Modified POA    * (Principal) Hypertensive crisis 1/31/2022 Yes    Colitis 1/31/2022 Yes    Epigastric pain 1/31/2022 Yes    Overview Signed 12/1/2021  7:05 PM by ENEIDA Patel NP     Formatting of this note might be different from the original.  Added automatically from request for surgery [id number]         GERD (gastroesophageal reflux disease) 1/31/2022 Yes    Dyslipidemia, goal LDL below 70 1/31/2022 Yes    Uncontrolled type 2 diabetes mellitus ([facility] [address] 1/31/2022 Yes    Uncontrolled hypertension 2/2/2022 Yes    ESRD needing dialysis ([facility] [address] 1/31/2022 Yes    Tobacco use 1/31/2022 Yes    Troponin level elevated 1/31/2022 Yes    [facility] 2/4/2022 Yes                Plan:      -GI symptoms much improved today. Encourage follow-up with [facility]. May benefit from gastric pacemaker.  - plan to d/c to home after HD today.      Susan Khalil MD  2/4/2022

## 2022-02-04 NOTE — PROGRESS NOTES
HEMODIALYSIS POST TREATMENT NOTE    Treatment time ordered: 3.5Hrs   Actual treatment time: 3.5Hrs    UltraFiltration Goal: 1Kg  UltraFiltration Removed: 1Kg      Pre Treatment weight: 62Kgs  Post Treatment weight: 61Kgs  Estimated Dry Weight: 61Kgs    Access used:     Central Venous Catheter:          Tunneled or Non-tunneled: Tunneled           Site: R Chest          Access Flow: Good      Internal Access:       AV Fistula or AV Graft: N/A         Site: N/A       Access Flow: N/A       Sign and symptoms of infection: No       If YES: N/A    Medications or blood products given: N/A    Chronic outpatient schedule: MWF    Chronic outpatient unit: [facility]    Summary of response to treatment: Tolerated well with no issues throughout treatment. Tolerated 1kg fluid removal and patient is now at her EDW of 61Kgs. Explain if orders NOT met, was physician notified:N/A      ACES flowsheet faxed to patient unit/ placed in patient chart: yes    Post assessment completed: yes by Jimmy Glynn RN    Report given to: Colleen      * Intra-treatment documented Safety Checks include the followin) Access and face visible at all times. 2) All connections and blood lines are secure with no kinks. 3) NVL alarm engaged. 4) Hemosafe device applied (if applicable). 5) No collapse of Arterial or Venous blood chambers. 6) All blood lines / pump segments in the air detectors.

## 2022-02-04 NOTE — PLAN OF CARE
Problem: Pain:  Goal: Control of acute pain  Description: Control of acute pain  Outcome: Ongoing  Note: Pt denies pain this shift. Problem: Nausea/Vomiting:  Goal: Absence of nausea/vomiting  Description: Absence of nausea/vomiting  Outcome: Ongoing  Note: Pt verbalizes persistance of nausea despite interventions this shift. Goal: Able to drink  Description: Able to drink  Outcome: Ongoing  Note: Pt tolerating small sips of liquids this shift. Goal: Able to eat  Description: Able to eat  Outcome: Ongoing  Note: Pt refusing to eat this shift due to nausea. Problem: Falls - Risk of:  Goal: Will remain free from falls  Description: Will remain free from falls  Outcome: Ongoing  Note: Pt free from falls, fall risk education reinforced this shift.
Problem: Pain:  Goal: Pain level will decrease  Description: Pain level will decrease  2/4/2022 0326 by Sofia Márquez RN  Outcome: Ongoing     Problem: Pain:  Goal: Control of acute pain  Description: Control of acute pain  2/4/2022 0326 by Sofia Márquez RN  Outcome: Ongoing     Problem: Pain:  Goal: Control of chronic pain  Description: Control of chronic pain  2/4/2022 0326 by Sofia Márquez RN  Outcome: Ongoing     Problem: Nausea/Vomiting:  Goal: Absence of nausea/vomiting  Description: Absence of nausea/vomiting  2/4/2022 0326 by Sofia Márquez RN  Outcome: Ongoing     Problem: Nausea/Vomiting:  Goal: Able to drink  Description: Able to drink  2/4/2022 0326 by Sofia Márquez RN  Outcome: Ongoing     Problem: Nausea/Vomiting:  Goal: Able to eat  Description: Able to eat  2/4/2022 0326 by Sofia Márquez RN  Outcome: Ongoing     Problem: Nausea/Vomiting:  Goal: Ability to achieve adequate nutritional intake will improve  Description: Ability to achieve adequate nutritional intake will improve  2/4/2022 0326 by Sofia Márquez RN  Outcome: Ongoing     Problem: Falls - Risk of:  Goal: Will remain free from falls  Description: Will remain free from falls  2/4/2022 0326 by Sofia Márquez RN  Outcome: Ongoing     Problem: Falls - Risk of:  Goal: Absence of physical injury  Description: Absence of physical injury  2/4/2022 0326 by Sofia Márquez RN  Outcome: Ongoing     Problem: Skin Integrity:  Goal: Will show no infection signs and symptoms  Description: Will show no infection signs and symptoms  2/4/2022 0326 by Sofia Márquez RN  Outcome: Ongoing     Problem: Skin Integrity:  Goal: Absence of new skin breakdown  Description: Absence of new skin breakdown  2/4/2022 0326 by Sofia Márquez RN  Outcome: Ongoing
Problem: Pain:  Goal: Pain level will decrease  Description: Pain level will decrease  Outcome: Ongoing     Problem: Pain:  Goal: Control of acute pain  Description: Control of acute pain  Outcome: Ongoing     Problem: Pain:  Goal: Control of chronic pain  Description: Control of chronic pain  Outcome: Ongoing     Problem: Nausea/Vomiting:  Goal: Absence of nausea/vomiting  Description: Absence of nausea/vomiting  Outcome: Ongoing     Problem: Falls - Risk of:  Goal: Will remain free from falls  Description: Will remain free from falls  Outcome: Ongoing     Problem: Falls - Risk of:  Goal: Absence of physical injury  Description: Absence of physical injury  Outcome: Ongoing     Problem: Skin Integrity:  Goal: Will show no infection signs and symptoms  Description: Will show no infection signs and symptoms  Outcome: Ongoing     Problem: Skin Integrity:  Goal: Absence of new skin breakdown  Description: Absence of new skin breakdown  Outcome: Ongoing
Problem: Pain:  Goal: Pain level will decrease  Description: Pain level will decrease  Outcome: Ongoing     Problem: Pain:  Goal: Control of acute pain  Description: Control of acute pain  Outcome: Ongoing     Problem: Pain:  Goal: Control of chronic pain  Description: Control of chronic pain  Outcome: Ongoing     Problem: Nausea/Vomiting:  Goal: Absence of nausea/vomiting  Description: Absence of nausea/vomiting  Outcome: Ongoing     Problem: Nausea/Vomiting:  Goal: Able to drink  Description: Able to drink  Outcome: Ongoing     Problem: Nausea/Vomiting:  Goal: Able to eat  Description: Able to eat  Outcome: Ongoing     Problem: Falls - Risk of:  Goal: Will remain free from falls  Description: Will remain free from falls  Outcome: Ongoing     Problem: Falls - Risk of:  Goal: Absence of physical injury  Description: Absence of physical injury  Outcome: Ongoing     Problem: Skin Integrity:  Goal: Will show no infection signs and symptoms  Description: Will show no infection signs and symptoms  Outcome: Ongoing     Problem: Skin Integrity:  Goal: Absence of new skin breakdown  Description: Absence of new skin breakdown  Outcome: Ongoing
Problem: Pain:  Goal: Pain level will decrease  Description: Pain level will decrease  Outcome: Ongoing  Goal: Control of acute pain  Description: Control of acute pain  2/1/2022 0944 by Lalit Alfonso RN  Outcome: Ongoing  1/31/2022 2113 by Elsa Singh RN  Outcome: Ongoing  Note: Pt denies pain this shift. Goal: Control of chronic pain  Description: Control of chronic pain  Outcome: Ongoing     Problem: Nausea/Vomiting:  Goal: Absence of nausea/vomiting  Description: Absence of nausea/vomiting  2/1/2022 0944 by Lalit Alfonso RN  Outcome: Ongoing  1/31/2022 2113 by Elsa Singh RN  Outcome: Ongoing  Note: Pt verbalizes persistance of nausea despite interventions this shift. Goal: Able to drink  Description: Able to drink  2/1/2022 0944 by Lalit Alfonso RN  Outcome: Ongoing  1/31/2022 2113 by Elsa Singh RN  Outcome: Ongoing  Note: Pt tolerating small sips of liquids this shift. Goal: Able to eat  Description: Able to eat  2/1/2022 0944 by Lalit Alfonso RN  Outcome: Ongoing  1/31/2022 2113 by Elsa Singh RN  Outcome: Ongoing  Note: Pt refusing to eat this shift due to nausea. Goal: Ability to achieve adequate nutritional intake will improve  Description: Ability to achieve adequate nutritional intake will improve  Outcome: Ongoing     Problem: Falls - Risk of:  Goal: Will remain free from falls  Description: Will remain free from falls  2/1/2022 0944 by Lalit Alfonso RN  Outcome: Ongoing  1/31/2022 2113 by Elsa Singh RN  Outcome: Ongoing  Note: Pt free from falls, fall risk education reinforced this shift.   Goal: Absence of physical injury  Description: Absence of physical injury  Outcome: Ongoing
Problem: Pain:  Goal: Pain level will decrease  Description: Pain level will decrease  Outcome: Ongoing  Goal: Control of acute pain  Description: Control of acute pain  Outcome: Ongoing  Goal: Control of chronic pain  Description: Control of chronic pain  Outcome: Ongoing     Problem: Nausea/Vomiting:  Goal: Absence of nausea/vomiting  Description: Absence of nausea/vomiting  Outcome: Ongoing  Goal: Able to drink  Description: Able to drink  Outcome: Ongoing  Goal: Able to eat  Description: Able to eat  Outcome: Ongoing  Goal: Ability to achieve adequate nutritional intake will improve  Description: Ability to achieve adequate nutritional intake will improve  Outcome: Ongoing     Problem: Falls - Risk of:  Goal: Will remain free from falls  Description: Will remain free from falls  Outcome: Ongoing  Goal: Absence of physical injury  Description: Absence of physical injury  Outcome: Ongoing     Problem: Skin Integrity:  Goal: Will show no infection signs and symptoms  Description: Will show no infection signs and symptoms  Outcome: Ongoing  Goal: Absence of new skin breakdown  Description: Absence of new skin breakdown  Outcome: Ongoing
conjunctivae normal  ENT: hearing grossly normal bilaterally  Neck: neck supple and non tender without mass, no thyromegaly or thyroid nodules, no cervical lymphadenopathy   Pulmonary/Chest: clear to auscultation bilaterally- no wheezes, rales or rhonchi, normal air movement, no respiratory distress  Cardiovascular: normal rate, regular rhythm, normal S1 and S2, no murmurs, rubs, clicks or gallops, distal pulses intact, no carotid bruits  Abdomen: soft, obese non tender, non-distended, normal bowel sounds, no masses or organomegaly no ascites  Extremities: no cyanosis, clubbing or edema  Musculoskeletal: normal range of motion, no joint swelling, deformity or tenderness  Neurologic: no cranial nerve deficit and muscle strength normal    Assessment  Abdominal pain with nausea and emesis, hx diabetic gastroparesis hx chronic colitis? ? Hypertensive crisis  Marijuana abuse  esrd on hd    Plan  Will d/w md cantu  lft  reglan iv bid  zofran q 4 hours atc  ppi bid iv  Phenergan prn  Stop marijuana  Nephrology following  Formal gi consult to follow  . Sergey Kirby, APRN - CNP

## 2022-02-04 NOTE — PROGRESS NOTES
HEMODIALYSIS PRE-TREATMENT NOTE    Patient Identifiers prior to treatment: Name//MRN    Isolation Required: No                      Isolation Type: N/A       (please document if patient is being managed as a PUI/COVID-19 patient)        Hepatitis status:                           Date Drawn                             Result  Hepatitis B Surface Antigen 2022     NEG                     Hepatitis B Surface Antibody 2022 NEG        Hepatitis B Core Antibody 2021 NEG          How was Hepatitis Status verified: Outpatient labs     Was a copy of the labs you documented provided to facility for the patient's chart: yes    Hemodialysis orders verified: yes    Access Within normal limits ( I.e. s/s of infection,...): yes     Pre-Assessment completed: yes by Andreia Klein RN    Pre-dialysis report received from: Colleen                      Time: 11:50

## 2022-02-04 NOTE — PROGRESS NOTES
[facility]    Gastroenterology Daily Progress Note      Patient:   Raza Rodriguez   :    1970   Facility:   [facility]  Date:     2022  Consultant:   ENEIDA Crane CNP, CNP      SUBJECTIVE  49 y.o. female admitted 2022 with ESRD needing dialysis ([facility] [address] [N18.6, Z99.2]  Uncontrolled hypertension [I10] and seen for abdominal pain with nausea and emesis. The pt was seen and examined. Tolerating a diet, taking her oral meds with no abdominal pain or nausea.  .        OBJECTIVE  Scheduled Meds:   pantoprazole  40 mg IntraVENous BID    metoclopramide  5 mg IntraVENous BID    ondansetron  4 mg IntraVENous 6 times per day    aspirin  81 mg Oral Daily    atorvastatin  10 mg Oral Nightly    [Held by provider] bumetanide  4 mg Oral BID AC    escitalopram  20 mg Oral QAM    gabapentin  200 mg Oral TID    hydrALAZINE  100 mg Oral Q8H    insulin glargine  20 Units SubCUTAneous Daily    labetalol  600 mg Oral TID    NIFEdipine  60 mg Oral BID    oxybutynin  10 mg Oral QAM    scopolamine  1 patch TransDERmal Q72H    sodium chloride flush  5-40 mL IntraVENous 2 times per day    heparin (porcine)  5,000 Units SubCUTAneous 3 times per day    insulin lispro  0-18 Units SubCUTAneous TID WC    insulin lispro  0-9 Units SubCUTAneous Nightly       Vital Signs:  /62   Pulse 84   Temp 97.5 °F (36.4 °C) (Temporal)   Resp 10   Ht 5' 3\" (1.6 m)   Wt 136 lb 11.2 oz (62 kg)   LMP  (LMP Unknown)   SpO2 99%   BMI 24.22 kg/m²      Physical Exam:     General Appearance: alert and oriented to person, place and time, well-developed and well-nourished, in no acute distress  Skin: warm and dry, no rash or erythema  Head: normocephalic and atraumatic  Eyes: pupils equal, round, and reactive to light, extraocular eye movements intact, conjunctivae normal  ENT: hearing grossly normal bilaterally  Neck: neck supple and non tender without mass, no thyromegaly or thyroid nodules, no cervical lymphadenopathy   Pulmonary/Chest: clear to auscultation bilaterally- no wheezes, rales or rhonchi, normal air movement, no respiratory distress  Cardiovascular: normal rate, regular rhythm, normal S1 and S2, no murmurs, rubs, clicks or gallops, distal pulses intact, no carotid bruits  Abdomen: soft, non-tender, non-distended, normal bowel sounds, no masses or organomegaly  Extremities: no cyanosis, clubbing or edema  Musculoskeletal: normal range of motion, no joint swelling, deformity or tenderness  Neurologic: no cranial nerve deficit and muscle strength normal    Lab and Imaging Review     CBC  Recent Labs     02/03/22  0322   WBC 5.8   HGB 12.3   HCT 39.8   MCV 90.7          BMP  Recent Labs     02/02/22  0351 02/03/22  0322    135   K 3.4* 3.7    103   CO2 22 21   BUN 18 22*   CREATININE 3.35* 4.61*   GLUCOSE 132* 151*   CALCIUM 8.7 8.4*       LFTS  Recent Labs     02/02/22  0351 02/03/22  0322   ALKPHOS 92 89   ALT 17 14   AST 18 12   PROT 5.8* 5.8*   BILITOT 0.17* 0.24*   BILIDIR 0.10  --    LABALBU 3.2* 3.1*       FINDINGS:kub 2/22/22   Bowel gas pattern is unremarkable. Kreg Bull is no evidence for bowel   distention.  There is no evidence of organomegaly or ascites. Kreg Bull is a   phlebolith in the pelvis.  Bony structures are unremarkable.           Impression   No abnormalities demonstrated. ASSESSMENT/plan  1. Abdominal pain with nausea and emesis, hx diabetic gastroparesis, improved lft normal  -continue ppi and reglan bid while admitted, do not recommend reglan at discharge due to side effects, will need to f/u with her regular gi md as outpt  Will change zofran to prn  -needs 6 small meals per day  -strict glycemic control    D/w md gi signing off      This plan was formulated in collaboration with  .     Electronically signed by: ENEIDA Leung CNP on 2/4/2022 at 7:26 AM       Attending Physician Statement  I have discussed the care of Catherine Zulay Shaikh and   I have examined the patient myselft independently, and taken ros and hpi , including pertinent history and exam findings,  with the author of this note . I have reviewed the key elements of all parts of the encounter with the nurse practitioner/resident.     I agree with the assessment, plan and orders as documented by the above health care provider       Patient is being discharged  Asymptomatic with the medication  Follow-up with us as an outpatient  Electronically signed by Raysa Diaz MD

## 2022-02-04 NOTE — DISCHARGE SUMMARY
[facility]  Office: [address], DO, Larisa March, DO, Imtiaz Betsy, DO, Bryce Tiffanie Blood, DO, Sarahi Loya MD, Poli Lemus MD, Marietta Diaz MD, Ollie Ventura MD, Humera Sawant MD, Don Black MD, Gomez Amaral MD, Angela See, DO, Shoshana White, DO, Serge Villarreal MD,  Collette Mendoza, DO, Lucille Monroe MD, Madyson Zayas MD, Lizet Weller MD, Lizzy Alvarez MD, Oscar Hackett MD, [place], [facility], [facility], CNP, Giovani Lopez, CNP, [facility], CNS, Angel Jenkins, CNP, Essence Huitron, CNP, Tanya Bass, CNP, Pool Voss, CNP, Rosa Francis, CNP, Pollo Tran PA-C, [facility], Claudette Anis, [facility], Julio Bonilla, CNP, Wallace Landaverde, CNP, [facility], CNP, Harman Choi, CNP, Perla Christie, [facility], Sirisha Humphrey, [facility]    Discharge Summary     Patient ID: David Goodrich  :  1970   MRN: [id number]     ACCOUNT:  [de-identified]   Patient's PCP: ENEIDA Augustin CNP  Admit Date: 2022   Discharge Date: 2022  Length of Stay: 4  Code Status:  Full Code  Admitting Physician: Humera Sawant MD  Discharge Physician: Humera Sawant MD     Active Discharge Diagnoses:     Hospital Problem Lists:  Principal Problem:    Hypertensive crisis  Active Problems:    Colitis    Epigastric pain    GERD (gastroesophageal reflux disease)    Dyslipidemia, goal LDL below 70    Uncontrolled type 2 diabetes mellitus ([facility] [address]    Uncontrolled hypertension    ESRD needing dialysis ([facility] [address]    Tobacco use    Troponin level elevated    Gastroparesis due to DM [facility])  Resolved Problems:    * No resolved hospital problems. *      Admission Condition:  fair   Discharged Condition: good    Hospital Stay:     Hospital Course:    52-year-old with end-stage renal disease on hemodialysis, gastroparesis presented to ED with complaints of increased abdominal discomfort and nausea persistent vomiting.   She missed her dialysis secondary to GI symptoms. She was found to be in hypertensive urgency for which she was started on Cardene drip with symptomatic control of nausea and vomiting . GI was consulted for further help since patient symptoms were persistent to IV hydration, Zofran Phenergan and Reglan. Patient subsequently improved with continued therapy. Underwent hemodialysis under nephrology recommendations. She is advised for a close followup with her GI physicians outpatient. Radiology:  XR ABDOMEN (KUB) (SINGLE AP VIEW)    Result Date: 2/2/2022  No abnormalities demonstrated. XR CHEST PORTABLE    Result Date: 2/2/2022  New left subclavian central line, with satisfactory tip position and no pneumothorax. No other interval change. XR CHEST PORTABLE    Result Date: 2/2/2022  Mild cardiomegaly. Right basilar atelectasis or infiltrate. XR CHEST PORTABLE    Result Date: 1/31/2022  No acute findings. Consultations:    Consults:     Final Specialist Recommendations/Findings:   IP CONSULT TO HOSPITALIST  IP CONSULT TO NEPHROLOGY  IP CONSULT TO GI      The patient was seen and examined on day of discharge and this discharge summary is in conjunction with any daily progress note from day of discharge.     Discharge plan:     Disposition: Home    Physician Follow Up:   [address]  [phone]    Schedule an appointment as soon as possible for a visit in 1 week  hospital f/u      Diet: renal diet    Activity: As tolerated    Discharge Medications:      Medication List      START taking these medications    metoclopramide 5 MG tablet  Commonly known as: REGLAN  Take 1 tablet by mouth 4 times daily for 7 days        CONTINUE taking these medications    aspirin 81 MG EC tablet     atorvastatin 10 MG tablet  Commonly known as: LIPITOR     bumetanide 2 MG tablet  Commonly known as: BUMEX     cetirizine 10 MG tablet  Commonly known as: ZYRTEC cloNIDine 0.1 MG tablet  Commonly known as: CATAPRES     ferrous sulfate 325 (65 Fe) MG EC tablet  Commonly known as: FE TABS 325     gabapentin 100 MG capsule  Commonly known as: NEURONTIN     hydrALAZINE 100 MG tablet  Commonly known as: APRESOLINE     insulin glargine 100 UNIT/ML injection vial  Commonly known as: LANTUS     insulin lispro 100 UNIT/ML injection vial  Commonly known as: HUMALOG     labetalol 300 MG tablet  Commonly known as: NORMODYNE     Lexapro 20 MG tablet  Generic drug: escitalopram     magnesium oxide 400 MG tablet  Commonly known as: MAG-OX     NIFEdipine 60 MG extended release tablet  Commonly known as: PROCARDIA XL     ondansetron 4 MG disintegrating tablet  Commonly known as: ZOFRAN-ODT     oxybutynin 10 MG extended release tablet  Commonly known as: DITROPAN-XL     pantoprazole 40 MG tablet  Commonly known as: PROTONIX     daly-pedro Tabs     sennosides-docusate sodium 8.6-50 MG tablet  Commonly known as: SENOKOT-S     vitamin D 1.25 MG (22464 UT) Caps capsule  Commonly known as: ERGOCALCIFEROL           Where to Get Your Medications      These medications were sent to [address] [place] [address], [address] [place] [id number]    Phone: [phone]   metoclopramide 5 MG tablet         Discharge Procedure Orders   External Referral To Gastroenterology   Referral Priority: Routine Referral Type: Consult for Advice and Opinion   Referral Reason: Specialty Services Required   Referred to Provider: Elayne Muhammad Specialty: Gastroenterology   Number of Visits Requested: 1       Time Spent on discharge is  35 mins in patient examination, evaluation, counseling as well as medication reconciliation, prescriptions for required medications, discharge plan and follow up.     Electronically signed by   Sandy Edgar MD  2/4/2022  2:55 PM      Thank you Dr. Jimmy Nichols, APRN - CNP for the opportunity to be involved in this patient's care.

## 2022-02-04 NOTE — PROGRESS NOTES
Renal Progress Note    Patient :  Kerrie Tran; 49 y.o. MRN# [id number]  Location:  2033/2033-01  Attending:  Karla Hughes MD  Admit Date:  1/31/2022   Hospital Day: 4      Subjective:       Patient was seen and examined on HD. Tolerating the procedure well. No new issues reported overnight. Nausea or vomiting reported seems to be doing better. Tolerating oral diet okay. Patient did have regular dialysis yesterday ran for 202 minutes and got about 800 cc removed. There was confused meant of patient's regular hemodialysis days, I called patient's hemodialysis unit [facility] and confirmed that she is on MWF schedule. She does have history of some noncompliance but normally runs on MWF schedule. Will dialyze her today to bring her back to her regular schedule so that she can be discharged postdialysis if needed. Outpatient Medications:     Medications Prior to Admission: labetalol (NORMODYNE) 300 MG tablet, Take 600 mg by mouth 3 times daily Takes 2 tabs (=600mg) TID  hydrALAZINE (APRESOLINE) 100 MG tablet, Take 100 mg by mouth every 8 hours  NIFEdipine (PROCARDIA XL) 60 MG extended release tablet, Take 60 mg by mouth 2 times daily  bumetanide (BUMEX) 2 MG tablet, Take 4 mg by mouth 2 times daily (before meals) Takes 2 tabs (=4mg) BID before meals  oxybutynin (DITROPAN-XL) 10 MG extended release tablet, Take 10 mg by mouth every morning  escitalopram (LEXAPRO) 20 MG tablet, Take 20 mg by mouth every morning  ferrous sulfate (FE TABS 325) 325 (65 Fe) MG EC tablet, Take 325 mg by mouth every morning  atorvastatin (LIPITOR) 10 MG tablet, Take 10 mg by mouth nightly   aspirin 81 MG EC tablet, Take 81 mg by mouth daily  gabapentin (NEURONTIN) 100 MG capsule, Take 200 mg by mouth 3 times daily.  Takes 2 caps (=200mg) TID  pantoprazole (PROTONIX) 40 MG tablet, Take 40 mg by mouth 2 times daily (before meals)   vitamin D (ERGOCALCIFEROL) 1.25 MG (92806 UT) CAPS capsule, Take 50,000 Units by mouth once a week  B Complex-C-Folic Acid (BRITNI-MARGARITO) TABS, Take 1 tablet by mouth every morning  cloNIDine (CATAPRES) 0.1 MG tablet, Take 0.1 mg by mouth every 6 hours as needed for High Blood Pressure (SBP>165)  ondansetron (ZOFRAN-ODT) 4 MG disintegrating tablet, Take 4 mg by mouth every 8 hours as needed for Nausea or Vomiting  cetirizine (ZYRTEC) 10 MG tablet, Take 10 mg by mouth daily  magnesium oxide (MAG-OX) 400 MG tablet, Take 400 mg by mouth 3 times daily  sennosides-docusate sodium (SENOKOT-S) 8.6-50 MG tablet, Take 2 tablets by mouth daily as needed for Constipation  insulin lispro (HUMALOG) 100 UNIT/ML injection vial, Inject into the skin 4 times daily (with meals and nightly) Indications: sliding scale  insulin glargine (LANTUS) 100 UNIT/ML injection vial, Inject 20 Units into the skin daily     Current Medications:     Scheduled Meds:    pantoprazole  40 mg IntraVENous BID    metoclopramide  5 mg IntraVENous BID    ondansetron  4 mg IntraVENous 6 times per day    aspirin  81 mg Oral Daily    atorvastatin  10 mg Oral Nightly    [Held by provider] bumetanide  4 mg Oral BID AC    escitalopram  20 mg Oral QAM    gabapentin  200 mg Oral TID    hydrALAZINE  100 mg Oral Q8H    insulin glargine  20 Units SubCUTAneous Daily    labetalol  600 mg Oral TID    NIFEdipine  60 mg Oral BID    oxybutynin  10 mg Oral QAM    scopolamine  1 patch TransDERmal Q72H    sodium chloride flush  5-40 mL IntraVENous 2 times per day    heparin (porcine)  5,000 Units SubCUTAneous 3 times per day    insulin lispro  0-18 Units SubCUTAneous TID     insulin lispro  0-9 Units SubCUTAneous Nightly     Continuous Infusions:    niCARdipine Stopped (02/02/22 2137)    dextrose 5 % and 0.9 % NaCl 30 mL/hr at 02/02/22 1135    sodium chloride      dextrose       PRN Meds:  albumin human, morphine **OR** morphine, anticoagulant sodium citrate, anticoagulant sodium citrate, cloNIDine, sennosides-docusate sodium, sodium chloride flush, sodium chloride, polyethylene glycol, acetaminophen **OR** acetaminophen, LORazepam, glucose, glucagon (rDNA), dextrose, dextrose bolus (hypoglycemia) **OR** dextrose bolus (hypoglycemia), labetalol    Input/Output:       I/O last 3 completed shifts: In: 360 [I.V.:360]  Out: 400 [Urine:400]. Patient Vitals for the past 96 hrs (Last 3 readings):   Weight   22 0600 136 lb 11.2 oz (62 kg)   22 0100 126 lb 9.6 oz (57.4 kg)       Vital Signs:   Temperature:  Temp: 98.2 °F (36.8 °C)  TMax:   Temp (24hrs), Av.1 °F (36.7 °C), Min:97.1 °F (36.2 °C), Max:98.8 °F (37.1 °C)    Respirations:  Resp: 9  Pulse:   Pulse: 87  BP:    BP: (!) 163/83  BP Range: Systolic (79FPX), KZZ:979 , Min:115 , EPB:994       Diastolic (45EOX), VXJ:37, Min:54, Max:139      Physical Examination:     General:  AAO x 3, speaking in full sentences, no accessory muscle use. HEENT: Atraumatic, normocephalic, no throat congestion, moist mucosa. Eyes:   Pupils equal, round and reactive to light, EOMI. Neck:   Supple  Chest:   Bilateral vesicular breath sounds, no rales or wheezes. Cardiac:  S1 S2 RR, no murmurs, gallops or rubs. Abdomen: Soft, non-tender, no masses or organomegaly, BS audible. :   No suprapubic or flank tenderness. Neuro:  AAO x 3, No FND. SKIN:  No rashes, good skin turgor. Extremities:  No edema.     Labs:       Recent Labs     22  0322   WBC 5.8   RBC 4.39   HGB 12.3   HCT 39.8   MCV 90.7   MCH 28.0   MCHC 30.9   RDW 16.1*      MPV 10.8      BMP:   Recent Labs     22  0351 22  0322    135   K 3.4* 3.7    103   CO2 22 21   BUN 18 22*   CREATININE 3.35* 4.61*   GLUCOSE 132* 151*   CALCIUM 8.7 8.4*      Phosphorus:     Recent Labs     22  0351 22  0322   PHOS 4.2 4.6*     Magnesium:    Recent Labs     22  035   MG 1.9     Albumin:    Recent Labs     22  0351 22  032   LABALBU 3.2* 3.1*     SPEP:  Lab Results   Component Value Date    PROT 5.8 02/03/2022     Urinalysis/Chemistries:      Lab Results   Component Value Date    NITRU NEGATIVE 07/15/2019    COLORU YELLOW 07/15/2019    PHUR 7.5 07/15/2019    WBCUA 0 TO 2 07/15/2019    RBCUA 0 TO 2 07/15/2019    MUCUS NOT REPORTED 07/15/2019    TRICHOMONAS NOT REPORTED 07/15/2019    YEAST NOT REPORTED 07/15/2019    BACTERIA FEW 07/15/2019    SPECGRAV 1.010 07/15/2019    LEUKOCYTESUR NEGATIVE 07/15/2019    UROBILINOGEN Normal 07/15/2019    BILIRUBINUR NEGATIVE 07/15/2019    GLUCOSEU 3+ 07/15/2019    KETUA NEGATIVE 07/15/2019    AMORPHOUS NOT REPORTED 07/15/2019     Radiology:     Reviewed. Assessment:     1. ESRD on HD on MWF schedule at [address] under Dr. Ziggy Hager via tunnel catheter. Has a left arm AV fistula which is nonfunctional.  2.  Nausea and vomiting. 3.  Metabolic acidosis-improved. 4.  Diabetic gastroparesis. 5.  Long-term history of diabetes. 6.  Hypertension. Plan:   1. Patient to get HD today and will be switched back to her normal dialysis days of MWF. Orders were confirmed with dialysis nurse. 2.  Gastroparesis management as per primary. 3.  Okay to discharge from nephrology standpoint postdialysis today. Nutrition   Please ensure that patient is on a renal diet/TF. Avoid nephrotoxic drugs/contrast exposure. We will continue to follow along with you. Vadim Abraham Aas, MD  [facility]     This note is created with the assistance of a speech-recognition program. While intending to generate a document that actually reflects the content of the visit, no guarantees can be provided that every mistake has been identified and corrected by editing.   Patient

## 2022-02-05 LAB
CATECHOLAMINE INTERPRETATION, PLASMA: ABNORMAL
EPINEPHRINE PLASMA: <10 PG/ML (ref 10–200)
NOREPINEPHRINE: ABNORMAL PG/ML (ref 80–520)

## 2022-10-27 RX ORDER — CYCLOPENTOLATE HYDROCHLORIDE 10 MG/ML
1 SOLUTION/ DROPS OPHTHALMIC
Status: CANCELLED | OUTPATIENT
Start: 2022-10-27 | End: 2022-10-27

## 2022-10-27 RX ORDER — PHENYLEPHRINE HYDROCHLORIDE 100 MG/ML
1 SOLUTION/ DROPS OPHTHALMIC
Status: CANCELLED | OUTPATIENT
Start: 2022-10-27 | End: 2022-10-27

## 2022-10-27 RX ORDER — ACETAMINOPHEN 500 MG
1000 TABLET ORAL EVERY 6 HOURS PRN
COMMUNITY
Start: 2022-01-11

## 2022-10-27 RX ORDER — TOBRAMYCIN AND DEXAMETHASONE 3; 1 MG/ML; MG/ML
1 SUSPENSION/ DROPS OPHTHALMIC ONCE
Status: CANCELLED | OUTPATIENT
Start: 2022-10-27 | End: 2022-10-27

## 2022-10-27 NOTE — OR NURSING
Pt called for pre-op processing. Pt has continued asa through today. She states she has not yet received instructions from her pcp. Radha BARNARD is no longer her pcp. She will see CAITY Mercado for the first time 12/2022. Left message on Bri Briggs's (Dr. Sherita Guzman surgery scheduler) vm to discuss w/ Dr. Nayely Del Cid. Also I have requested a copy of the clearance , if obtained, from Dr. Sherita Guzman office.

## 2022-10-27 NOTE — OR NURSING
Juanito Ricketts (Dr. Sandra Del Angel surgery scheduler) discussed case w/ Dr. Erika Dominguez. OK to proceed w/ surgery on 11/1/22 without surgical clearance and he is aware that pt is stopping asa on 10/28/2022. Pt did not f/u w/ pcp and did not obtain clearance from pcp or clearance to stop asa from pcp.

## 2022-10-27 NOTE — OR NURSING
Dewayne Holm received previous message and will discuss w/ Dr. Louise Prophet re: asa and medical clearance letter.

## 2022-11-01 ENCOUNTER — ANESTHESIA EVENT (OUTPATIENT)
Dept: OPERATING ROOM | Age: 52
End: 2022-11-01
Payer: MEDICARE

## 2022-11-01 ENCOUNTER — HOSPITAL ENCOUNTER (OUTPATIENT)
Age: 52
Setting detail: OUTPATIENT SURGERY
Discharge: HOME | End: 2022-11-01
Attending: OPHTHALMOLOGY | Admitting: OPHTHALMOLOGY
Payer: MEDICARE

## 2022-11-01 ENCOUNTER — ANESTHESIA (OUTPATIENT)
Dept: OPERATING ROOM | Age: 52
End: 2022-11-01
Payer: MEDICARE

## 2022-11-01 VITALS
OXYGEN SATURATION: 96 % | DIASTOLIC BLOOD PRESSURE: 83 MMHG | WEIGHT: 138 LBS | TEMPERATURE: 98.1 F | HEIGHT: 63 IN | RESPIRATION RATE: 20 BRPM | HEART RATE: 97 BPM | BODY MASS INDEX: 24.45 KG/M2 | SYSTOLIC BLOOD PRESSURE: 176 MMHG

## 2022-11-01 PROBLEM — H33.41 TRACTION RETINAL DETACHMENT INVOLVING MACULA, RIGHT: Chronic | Status: ACTIVE | Noted: 2022-11-01

## 2022-11-01 PROBLEM — H43.11 VITREOUS HEMORRHAGE, RIGHT EYE (HCC): Chronic | Status: ACTIVE | Noted: 2022-11-01

## 2022-11-01 PROBLEM — E11.3599 PROLIFERATIVE DIABETIC RETINOPATHY ASSOCIATED WITH TYPE 2 DIABETES MELLITUS (HCC): Chronic | Status: ACTIVE | Noted: 2022-11-01

## 2022-11-01 LAB
ANION GAP: 10 MMOL/L (ref 7–16)
EGFR, POC: 15 ML/MIN/1.73M2
GLUCOSE BLD-MCNC: 228 MG/DL (ref 74–100)
GLUCOSE BLD-MCNC: 84 MG/DL (ref 65–105)
POC BUN: 33 MG/DL (ref 8–26)
POC CHLORIDE: 105 MMOL/L (ref 98–107)
POC CREATININE: 3.6 MG/DL (ref 0.51–1.19)
POC POTASSIUM: 4.4 MMOL/L (ref 3.5–4.5)
POC SODIUM: 142 MMOL/L (ref 138–146)
POC TCO2: 28 MMOL/L (ref 22–30)

## 2022-11-01 PROCEDURE — 3600000014 HC SURGERY LEVEL 4 ADDTL 15MIN: Performed by: OPHTHALMOLOGY

## 2022-11-01 PROCEDURE — 3700000000 HC ANESTHESIA ATTENDED CARE: Performed by: OPHTHALMOLOGY

## 2022-11-01 PROCEDURE — 6360000002 HC RX W HCPCS: Performed by: NURSE ANESTHETIST, CERTIFIED REGISTERED

## 2022-11-01 PROCEDURE — 6370000000 HC RX 637 (ALT 250 FOR IP): Performed by: OPHTHALMOLOGY

## 2022-11-01 PROCEDURE — 3700000001 HC ADD 15 MINUTES (ANESTHESIA): Performed by: OPHTHALMOLOGY

## 2022-11-01 PROCEDURE — 2500000003 HC RX 250 WO HCPCS: Performed by: OPHTHALMOLOGY

## 2022-11-01 PROCEDURE — 82565 ASSAY OF CREATININE: CPT

## 2022-11-01 PROCEDURE — 6360000002 HC RX W HCPCS: Performed by: OPHTHALMOLOGY

## 2022-11-01 PROCEDURE — C1814 RETINAL TAMP, SILICONE OIL: HCPCS | Performed by: OPHTHALMOLOGY

## 2022-11-01 PROCEDURE — 2720000010 HC SURG SUPPLY STERILE: Performed by: OPHTHALMOLOGY

## 2022-11-01 PROCEDURE — 7100000041 HC SPAR PHASE II RECOVERY - ADDTL 15 MIN: Performed by: OPHTHALMOLOGY

## 2022-11-01 PROCEDURE — 2709999900 HC NON-CHARGEABLE SUPPLY: Performed by: OPHTHALMOLOGY

## 2022-11-01 PROCEDURE — C1784 OCULAR DEV, INTRAOP, DET RET: HCPCS | Performed by: OPHTHALMOLOGY

## 2022-11-01 PROCEDURE — 80051 ELECTROLYTE PANEL: CPT

## 2022-11-01 PROCEDURE — 93005 ELECTROCARDIOGRAM TRACING: CPT | Performed by: ANESTHESIOLOGY

## 2022-11-01 PROCEDURE — 2580000003 HC RX 258: Performed by: ANESTHESIOLOGY

## 2022-11-01 PROCEDURE — 2580000003 HC RX 258: Performed by: OPHTHALMOLOGY

## 2022-11-01 PROCEDURE — 84520 ASSAY OF UREA NITROGEN: CPT

## 2022-11-01 PROCEDURE — 3600000004 HC SURGERY LEVEL 4 BASE: Performed by: OPHTHALMOLOGY

## 2022-11-01 PROCEDURE — 7100000040 HC SPAR PHASE II RECOVERY - FIRST 15 MIN: Performed by: OPHTHALMOLOGY

## 2022-11-01 PROCEDURE — 82947 ASSAY GLUCOSE BLOOD QUANT: CPT

## 2022-11-01 DEVICE — SILIKON™ 1000 (PURIFIED POLYDIMETHYLSILOXANE) IS HIGHLY PURIFIED LONG CHAIN POLYDIMETHYLSILOXANE TRIMETHYLSILOXY TERMINATED SILICONE OIL. IT IS STERILE,NON-PYROGENIC, CLEAR, COLORLESS AND HAS A VISCOSITY OF 1000 CS. FOR USE AS A POST-OPERATIVE RETINAL TAMPONADE DURING VITREORETINAL SURGERY. SILIKON™ 1000IS COMPOSED OF SILICON, OXYGEN, CARBON AND HYDROGEN ATOMS. SILIKON™ 1000 IS IMMISCIBLE WITH AQUEOUS COMPONENTS AND IS RELATIVELY INERT MATERIAL WITHLITTLE BIOLOGICAL TOXICITY POTENTIAL.
Type: IMPLANTABLE DEVICE | Status: FUNCTIONAL
Brand: SILIKON™

## 2022-11-01 RX ORDER — FENTANYL CITRATE 50 UG/ML
25 INJECTION, SOLUTION INTRAMUSCULAR; INTRAVENOUS EVERY 5 MIN PRN
Status: CANCELLED | OUTPATIENT
Start: 2022-11-01

## 2022-11-01 RX ORDER — SODIUM CHLORIDE, SODIUM LACTATE, POTASSIUM CHLORIDE, CALCIUM CHLORIDE 600; 310; 30; 20 MG/100ML; MG/100ML; MG/100ML; MG/100ML
INJECTION, SOLUTION INTRAVENOUS CONTINUOUS
Status: DISCONTINUED | OUTPATIENT
Start: 2022-11-01 | End: 2022-11-01

## 2022-11-01 RX ORDER — DIPHENHYDRAMINE HYDROCHLORIDE 50 MG/ML
12.5 INJECTION INTRAMUSCULAR; INTRAVENOUS
Status: CANCELLED | OUTPATIENT
Start: 2022-11-01 | End: 2022-11-02

## 2022-11-01 RX ORDER — CYCLOPENTOLATE HYDROCHLORIDE 10 MG/ML
SOLUTION/ DROPS OPHTHALMIC PRN
Status: DISCONTINUED | OUTPATIENT
Start: 2022-11-01 | End: 2022-11-01 | Stop reason: ALTCHOICE

## 2022-11-01 RX ORDER — ONDANSETRON 2 MG/ML
4 INJECTION INTRAMUSCULAR; INTRAVENOUS
Status: CANCELLED | OUTPATIENT
Start: 2022-11-01 | End: 2022-11-02

## 2022-11-01 RX ORDER — FENTANYL CITRATE 50 UG/ML
25 INJECTION, SOLUTION INTRAMUSCULAR; INTRAVENOUS
Status: DISCONTINUED | OUTPATIENT
Start: 2022-11-01 | End: 2022-11-01 | Stop reason: HOSPADM

## 2022-11-01 RX ORDER — LOSARTAN POTASSIUM 100 MG/1
TABLET ORAL DAILY
COMMUNITY
Start: 2022-10-05

## 2022-11-01 RX ORDER — FENTANYL CITRATE 50 UG/ML
50 INJECTION, SOLUTION INTRAMUSCULAR; INTRAVENOUS EVERY 5 MIN PRN
Status: CANCELLED | OUTPATIENT
Start: 2022-11-01

## 2022-11-01 RX ORDER — TOBRAMYCIN AND DEXAMETHASONE 3; 1 MG/ML; MG/ML
1 SUSPENSION/ DROPS OPHTHALMIC
Status: COMPLETED | OUTPATIENT
Start: 2022-11-01 | End: 2022-11-01

## 2022-11-01 RX ORDER — PROPOFOL 10 MG/ML
INJECTION, EMULSION INTRAVENOUS PRN
Status: DISCONTINUED | OUTPATIENT
Start: 2022-11-01 | End: 2022-11-01 | Stop reason: SDUPTHER

## 2022-11-01 RX ORDER — BALANCED SALT SOLUTION ENRICHED WITH BICARBONATE, DEXTROSE, AND GLUTATHIONE
KIT INTRAOCULAR PRN
Status: DISCONTINUED | OUTPATIENT
Start: 2022-11-01 | End: 2022-11-01 | Stop reason: ALTCHOICE

## 2022-11-01 RX ORDER — PHENYLEPHRINE HYDROCHLORIDE 100 MG/ML
1 SOLUTION/ DROPS OPHTHALMIC EVERY 5 MIN PRN
Status: COMPLETED | OUTPATIENT
Start: 2022-11-01 | End: 2022-11-01

## 2022-11-01 RX ORDER — CYCLOPENTOLATE HYDROCHLORIDE 10 MG/ML
1 SOLUTION/ DROPS OPHTHALMIC EVERY 5 MIN PRN
Status: COMPLETED | OUTPATIENT
Start: 2022-11-01 | End: 2022-11-01

## 2022-11-01 RX ORDER — DIPHENHYDRAMINE HYDROCHLORIDE 50 MG/ML
INJECTION INTRAMUSCULAR; INTRAVENOUS PRN
Status: DISCONTINUED | OUTPATIENT
Start: 2022-11-01 | End: 2022-11-01 | Stop reason: SDUPTHER

## 2022-11-01 RX ORDER — SODIUM CHLORIDE 0.9 % (FLUSH) 0.9 %
5-40 SYRINGE (ML) INJECTION EVERY 12 HOURS SCHEDULED
Status: DISCONTINUED | OUTPATIENT
Start: 2022-11-01 | End: 2022-11-01 | Stop reason: HOSPADM

## 2022-11-01 RX ORDER — SODIUM CHLORIDE 0.9 % (FLUSH) 0.9 %
5-40 SYRINGE (ML) INJECTION PRN
Status: DISCONTINUED | OUTPATIENT
Start: 2022-11-01 | End: 2022-11-01 | Stop reason: HOSPADM

## 2022-11-01 RX ORDER — MIDAZOLAM HYDROCHLORIDE 2 MG/2ML
1 INJECTION, SOLUTION INTRAMUSCULAR; INTRAVENOUS EVERY 10 MIN PRN
Status: DISCONTINUED | OUTPATIENT
Start: 2022-11-01 | End: 2022-11-01 | Stop reason: HOSPADM

## 2022-11-01 RX ORDER — SODIUM CHLORIDE 0.9 % (FLUSH) 0.9 %
5-40 SYRINGE (ML) INJECTION EVERY 12 HOURS SCHEDULED
Status: CANCELLED | OUTPATIENT
Start: 2022-11-01

## 2022-11-01 RX ORDER — LIDOCAINE HYDROCHLORIDE 10 MG/ML
1 INJECTION, SOLUTION INFILTRATION; PERINEURAL
Status: DISCONTINUED | OUTPATIENT
Start: 2022-11-01 | End: 2022-11-01 | Stop reason: HOSPADM

## 2022-11-01 RX ORDER — PYRIDOXINE HCL (VITAMIN B6) 50 MG
TABLET ORAL DAILY
COMMUNITY
Start: 2022-09-27

## 2022-11-01 RX ORDER — SODIUM CHLORIDE 9 MG/ML
INJECTION, SOLUTION INTRAVENOUS PRN
Status: DISCONTINUED | OUTPATIENT
Start: 2022-11-01 | End: 2022-11-01 | Stop reason: HOSPADM

## 2022-11-01 RX ORDER — SUCRALFATE ORAL 1 G/10ML
SUSPENSION ORAL
COMMUNITY
Start: 2022-09-26

## 2022-11-01 RX ORDER — SODIUM CHLORIDE 9 MG/ML
INJECTION, SOLUTION INTRAVENOUS PRN
Status: CANCELLED | OUTPATIENT
Start: 2022-11-01

## 2022-11-01 RX ORDER — WATER 1000 ML/1000ML
INJECTION, SOLUTION INTRAVENOUS PRN
Status: DISCONTINUED | OUTPATIENT
Start: 2022-11-01 | End: 2022-11-01 | Stop reason: ALTCHOICE

## 2022-11-01 RX ORDER — FENTANYL CITRATE 50 UG/ML
INJECTION, SOLUTION INTRAMUSCULAR; INTRAVENOUS PRN
Status: DISCONTINUED | OUTPATIENT
Start: 2022-11-01 | End: 2022-11-01 | Stop reason: SDUPTHER

## 2022-11-01 RX ORDER — SODIUM CHLORIDE 0.9 % (FLUSH) 0.9 %
5-40 SYRINGE (ML) INJECTION PRN
Status: CANCELLED | OUTPATIENT
Start: 2022-11-01

## 2022-11-01 RX ORDER — FENTANYL CITRATE 50 UG/ML
50 INJECTION, SOLUTION INTRAMUSCULAR; INTRAVENOUS
Status: DISCONTINUED | OUTPATIENT
Start: 2022-11-01 | End: 2022-11-01 | Stop reason: HOSPADM

## 2022-11-01 RX ORDER — SODIUM CHLORIDE 9 MG/ML
INJECTION, SOLUTION INTRAVENOUS CONTINUOUS
Status: DISCONTINUED | OUTPATIENT
Start: 2022-11-01 | End: 2022-11-01 | Stop reason: HOSPADM

## 2022-11-01 RX ORDER — ERYTHROMYCIN 5 MG/G
OINTMENT OPHTHALMIC PRN
Status: DISCONTINUED | OUTPATIENT
Start: 2022-11-01 | End: 2022-11-01 | Stop reason: ALTCHOICE

## 2022-11-01 RX ORDER — CEFTAZIDIME 1 G/1
INJECTION, POWDER, FOR SOLUTION INTRAMUSCULAR; INTRAVENOUS PRN
Status: DISCONTINUED | OUTPATIENT
Start: 2022-11-01 | End: 2022-11-01 | Stop reason: ALTCHOICE

## 2022-11-01 RX ORDER — SODIUM CHLORIDE, SODIUM LACTATE, POTASSIUM CHLORIDE, CALCIUM CHLORIDE 600; 310; 30; 20 MG/100ML; MG/100ML; MG/100ML; MG/100ML
INJECTION, SOLUTION INTRAVENOUS CONTINUOUS
Status: DISCONTINUED | OUTPATIENT
Start: 2022-11-01 | End: 2022-11-01 | Stop reason: HOSPADM

## 2022-11-01 RX ADMIN — PHENYLEPHRINE HYDROCHLORIDE 1 DROP: 100 SOLUTION/ DROPS OPHTHALMIC at 11:10

## 2022-11-01 RX ADMIN — FENTANYL CITRATE 100 MCG: 50 INJECTION, SOLUTION INTRAMUSCULAR; INTRAVENOUS at 14:25

## 2022-11-01 RX ADMIN — TOBRAMYCIN AND DEXAMETHASONE 1 DROP: 3; 1 SUSPENSION/ DROPS OPHTHALMIC at 10:40

## 2022-11-01 RX ADMIN — CYCLOPENTOLATE HYDROCHLORIDE 1 DROP: 10 SOLUTION/ DROPS OPHTHALMIC at 10:50

## 2022-11-01 RX ADMIN — PHENYLEPHRINE HYDROCHLORIDE 1 DROP: 100 SOLUTION/ DROPS OPHTHALMIC at 10:50

## 2022-11-01 RX ADMIN — SODIUM CHLORIDE: 9 INJECTION, SOLUTION INTRAVENOUS at 10:52

## 2022-11-01 RX ADMIN — PHENYLEPHRINE HYDROCHLORIDE 1 DROP: 100 SOLUTION/ DROPS OPHTHALMIC at 10:40

## 2022-11-01 RX ADMIN — CYCLOPENTOLATE HYDROCHLORIDE 1 DROP: 10 SOLUTION/ DROPS OPHTHALMIC at 11:10

## 2022-11-01 RX ADMIN — Medication 50 MG: at 14:07

## 2022-11-01 RX ADMIN — CYCLOPENTOLATE HYDROCHLORIDE 1 DROP: 10 SOLUTION/ DROPS OPHTHALMIC at 10:40

## 2022-11-01 RX ADMIN — PROPOFOL 70 MG: 10 INJECTION, EMULSION INTRAVENOUS at 13:19

## 2022-11-01 ASSESSMENT — PAIN - FUNCTIONAL ASSESSMENT: PAIN_FUNCTIONAL_ASSESSMENT: NONE - DENIES PAIN

## 2022-11-01 ASSESSMENT — PAIN SCALES - GENERAL
PAINLEVEL_OUTOF10: 0

## 2022-11-01 ASSESSMENT — PAIN DESCRIPTION - PAIN TYPE: TYPE: SURGICAL PAIN

## 2022-11-01 ASSESSMENT — LIFESTYLE VARIABLES: SMOKING_STATUS: 1

## 2022-11-01 NOTE — DISCHARGE INSTRUCTIONS
No alcoholic beverages, no driving or operating machinery, no making important decisions for 24 hours. You may have a normal diet but should eat lightly day of surgery. Drink plenty of fluids. Urinate within 8 hours after surgery, if unable to urinate call your doctor   See paper discharge instructions.

## 2022-11-01 NOTE — BRIEF OP NOTE
Brief Postoperative Note      Patient: Kelly Tirado  YOB: 1970  MRN: [id number]    Date of Procedure: 11/1/2022    Pre-Op Diagnosis: VITREOUS HEMORRHAGE    Post-Op Diagnosis: Vitreous Hemorrhage, Traction RD right eye       Procedure(s):  VITRECTOMY 25 GAUGE, MEMBRANE PEEL, ENDOLASER 200MS 200MW 308 SPOTS, AIR FLUID EXCHANGE, SILICONE OIL    Surgeon(s):  Frandy Oneill MD    Assistant:  0    Anesthesia: Monitor Anesthesia Care    Estimated Blood Loss (mL): Minimal    Complications: None    Specimens:   0    Implants:  Implant Name Type Inv.  Item Serial No.  Lot No. LRB No. Used Action   [employer]ENT [employer] 8.5ML CLR POLYDIMETHYLSILOXANE RETIN - [id number]  AGENT VISCOELASTIC 8.5ML CLR POLYDIMETHYLSILOXANE RETIN  [id number] Right 1 Implanted             Electronically signed by Frandy Oneill MD on 11/1/2022 at 2:56 PM

## 2022-11-01 NOTE — H&P
History and Physical    Pt Name: Yuan Iraheta  MRN: [id number]  YOB: 1970  Date of evaluation: 11/1/2022  Primary Care Physician: ENEIDA Meeks - CNP    SUBJECTIVE:   History of Chief Complaint:    Yuan Iraheta is a 47 y.o. female who is scheduled today for VITRECTOMY 25 GAUGE, MEMBRANE PEEL, ENDOLASER - Right. Patient reports she has had blurred vision to he right eye for several months. She denies any floaters or distortion. Patient reports her left eye is also mildly blurred and she is unable to read. She has a history of left eye lasik surgery. Allergies  is allergic to iodinated diagnostic agents and adhesive tape. Medications  Prior to Admission medications    Medication Sig Start Date End Date Taking?  Authorizing Provider   apixaban (ELIQUIS) 2.5 MG TABS tablet Take by mouth 2 times daily 9/26/22  Yes Historical Provider, MD   losartan (COZAAR) 100 MG tablet Take by mouth daily 10/5/22  Yes Historical Provider, MD   pyridoxine (B-6) 50 MG tablet Take by mouth daily 9/27/22  Yes Historical Provider, MD   sucralfate (CARAFATE) 1 GM/10ML suspension Take by mouth 9/26/22  Yes Historical Provider, MD   acetaminophen (TYLENOL) 500 MG tablet Take 1,000 mg by mouth every 6 hours as needed 1/11/22  Yes Historical Provider, MD   metoclopramide (REGLAN) 5 MG tablet Take 1 tablet by mouth 4 times daily for 7 days  Patient taking differently: Take 5 mg by mouth 4 times daily Discontinued 2/4/22 11/1/22  Mariam Avila MD   vitamin D (ERGOCALCIFEROL) 1.25 MG (68797 UT) CAPS capsule Take 50,000 Units by mouth once a week    Historical Provider, MD ROBERSON Complex-C-Folic Acid (BRITNI-MARGARITO) TABS Take 1 tablet by mouth every morning    Historical Provider, MD   cloNIDine (CATAPRES) 0.1 MG tablet Take 0.1 mg by mouth every 6 hours as needed for High Blood Pressure (SBP>165)    Historical Provider, MD   hydrALAZINE (APRESOLINE) 100 MG tablet Take 100 mg by mouth daily    Historical Provider, MD   NIFEdipine (PROCARDIA XL) 60 MG extended release tablet Take 60 mg by mouth daily    Historical Provider, MD   bumetanide (BUMEX) 2 MG tablet Take 4 mg by mouth 2 times daily (before meals) Takes 2 tabs (=4mg) BID before meals    Historical Provider, MD   ondansetron (ZOFRAN-ODT) 4 MG disintegrating tablet Take 4 mg by mouth every 8 hours as needed for Nausea or Vomiting  Patient not taking: No sig reported    Historical Provider, MD   oxybutynin (DITROPAN-XL) 10 MG extended release tablet Take 10 mg by mouth every morning    Historical Provider, MD   cetirizine (ZYRTEC) 10 MG tablet Take 10 mg by mouth daily    Historical Provider, MD   escitalopram (LEXAPRO) 20 MG tablet Take 20 mg by mouth every morning    Historical Provider, MD   ferrous sulfate (FE TABS 325) 325 (65 Fe) MG EC tablet Take 325 mg by mouth every morning    Historical Provider, MD   magnesium oxide (MAG-OX) 400 MG tablet Take 400 mg by mouth 3 times daily    Historical Provider, MD   sennosides-docusate sodium (SENOKOT-S) 8.6-50 MG tablet Take 2 tablets by mouth daily as needed for Constipation    Historical Provider, MD   atorvastatin (LIPITOR) 10 MG tablet Take 10 mg by mouth nightly     Historical Provider, MD   aspirin 81 MG EC tablet Take 81 mg by mouth daily Instructions to be obtained from pcp. Pt has taken asa through 10/27/2022. Dr. Fernando Gray office notified (see note). Pt to f/u w/ them for instructions. Dr. Fernando Gray note requested to stop asa 7 days prior to sx    Historical Provider, MD   gabapentin (NEURONTIN) 100 MG capsule Take 100 mg by mouth 3 times daily.  Takes 2 caps (=200mg) TID    Historical Provider, MD   pantoprazole (PROTONIX) 40 MG tablet Take 40 mg by mouth 2 times daily (before meals)     Historical Provider, MD   insulin lispro (HUMALOG) 100 UNIT/ML injection vial Inject into the skin 4 times daily (with meals and nightly) Indications: sliding scale    Historical Provider, MD   insulin glargine (LANTUS) 100 UNIT/ML injection vial Inject 10 Units into the skin daily    Historical Provider, MD     Past Medical History    has a past medical history of Anemia, Anxiety, Arthritis, Depression, Diabetes mellitus ([address] Diabetic neuropathy ([address] Edema, Gastroparesis, GERD (gastroesophageal reflux disease), Heart murmur, Hemodialysis patient ([address] History of blood transfusion, Hyperlipidemia, Hypertension, OAB (overactive bladder), PONV (postoperative nausea and vomiting), and Wears prescription eyeglasses. Past Surgical History   has a past surgical history that includes Cholecystectomy (10/2022); IR THROMBECTOMY GRAFT NOT DIALYSIS (Left); and eye surgery (Left). Social History   reports that she has been smoking cigarettes. She has been smoking an average of 1 pack per day. She has never used smokeless tobacco.   reports that she does not currently use alcohol after a past usage of about 6.0 standard drinks per week. reports no history of drug use. Marital Status: life partner  Family History  Family Status   Relation Name Status    Mother      Father  Alive     family history includes Diabetes in her father and mother. Review of Systems:  CONSTITUTIONAL:   negative for fevers, chills, fatigue and malaise    EYES:   negative for double vision, see HPI    HEENT:   negative for tinnitus, epistaxis and sore throat     RESPIRATORY:   negative for cough, shortness of breath, wheezing     CARDIOVASCULAR:   negative for chest pain, palpitations, syncope, edema     GASTROINTESTINAL:   negative for nausea, vomiting     GENITOURINARY:   negative for incontinence     MUSCULOSKELETAL:   negative for neck or back pain     NEUROLOGICAL:   Negative for weakness and tingling  negative for headaches and dizziness     PSYCHIATRIC:   negative for anxiety       OBJECTIVE:   VITALS:  height is 5' 3\" (1.6 m) and weight is 138 lb (62.6 kg). See nursing flowsheet. CONSTITUTIONAL:alert & oriented x 3, no acute distress. Calm and pleasant.     SKIN: Warm and dry, no rashes to exposed areas of skin. HEAD:  Normocephalic, atraumatic. EYES: Bilateral eyes minimally reactive to light; EOMs intact. Wearing glasses. EARS:  Intact and equal bilaterally. No edema or thickening, without lumps, lesions, or discharge. Hearing grossly WNL. NOSE:  Nares patent. No rhinorrhea. MOUTH/THROAT:  Mucous membranes pink and moist, uvula midline, teeth appear to be intact. NECK: Supple, no lymphadenopathy. LUNGS: Respirations even and non-labored. Clear to auscultation bilaterally, no wheezes, rales, or rhonchi. CARDIOVASCULAR: Regular rate and rhythm, no murmurs/rubs/gallops. ABDOMEN: soft, non-tender and non-distended, bowel sounds active x 4. EXTREMITIES: 2+ edema to bilateral lower extremities. Venous insufficiency to RLE. NEUROLOGIC: CN II-XII are grossly intact. Gait not assessed. IMPRESSIONS:   VITREOUS HEMORRHAGE  PLANS:   VITRECTOMY 25 GAUGE, MEMBRANE PEEL, ENDOLASER - Right.     ENEIDA Forrest - CNP   Electronically signed 11/1/2022 at 10:32 AM

## 2022-11-01 NOTE — ANESTHESIA POSTPROCEDURE EVALUATION
Department of Anesthesiology  Postprocedure Note    Patient: Holland Archuleta  MRN: [id number]  YOB: 1970  Date of evaluation: 11/1/2022      Procedure Summary     Date: 11/01/22 Room / Location: [address]    Anesthesia Start: 1730 Anesthesia Stop: 3877    Procedure: VITRECTOMY 25 GAUGE, MEMBRANE PEEL, ENDOLASER 200MS 200MW 26 SPOTS, AIR FLUID EXCHANGE, SILICONE OIL (Right) Diagnosis:       Vitreous hemorrhage of right eye ([address]      (VITREOUS HEMORRHAGE)    Surgeons: Fam Ford MD Responsible Provider: Terri Medrano MD    Anesthesia Type: MAC ASA Status: 4          Anesthesia Type: No value filed.     James Phase I:      James Phase II:        Anesthesia Post Evaluation    Patient location during evaluation: PACU  Patient participation: complete - patient participated  Level of consciousness: awake  Pain score: 1  Airway patency: patent  Nausea & Vomiting: no nausea and no vomiting  Complications: no  Cardiovascular status: blood pressure returned to baseline and hemodynamically stable  Respiratory status: acceptable  Hydration status: euvolemic

## 2022-11-01 NOTE — ANESTHESIA PRE PROCEDURE
Department of Anesthesiology  Preprocedure Note       Name:  Kristyn Vicente   Age:  47 y.o.  :  1970                                          MRN:  [id number]         Date:  2022      Surgeon: Colin Gonzales):  Adam Conner MD    Procedure: Procedure(s):  VITRECTOMY 25 GAUGE, MEMBRANE PEEL, ENDOLASER    Medications prior to admission:   Prior to Admission medications    Medication Sig Start Date End Date Taking?  Authorizing Provider   apixaban (ELIQUIS) 2.5 MG TABS tablet Take by mouth 2 times daily 22  Yes Historical Provider, MD   losartan (COZAAR) 100 MG tablet Take by mouth daily 10/5/22  Yes Historical Provider, MD   pyridoxine (B-6) 50 MG tablet Take by mouth daily 22  Yes Historical Provider, MD   sucralfate (CARAFATE) 1 GM/10ML suspension Take by mouth 22  Yes Historical Provider, MD   acetaminophen (TYLENOL) 500 MG tablet Take 1,000 mg by mouth every 6 hours as needed 22  Yes Historical Provider, MD   metoclopramide (REGLAN) 5 MG tablet Take 1 tablet by mouth 4 times daily for 7 days  Patient taking differently: Take 5 mg by mouth 4 times daily Discontinued 22  Teresita Flores MD   vitamin D (ERGOCALCIFEROL) 1.25 MG (26076 UT) CAPS capsule Take 50,000 Units by mouth once a week    Historical Provider, MD   B Complex-C-Folic Acid (BRITNI-MARGARITO) TABS Take 1 tablet by mouth every morning    Historical Provider, MD   cloNIDine (CATAPRES) 0.1 MG tablet Take 0.1 mg by mouth every 6 hours as needed for High Blood Pressure (SBP>165)    Historical Provider, MD   hydrALAZINE (APRESOLINE) 100 MG tablet Take 100 mg by mouth daily    Historical Provider, MD   NIFEdipine (PROCARDIA XL) 60 MG extended release tablet Take 60 mg by mouth daily    Historical Provider, MD   bumetanide (BUMEX) 2 MG tablet Take 4 mg by mouth 2 times daily (before meals) Takes 2 tabs (=4mg) BID before meals    Historical Provider, MD   ondansetron (ZOFRAN-ODT) 4 MG disintegrating tablet Take 4 mg by mouth every 8 hours as needed for Nausea or Vomiting  Patient not taking: No sig reported    Historical Provider, MD   oxybutynin (DITROPAN-XL) 10 MG extended release tablet Take 10 mg by mouth every morning    Historical Provider, MD   cetirizine (ZYRTEC) 10 MG tablet Take 10 mg by mouth daily    Historical Provider, MD   escitalopram (LEXAPRO) 20 MG tablet Take 20 mg by mouth every morning    Historical Provider, MD   ferrous sulfate (FE TABS 325) 325 (65 Fe) MG EC tablet Take 325 mg by mouth every morning    Historical Provider, MD   magnesium oxide (MAG-OX) 400 MG tablet Take 400 mg by mouth 3 times daily    Historical Provider, MD   sennosides-docusate sodium (SENOKOT-S) 8.6-50 MG tablet Take 2 tablets by mouth daily as needed for Constipation    Historical Provider, MD   atorvastatin (LIPITOR) 10 MG tablet Take 10 mg by mouth nightly     Historical Provider, MD   aspirin 81 MG EC tablet Take 81 mg by mouth daily Instructions to be obtained from pcp. Pt has taken asa through 10/27/2022. Dr. Holland Weinstein office notified (see note). Pt to f/u w/ them for instructions. Dr. Holland Weinstein note requested to stop asa 7 days prior to sx    Historical Provider, MD   gabapentin (NEURONTIN) 100 MG capsule Take 100 mg by mouth 3 times daily.  Takes 2 caps (=200mg) TID    Historical Provider, MD   pantoprazole (PROTONIX) 40 MG tablet Take 40 mg by mouth 2 times daily (before meals)     Historical Provider, MD   insulin lispro (HUMALOG) 100 UNIT/ML injection vial Inject into the skin 4 times daily (with meals and nightly) Indications: sliding scale    Historical Provider, MD   insulin glargine (LANTUS) 100 UNIT/ML injection vial Inject 10 Units into the skin daily    Historical Provider, MD       Current medications:    Current Facility-Administered Medications   Medication Dose Route Frequency Provider Last Rate Last Admin    tobramycin-dexamethasone (TOBRADEX) ophthalmic suspension 1 drop  1 drop Both [facility] On Call to [address], MD        cyclopentolate (CYCLOGYL) 1 % ophthalmic solution 1 drop  1 drop Both Eyes Q5 Min PRN Paul Delgadillo MD        phenylephrine (JAVAN-SYNEPHRINE) 10 % ophthalmic solution 1 drop  1 drop Both Eyes Q5 Min PRN Paul Delgadillo MD        0.9 % sodium chloride infusion   IntraVENous Continuous Daniel Sharma MD           Allergies:     Allergies   Allergen Reactions    Iodinated Diagnostic Agents Other (See Comments)     Problems with kidneys       Adhesive Tape Rash     PATIENT WOULD PREFER PAPER TAPE BE USED       Problem List:    Patient Active Problem List   Diagnosis Code    Hypertensive crisis I16.9    Colitis K52.9    Epigastric pain R10.13    GERD (gastroesophageal reflux disease) K21.9    Dyslipidemia, goal LDL below 70 E78.5    Uncontrolled type 2 diabetes mellitus [id number]    Uncontrolled hypertension I10    ESRD (end stage renal disease) on dialysis ([facility] [address] N18.6, Z99.2    ESRD needing dialysis ([facility] [address] N18.6, Z99.2    Tobacco use Z72.0    Troponin level elevated R77.8    Gastroparesis K31.84       Past Medical History:        Diagnosis Date    Anemia     Anxiety     Arthritis     lower back    Depression     Diabetes mellitus (HCC)     Diabetic neuropathy (HCC)     Edema     bilat legs    Gastroparesis     GERD (gastroesophageal reflux disease)     Dr. Gold Pantoja Heart murmur     Cardiologist [address] [place] [address] Cardiology last seen 2022    Hemodialysis patient [facility])     Geena Vides Dr. History of blood transfusion     Hyperlipidemia     Hypertension     renal manages  Dr. Brian Salgado sees at dialysis    OAB (overactive bladder)     PONV (postoperative nausea and vomiting)     Wears prescription eyeglasses        Past Surgical History:        Procedure Laterality Date    CHOLECYSTECTOMY  10/2022    Dr. Joselito Abdalla Left     dialysis access       Social History: Social History     Tobacco Use    Smoking status: Every Day     Packs/day: 1.00     Types: Cigarettes    Smokeless tobacco: Never   Substance Use Topics    Alcohol use: Not Currently     Alcohol/week: 6.0 standard drinks     Types: 6 Cans of beer per week                                Ready to quit: Not Answered  Counseling given: Not Answered      Vital Signs (Current):   Vitals:    10/27/22 1357   Weight: 138 lb (62.6 kg)   Height: 5' 3\" (1.6 m)                                              BP Readings from Last 3 Encounters:   02/04/22 (!) 148/68   12/03/21 (!) 158/75   07/15/19 (!) 140/78       NPO Status: Time of last liquid consumption: 2300                        Time of last solid consumption: 2300                        Date of last liquid consumption: 10/31/22                        Date of last solid food consumption: 10/31/22    BMI:   Wt Readings from Last 3 Encounters:   10/27/22 138 lb (62.6 kg)   02/04/22 134 lb 7.7 oz (61 kg)   12/03/21 139 lb 1.8 oz (63.1 kg)     Body mass index is 24.45 kg/m².     CBC:   Lab Results   Component Value Date/Time    WBC 6.8 02/04/2022 11:12 AM    RBC 3.88 02/04/2022 11:12 AM    HGB 10.8 02/04/2022 11:12 AM    HCT 35.3 02/04/2022 11:12 AM    MCV 91.0 02/04/2022 11:12 AM    RDW 15.7 02/04/2022 11:12 AM     02/04/2022 11:12 AM       CMP:   Lab Results   Component Value Date/Time     02/04/2022 11:12 AM    K 4.3 02/04/2022 11:12 AM     02/04/2022 11:12 AM    CO2 22 02/04/2022 11:12 AM    BUN 16 02/04/2022 11:12 AM    CREATININE 3.56 02/04/2022 11:12 AM    GFRAA 16 02/04/2022 11:12 AM    LABGLOM 14 02/04/2022 11:12 AM    GLUCOSE 178 02/04/2022 11:12 AM    PROT 5.8 02/03/2022 03:22 AM    CALCIUM 8.0 02/04/2022 11:12 AM    BILITOT 0.24 02/03/2022 03:22 AM    ALKPHOS 89 02/03/2022 03:22 AM    AST 12 02/03/2022 03:22 AM    ALT 14 02/03/2022 03:22 AM       POC Tests: No results for input(s): POCGLU, POCNA, POCK, POCCL, POCBUN, POCHEMO, POCHCT in the last 72 hours. Coags:   Lab Results   Component Value Date/Time    PROTIME 12.5 02/01/2022 05:18 AM    INR 0.9 02/01/2022 05:18 AM       HCG (If Applicable): No results found for: PREGTESTUR, PREGSERUM, HCG, HCGQUANT     ABGs: No results found for: PHART, PO2ART, YGG8GQN, ILM6NYB, BEART, U0ZRIQHN     Type & Screen (If Applicable):  No results found for: LABABO, LABRH    Drug/Infectious Status (If Applicable):  No results found for: HIV, HEPCAB    COVID-19 Screening (If Applicable):   Lab Results   Component Value Date/Time    COVID19 Not Detected 01/31/2022 09:10 AM           Anesthesia Evaluation     history of anesthetic complications: PONV. Airway: Mallampati: II          Dental:          Pulmonary:normal exam  breath sounds clear to auscultation  (+) current smoker                           Cardiovascular:    (+) hypertension:,         Rhythm: regular  Rate: normal                    Neuro/Psych:   (+) psychiatric history:            GI/Hepatic/Renal:   (+) GERD:, renal disease: ESRD and dialysis,           Endo/Other:    (+) DiabetesType II DM, , .                 Abdominal:             Vascular: Other Findings:           Anesthesia Plan      MAC     ASA 4       Induction: intravenous. Anesthetic plan and risks discussed with patient. Plan discussed with CRNA. Narrative & Impression    ** Poor data quality, interpretation may be adversely affected  Sinus tachycardia  Moderate voltage criteria for LVH, may be normal variant  Borderline ECG  When compared with ECG of 02-DEC-2021 08:46,  T wave inversion no longer evident in Anterior leads      Specimen Collected: 01/31/22 07:43 EST            Left Ventricle: Systolic function is normal with an ejection fraction   of 60-65%. No segmental wall motion abnormalities.   Aortic Valve: The aortic valve is trileaflet. The leaflets are not   thickened and exhibit normal excursion.      Mitral Valve: Mitral valve structure is normal. The leaflets are mildly   thickened and exhibit normal excursion. There is mild regurgitation. There   is no evidence of mitral valve stenosis.   Right Ventricle: Right ventricular size is mildly dilated. The right   ventricular basal diameter is 43.0 mm.          Tatyana Naik MD   11/1/2022

## 2022-11-02 LAB
EKG ATRIAL RATE: 99 BPM
EKG P AXIS: 60 DEGREES
EKG P-R INTERVAL: 158 MS
EKG Q-T INTERVAL: 370 MS
EKG QRS DURATION: 72 MS
EKG QTC CALCULATION (BAZETT): 474 MS
EKG R AXIS: 71 DEGREES
EKG T AXIS: 38 DEGREES
EKG VENTRICULAR RATE: 99 BPM

## 2022-11-02 PROCEDURE — 93010 ELECTROCARDIOGRAM REPORT: CPT | Performed by: INTERNAL MEDICINE

## 2022-11-02 NOTE — OP NOTE
[address]                  [facility] [address]                                OPERATIVE REPORT    PATIENT NAME: Evelin Sweeney                      :        1970  MED REC NO:   [id number]                             ROOM:  ACCOUNT NO:   [de-identified]                           ADMIT DATE: 2022  PROVIDER:     Librado Lr    DATE OF PROCEDURE:  2022    PREOPERATIVE DIAGNOSES:  1. Dense vitreous hemorrhage, right eye. 2.  Proliferative diabetic retinopathy, both eyes. 3.  Traction retinal detachment, right eye. POSTOPERATIVE DIAGNOSES:  1. Dense vitreous hemorrhage, right eye. 2.  Proliferative diabetic retinopathy, both eyes. 3.  Traction retinal detachment, right eye. PROCEDURES:  Pars plana vitrectomy with extensive membrane peeling,  air-fluid exchange, panretinal laser and silicone, right eye. SURGEON:  Librado Lr MD    ANESTHESIA:  Monitored. EBL:  0    COMPLICATIONS:  0    REASON FOR OPERATION:  The patient is a 30-year-old woman with type 2  diabetes who has had severe vitreous hemorrhage. She has been reluctant  to have surgery in the past, but wishes to undergo surgery at this point  in time. She understands risks include bleeding, infection, increase in  retinal detachment or recurrent retinal detachment and the likely  development of cataract within the next year. PROCEDURE:  The patient was brought to the operating room in good  condition. She was administered local anesthetic. She was prepped and  draped in the usual fashion. 25-gauge vitrectomy trocars were placed  through the pars plana in the usual quadrants. The infusion attached to  the inferotemporal site. Light pipe and ocutome were placed through the  superior sites. There was dense vitreous hemorrhage present.   The  vitreous was partially detached, mostly superior and temporal.  The  vitreous was removed down to the posterior vitreous face. The posterior  vitreous face was removed at 12 and then around to 4 o'clock. I  attempted to peel vitreous from the retinal surface inferotemporally and  a large tear formed. I slowly peeled vitreous from peripheral retina  nasally and inferiorly so that there were no longer any visible  attachments between the vitreous base and the posterior pole. There  appeared to be dense large fibrovascular membranes covered by blood and  oozing blood. The vitreous over the macula was removed. The macula was  detached. The disc had overlying fibrosis that appeared to be atrophic. This was segmented. There were tears seen inferonasally and inferiorly. There was a large preretinal membrane, I think that seemed to be along  the superotemporal arcade, but the visualization was difficult. There  was no way to easily peel this. I elected to leave this in place and  did an air-fluid exchange which did seem to reattach to the retina. I  placed panretinal laser peripherally where the retina could be seen. There was still some oozing of blood and I expect it without obvious  source. I expected this would cease relatively quickly. I removed  residual fluid from over the optic nerve. I removed trocars and sutured  sites with 6-0 chromic. Intraocular pressure was in the normal range  tactilely at the end of the procedure. 50 mg of ceftazidime was  injected sub-Tenon's in the inferior temporal quadrant. The eye was  patched in the usual fashion. The patient was taken to recovery room in  good condition. Oscar Zelaya    D: 11/01/2022 15:16:53       T: 11/01/2022 15:21:00     [email or web address]  Job#: [id number]     Doc#: [id number]    CC:

## 2023-09-14 NOTE — DISCHARGE INSTR - ACTIVITY
Activity as tolerated Erythromycin Pregnancy And Lactation Text: This medication is Pregnancy Category B and is considered safe during pregnancy. It is also excreted in breast milk.

## 2025-05-29 NOTE — ED PROVIDER NOTES
[address] [place] [address] ED  EMERGENCY DEPARTMENT ENCOUNTER      Pt Name: Everardo Laboy  MRN: [id number]  [place] 1970  Date of evaluation: 1/31/2022  Provider: Arminda Beard MD    CHIEF COMPLAINT       Chief Complaint   Patient presents with    Abdominal Pain     CAME FROM DIALYSIS INCREASE ABD PAIN DID NOT HAVE DIALYSIS TODAY    Emesis     SINCE FRIDAY 1/28         HISTORY OF PRESENT ILLNESS  (Location/Symptom, Timing/Onset, Context/Setting, Quality, Duration, Modifying Factors, Severity.)   Everardo Laboy is a 49 y.o. female who presents to the emergency department for nausea vomiting and shortness of breath. This is a frequent occurrence for her and she was admitted earlier this month at another facility for the same problem. She went to dialysis today but they did not do that because she was ill. She has not had a fever and she is unvaccinated for Covid. She told me symptoms started yesterday and she told the nurse 3 days ago. She rated her body pains as a 10. She is not taking her medications because she has been nauseous      Nursing Notes were reviewed. ALLERGIES     Tape [place] tape]    CURRENT MEDICATIONS       Previous Medications    ASPIRIN 81 MG EC TABLET    Take 81 mg by mouth daily    ATORVASTATIN (LIPITOR) 10 MG TABLET    Take 10 mg by mouth nightly     B COMPLEX-C-FOLIC ACID (BRITNI-MARGARITO) TABS    Take 1 tablet by mouth every morning    BUMETANIDE (BUMEX) 2 MG TABLET    Take 4 mg by mouth 2 times daily (before meals) Takes 2 tabs (=4mg) BID before meals    CETIRIZINE (ZYRTEC) 10 MG TABLET    Take 10 mg by mouth daily    CLONIDINE (CATAPRES) 0.1 MG TABLET    Take 0.1 mg by mouth every 6 hours as needed for High Blood Pressure (SBP>165)    ESCITALOPRAM (LEXAPRO) 20 MG TABLET    Take 20 mg by mouth every morning    FERROUS SULFATE (FE TABS 325) 325 (65 FE) MG EC TABLET    Take 325 mg by mouth every morning    GABAPENTIN (NEURONTIN) 100 MG CAPSULE    Take 200 mg by mouth 3 times daily.  Takes 2 MEDICARE WELLNESS VISIT    Patient Care Team:  Sabra Ibrahim MD as PCP - General (Internal Medicine)  Myron Mays DO as General Surgeon (Surgery - Trauma Surgery)  Aman Núñez MD as Neurologist (Neurology)    Redd presents for her Subsequent Annual Medicare Wellness Visit with Medicare Wellness Visit (smwv), Follow-up, and Feeding problems   Acute and chronic problems were discussed separately.    Status MWV Topics Details (Refresh Note to Update)    Depression Screening (Trend)   PHQ2 Interpretation Negative          PHQ2: Score = 0      Depression screening is negative no further plan needed.    Blood Pressure  Weight  BMI     /60  Current Weight 141 lbs  body mass index is 21.76 kg/m².  BMI is within normal range.      Advanced Directives on File Yes on file.        Health Risk Assessment  (Click to Review or Edit)   Completed      Falls Risk  Have you had a fall in the past year?.................................Yes.  Do you feel unsteady when standing or walking?........ No  Do you worry about falling?.................................................. No       Tobacco/Alcohol/Drugs Never Smoker      reports current alcohol use of about 2.0 standard drinks of alcohol per week.   reports no history of drug use.      Opioid Review (Update Meds)    No opioid on med list.      Cognitive/Functional Status  (Optional MOCA  Mini Cog)   No evidence of cognitive dysfunction by direct observation.    Vision and Hearing Screens    Both screenings were performed and reviewed      Health Maintenance (Link)  See patient instructions and orders    {Document if any screenings, referrals or vaccines were discussed       The following items on the Medicare Health Risk Assessment were found to be positive  6 a.) How many servings of Fruits and Vegetables do you have each day ( 1 serving = 1 piece of fruit, 1/2 cup fruits or vegetables): 1 per day     6 b.) How many servings of High Fiber / Whole Grain Foods to  caps (=200mg) TID    HYDRALAZINE (APRESOLINE) 100 MG TABLET    Take 100 mg by mouth every 8 hours    INSULIN GLARGINE (LANTUS) 100 UNIT/ML INJECTION VIAL    Inject 20 Units into the skin daily     INSULIN LISPRO (HUMALOG) 100 UNIT/ML INJECTION VIAL    Inject into the skin 4 times daily (with meals and nightly) Indications: sliding scale    LABETALOL (NORMODYNE) 300 MG TABLET    Take 600 mg by mouth 3 times daily Takes 2 tabs (=600mg) TID    MAGNESIUM OXIDE (MAG-OX) 400 MG TABLET    Take 400 mg by mouth 3 times daily    NIFEDIPINE (PROCARDIA XL) 60 MG EXTENDED RELEASE TABLET    Take 60 mg by mouth 2 times daily    ONDANSETRON (ZOFRAN-ODT) 4 MG DISINTEGRATING TABLET    Take 4 mg by mouth every 8 hours as needed for Nausea or Vomiting    OXYBUTYNIN (DITROPAN-XL) 10 MG EXTENDED RELEASE TABLET    Take 10 mg by mouth every morning    PANTOPRAZOLE (PROTONIX) 40 MG TABLET    Take 40 mg by mouth 2 times daily (before meals)     SENNOSIDES-DOCUSATE SODIUM (SENOKOT-S) 8.6-50 MG TABLET    Take 2 tablets by mouth daily as needed for Constipation    VITAMIN D (ERGOCALCIFEROL) 1.25 MG (69996 UT) CAPS CAPSULE    Take 50,000 Units by mouth once a week       PAST MEDICAL HISTORY         Diagnosis Date    Diabetes mellitus ([facility]     [facility]     Hypertension        SURGICAL HISTORY     History reviewed. No pertinent surgical history. FAMILY HISTORY           Problem Relation Age of Onset    No Known Problems Mother     No Known Problems Father      Family Status   Relation Name Status    Mother  (Not Specified)    Father  (Not Specified)        SOCIAL HISTORY      reports that she has been smoking. She has been smoking about 1.00 pack per day. She has never used smokeless tobacco. She reports current alcohol use of about 6.0 standard drinks of alcohol per week. She reports that she does not use drugs.     REVIEW OF SYSTEMS    (2-9 systems for level 4, 10 or more for level 5)     Review of Systems you have each day ( 1 serving = 1 cup cold cereal, 1/2 cup cooked cereal, 1 slice bread): 1 per day     7a.) Have you had a fall in the past year?: Yes     11d.) Bodily pain: Often       I reviewed and updated the past Medical, Surgical, Family, Social History, Allergies and Medications in Epic: Yes    Family History   Problem Relation Age of Onset   • Diabetes Mother         Type 2   • Heart disease Mother         CHF   • Hypertension Mother    • Osteoarthritis Mother    • Osteoporosis Mother    • Congestive Heart Failure Mother    • Dementia/Alzheimers Mother    • Hypertension Father    • Osteoarthritis Father    • Cardiomyopathy Father    • Myocardial Infarction Father    • Heart disease Maternal Grandmother    • Glaucoma Maternal Grandfather    • Cancer Paternal Grandmother         ovarian   • Heart disease Paternal Grandfather    • Arthritis Mother    • Hearing Loss Mother         Hard of hearing   • High blood pressure Mother    • Stroke Mother         Small strokes with no lasting effects   • Vision Loss Mother         Corrective Cataract surgery - both eyes   • Arthritis Father    • Cancer Father         Intestinal mass removed, no further issues   • High blood pressure Father    • Vision Loss Father         Corrective Cataract surgery - one eye   • Vision Loss Maternal Grandfather         Glaucoma led to blindness         Return in about 1 year (around 5/29/2026).    Chief Complaint   Patient presents with   • Medicare Wellness Visit     wv   • Follow-up   • Feeding problems     HISTORY OF PRESENT ILLNESS:  Yamilex Salguero 72 year old female is here for following problems.    1.  Osteoporosis-continues calcium and vitamin D supplement, will discuss increased physical activity although she keeps herself active with line dancing and ballroom dancing    2.  Vitamin D deficiency-improved and continues to take daily supplements    3.  History of transverse myelitis-overall recovered and able to do her  Constitutional: Negative for chills, fatigue and fever. HENT: Negative for congestion, ear discharge and facial swelling. Eyes: Negative for discharge and redness. Respiratory: Positive for shortness of breath. Negative for cough. Cardiovascular: Negative for chest pain. Gastrointestinal: Positive for abdominal pain, nausea and vomiting. Negative for constipation and diarrhea. Genitourinary: Negative for dysuria and hematuria. Musculoskeletal: Negative for arthralgias. Skin: Negative for color change and rash. Neurological: Negative for syncope, numbness and headaches. Hematological: Negative for adenopathy. Psychiatric/Behavioral: Negative for confusion. The patient is not nervous/anxious. Except as noted above the remainder of the review of systems was reviewed and negative. PHYSICAL EXAM    (up to 7 for level 4, 8 or more for level 5)     Vitals:    01/31/22 0744   BP: (!) 253/122   Pulse: 107   Resp: 22   Temp: 97.5 °F (36.4 °C)   TempSrc: Oral   SpO2: 99%   Weight: 140 lb (63.5 kg)   Height: 5' 3\" (1.6 m)       Physical Exam  Vitals reviewed. Constitutional:       General: She is not in acute distress. Appearance: She is well-developed. She is not diaphoretic. HENT:      Head: Normocephalic and atraumatic. Eyes:      General: No scleral icterus. Right eye: No discharge. Left eye: No discharge. Cardiovascular:      Rate and Rhythm: Normal rate and regular rhythm. Pulmonary:      Effort: Pulmonary effort is normal. No respiratory distress. Breath sounds: Normal breath sounds. No stridor. No wheezing or rales. Abdominal:      General: There is no distension. Palpations: Abdomen is soft. Tenderness: There is no abdominal tenderness. Musculoskeletal:         General: Normal range of motion. Cervical back: Neck supple. Lymphadenopathy:      Cervical: No cervical adenopathy. Skin:     General: Skin is warm and dry. day-to-day activities    4.  Health maintenance-given order for mammogram and colonoscopy        Past Medical History:   Diagnosis Date   • Arthritis ?   • Neuromuscular disorder  (CMD) 02/26/2020    Transverse Myelitis triggered by generic Fosamax   • Osteoporosis 1/28/2016   • Osteoporosis 1/28/2016   • Seborrheic keratosis    • Umbilical hernia    • Vitiligo        Current Outpatient Medications   Medication Sig Dispense Refill   • TURMERIC PO Take 1 tablet by mouth daily.     • gabapentin (NEURONTIN) 400 MG capsule TAKE 1 CAPSULE BY MOUTH 4 TIMES A  capsule 0   • cholecalciferol 25 mcg(1,000 units) tablet Take 2 tablets by mouth daily. (Patient taking differently: Take 25 mcg by mouth daily.)     • Biotin 37446 MCG Tab      • acetaminophen (TYLENOL) 500 MG tablet Take 250 mg by mouth 3 times daily as needed for Pain.     • vitamin E 400 UNIT capsule Take 400 Units by mouth daily.      • magnesium oxide (MAG-OX) 400 MG tablet Take 200 mg by mouth every other day.     • Multiple Vitamins-Minerals (MULTIVITAMIN PO) Take 1 tablet by mouth daily. Do not start before March 11, 2020.     • CALCIUM CITRATE-VITAMIN D PO Take 1 tablet by mouth daily.      • Omega-3 Fatty Acids (FISH OIL) 1200 MG capsule Take 2 g by mouth daily.        No current facility-administered medications for this visit.       ALLERGIES:   Allergen Reactions   • Fosamax Other (See Comments)     Leg cramps, weakness       REVIEW OF SYSTEMS:  No fever, cough, chest pain  Rest ROS (review of systems) as per HPI (history of present illness)      PHYSICAL EXAMINATION:  VITAL SIGNS:    Visit Vitals  /60 (BP Location: LUE - Left upper extremity, Patient Position: Sitting, Cuff Size: Regular)   Pulse 76   Ht 5' 7.5\" (1.715 m)   Wt 64 kg (141 lb)   SpO2 98%   BMI 21.76 kg/m²     CONSTITUTIONAL: Patient appears healthy and appropriate for age, and is in no distress.  EYES:  Normal conjunctivae and lids.  PERRLA (pupils equal, round, reactive to  light and accomodation.   RESPIRATORY:  No retraction or accessory muscle use.  Normal breath  sounds; no rales, rhonchi or wheezing.  CARDIOVASCULAR:  Normal S1, S2; no murmurs.  No peripheral edema.  MUSCULOSKELETAL: No cyanosis, no clubbing.  No  inflammation.  PSYCHIATRIC:  Normal judgment and insight.  Alert and oriented x3.    ASSESSMENT:  Medicare annual wellness visit, subsequent  (primary encounter diagnosis)  See above    Vitamin D deficiency  Plan: Vitamin D -25 Hydroxy  Improved, continue supplements    Transverse myelitis  (CMD)  Recovered fairly well and able to do her day-to-day activities including dancing 2 times a week    Osteoporosis, unspecified osteoporosis type, unspecified pathological fracture presence  Plan: Vitamin D -25 Hydroxy  Concerned about taking any prescription medications, will continue calcium vitamin D and increased physical activity as discussed with resistance and weight    Encounter for screening mammogram for malignant neoplasm of breast  Plan: MAMMO SCREENING BILATERAL W [place]    Colon cancer screening  Plan: OPEN ACCESS COLONOSCOPY    Abnormal TSH  Plan: CBC with Automated Differential, Comprehensive         Metabolic Panel, Urinalysis With Microscopy &         Culture If Indicated, Thyroid Stimulating         Hormone Reflex  Recheck next visit    Brittle nails  Plan: Thyroid Stimulating Hormone Reflex        Return in about 1 year (around 5/29/2026).  With Medicare wellness          m)       Orders Placed This Encounter   Medications    cloNIDine (CATAPRES) tablet 0.1 mg    hydrALAZINE (APRESOLINE) tablet 100 mg    labetalol (NORMODYNE) tablet 600 mg    NIFEdipine (PROCARDIA XL) extended release tablet 60 mg    scopolamine (TRANSDERM-SCOP) transdermal patch 1 patch    LORazepam (ATIVAN) injection 2 mg    hydrALAZINE (APRESOLINE) injection 20 mg       Medical Decision Making: The patient has uncontrolled hypertension and was given IV hydralazine and she is being admitted. CRITICAL CARE TIME     Due to the high probability of sudden and clinically significant deterioration in the patient's condition she required highest level of my preparedness to intervene urgently. I provided critical care time including documentation time, medication orders and management, reevaluation, vital sign assessment, ordering and reviewing of of lab tests ordering and reviewing of x-ray studies, and admission orders. Aggregate critical care time is 40 minutes including only time during which I was engaged in work directly related to her care and did not include time spent treating other patients simultaneously. CONSULTS:  IP CONSULT TO NEPHROLOGY    PROCEDURES:  None    FINAL IMPRESSION      1. Uncontrolled hypertension          DISPOSITION/PLAN   DISPOSITION Decision To Admit 01/31/2022 10:28:49 AM      PATIENT REFERRED TO:   No follow-up provider specified. DISCHARGE MEDICATIONS:     New Prescriptions    No medications on file       The care is provided during an unprecedented national emergency due to the novel coronavirus, COVID-19.     (Please note that portions of this note were completed with a voice recognition program.  Efforts were made to edit the dictations but occasionally words are mis-transcribed.)    Arminda Beard MD  Attending Emergency Physician            Arminda Beard MD  01/31/22 [id number]

## (undated) DEVICE — NEEDLE HYPO 27GA L0.5IN GRY POLYPR HUB S STL REG BVL STR

## (undated) DEVICE — SURGICAL PROCEDURE PACK 25 GA VITRECTOMY

## (undated) DEVICE — REVOLUTION DSP 25G ILM FORCEPS: Brand: ALCON GRIESHABER REVOLUTION

## (undated) DEVICE — KIT OPHTH 7ML PERFLUOROCARBON LIQ PROC PERFLUORON

## (undated) DEVICE — STANDARD HYPODERMIC NEEDLE,ALUMINUM HUB: Brand: MONOJECT

## (undated) DEVICE — OCCLUDER EYE POLYETH HYPOALRG ADH TAPE W/O PINHOLE

## (undated) DEVICE — OCCUCOAT SYRINGE 1ML 6PK: Brand: OCCUCOAT

## (undated) DEVICE — 1 EACH 40411 STERILE DISPOSABLE SUPER VIEW® LENS SET & 1 EACH 40100 STERILE MICROSCOPE DRAPE: Brand: SUPER VIEW® PACK

## (undated) DEVICE — 25 GA FLEXIBLE TIP LASER PROBE: Brand: ALCON, ENGAUGE

## (undated) DEVICE — VISCOUS FLUID CONTROL PAK: Brand: CONSTELLATION

## (undated) DEVICE — CYCLOGEL 1% GTTS

## (undated) DEVICE — SYRINGE MED 5ML STD CLR PLAS LUERLOCK TIP N CTRL DISP

## (undated) DEVICE — 25GA EZPASS SOFT TIP CANNULA BOX OF 5: Brand: VORTEX SURGICAL INC

## (undated) DEVICE — SUTURE CHROMIC GUT 6-0 ETH1731G

## (undated) DEVICE — GOWN,SIRUS,NONRNF,SETINSLV,XL,20/CS: Brand: MEDLINE

## (undated) DEVICE — CAUTERY BPLR 25GA INTOCU W/ HNDPC CRD DISP FOR CX9404

## (undated) DEVICE — RETINA PK